# Patient Record
Sex: FEMALE | Race: BLACK OR AFRICAN AMERICAN | NOT HISPANIC OR LATINO | Employment: UNEMPLOYED | URBAN - METROPOLITAN AREA
[De-identification: names, ages, dates, MRNs, and addresses within clinical notes are randomized per-mention and may not be internally consistent; named-entity substitution may affect disease eponyms.]

---

## 2017-01-19 RX ORDER — LOSARTAN POTASSIUM 50 MG/1
TABLET ORAL
Qty: 30 TABLET | Refills: 9 | Status: SHIPPED | OUTPATIENT
Start: 2017-01-19 | End: 2017-01-19 | Stop reason: SDUPTHER

## 2017-01-19 RX ORDER — LOSARTAN POTASSIUM 50 MG/1
TABLET ORAL
Qty: 90 TABLET | Refills: 9 | Status: SHIPPED | OUTPATIENT
Start: 2017-01-19 | End: 2018-06-12 | Stop reason: SDUPTHER

## 2017-02-19 DIAGNOSIS — K21.9 GASTROESOPHAGEAL REFLUX DISEASE, ESOPHAGITIS PRESENCE NOT SPECIFIED: ICD-10-CM

## 2017-02-20 RX ORDER — PANTOPRAZOLE SODIUM 40 MG/1
TABLET, DELAYED RELEASE ORAL
Qty: 90 TABLET | Refills: 0 | OUTPATIENT
Start: 2017-02-20

## 2017-03-27 RX ORDER — PANTOPRAZOLE SODIUM 40 MG/1
40 TABLET, DELAYED RELEASE ORAL DAILY
Qty: 90 TABLET | Refills: 2 | Status: SHIPPED | OUTPATIENT
Start: 2017-03-27 | End: 2018-08-28 | Stop reason: SDUPTHER

## 2017-03-30 ENCOUNTER — PATIENT MESSAGE (OUTPATIENT)
Dept: FAMILY MEDICINE | Facility: CLINIC | Age: 42
End: 2017-03-30

## 2017-05-25 ENCOUNTER — TELEPHONE (OUTPATIENT)
Dept: FAMILY MEDICINE | Facility: CLINIC | Age: 42
End: 2017-05-25

## 2017-05-25 RX ORDER — ATORVASTATIN CALCIUM 40 MG/1
40 TABLET, FILM COATED ORAL DAILY
Qty: 90 TABLET | Refills: 1 | Status: SHIPPED | OUTPATIENT
Start: 2017-05-25 | End: 2018-06-12 | Stop reason: SDUPTHER

## 2017-05-25 NOTE — TELEPHONE ENCOUNTER
Fax rec'd from [facility].  Plan does not cover Brand Crestor 40 mg.  Alternatives include genic.  New Rx needed.

## 2017-06-22 ENCOUNTER — OFFICE VISIT (OUTPATIENT)
Dept: FAMILY MEDICINE | Facility: CLINIC | Age: 42
End: 2017-06-22
Payer: MEDICAID

## 2017-06-22 VITALS
HEART RATE: 84 BPM | HEIGHT: 67 IN | WEIGHT: 272.06 LBS | TEMPERATURE: 99 F | DIASTOLIC BLOOD PRESSURE: 84 MMHG | SYSTOLIC BLOOD PRESSURE: 127 MMHG | BODY MASS INDEX: 42.7 KG/M2

## 2017-06-22 DIAGNOSIS — I10 HTN (HYPERTENSION), BENIGN: Primary | ICD-10-CM

## 2017-06-22 PROCEDURE — 99499 UNLISTED E&M SERVICE: CPT | Mod: S$PBB,,, | Performed by: FAMILY MEDICINE

## 2017-06-22 PROCEDURE — 99213 OFFICE O/P EST LOW 20 MIN: CPT | Mod: PBBFAC,PO | Performed by: FAMILY MEDICINE

## 2017-06-22 PROCEDURE — 99999 PR PBB SHADOW E&M-EST. PATIENT-LVL III: CPT | Mod: PBBFAC,,, | Performed by: FAMILY MEDICINE

## 2017-06-22 NOTE — PROGRESS NOTES
Subjective:      Patient ID: Rama Moy is a 41 y.o. female.    Chief Complaint: Annual Exam    Office visit cancelled because she scheduled annual exam too early          No results found for: MICROALBUR                        Objective:   Physical Exam  Assessment:   No diagnosis found.  Plan:          There are no Patient Instructions on file for this visit.

## 2017-09-11 ENCOUNTER — TELEPHONE (OUTPATIENT)
Dept: FAMILY MEDICINE | Facility: CLINIC | Age: 42
End: 2017-09-11

## 2017-09-11 NOTE — TELEPHONE ENCOUNTER
----- Message from Jessica Solis sent at 9/11/2017 11:23 AM CDT -----  Contact: Patient [phone]  Requesting a Referral    Requesting to see: Grief Counselor    Reason for appt: Loss mother on Saturday    Please call and advise when referral has been placed.    Thank You

## 2017-09-11 NOTE — TELEPHONE ENCOUNTER
Can offer appt with me on Wed or Thursday if she'd like to discuss this & consider medications    I have a lot of therapist recommendations she can call also: These appt my take a few weeks & she can see me in the interim.     THERAPIST RECOMMENDATIONS:    Sherif Fuentes , [address] [facility], [facility] [phone] ( HOLISTIC, good with teenagers)  Jessica Trivedisonyacq - [phone],  [address], [facility]  Aparna Doyle, PhD - family & play therapist, [phone]  Quinn Billy, Full time at [employer], marriage & family counsellor  Dm Lopez - couples therapist  Collette Solorzano - [facility]  Caesar Krishnamurthy on Met Kobi Ling on Prytania  Meli Clouatre  [address]  [facility] - Jane Dunlap, Joan Villalta,  Inocencia Moore, Jayshree Wiley [phone]  [facility] - [phone]  Rachel Son (ayurvedic) - [phone] (focus on families, trauma)  Mariangel Pamela [address] [facility], [phone]  Luc Donnelly [phone] (focus on addiction)  [facility] - Brian Rober [phone](focus on addiction)  Ana Lilia Barrera [phone]  Mary Figueroa [phone]  Sherif Fuentes , [address] [facility], [facility] [phone] ( HOLISTIC, good with teenagers)  [facility], [phone]  Pretty Alexander (sexual relationship therapist), [address] [facility] [address], [phone]  Dannielle Weir  [employer], [phone], [address] [facility], [address], Dr Iliana Pool, Social workers, Art therapy,  bodywork therapy, Conscious breathing , Alternative healing, Massage therapists, [email or web address]

## 2017-09-12 NOTE — TELEPHONE ENCOUNTER
----- Message from Jessica Solis sent at 9/12/2017  8:55 AM CDT -----  Contact: Patient [phone]  Returned Call    Who left the message: Iliana Elizabeth LPN    When did the practice call: 09/11/2017 3:22pm    Please advise.    Thank You

## 2017-09-19 ENCOUNTER — OFFICE VISIT (OUTPATIENT)
Dept: FAMILY MEDICINE | Facility: CLINIC | Age: 42
End: 2017-09-19
Payer: MEDICAID

## 2017-09-19 VITALS
HEART RATE: 96 BPM | DIASTOLIC BLOOD PRESSURE: 82 MMHG | TEMPERATURE: 98 F | SYSTOLIC BLOOD PRESSURE: 120 MMHG | HEIGHT: 67 IN | BODY MASS INDEX: 41.94 KG/M2 | WEIGHT: 267.19 LBS

## 2017-09-19 DIAGNOSIS — F43.21 GRIEF: Primary | ICD-10-CM

## 2017-09-19 DIAGNOSIS — I10 HTN (HYPERTENSION), BENIGN: ICD-10-CM

## 2017-09-19 PROCEDURE — 99999 PR PBB SHADOW E&M-EST. PATIENT-LVL III: CPT | Mod: PBBFAC,,, | Performed by: FAMILY MEDICINE

## 2017-09-19 PROCEDURE — 3008F BODY MASS INDEX DOCD: CPT | Mod: ,,, | Performed by: FAMILY MEDICINE

## 2017-09-19 PROCEDURE — 3079F DIAST BP 80-89 MM HG: CPT | Mod: ,,, | Performed by: FAMILY MEDICINE

## 2017-09-19 PROCEDURE — 3074F SYST BP LT 130 MM HG: CPT | Mod: ,,, | Performed by: FAMILY MEDICINE

## 2017-09-19 PROCEDURE — 99213 OFFICE O/P EST LOW 20 MIN: CPT | Mod: PBBFAC,PO | Performed by: FAMILY MEDICINE

## 2017-09-19 PROCEDURE — 99212 OFFICE O/P EST SF 10 MIN: CPT | Mod: S$PBB,,, | Performed by: FAMILY MEDICINE

## 2017-09-19 RX ORDER — TRAZODONE HYDROCHLORIDE 50 MG/1
50 TABLET ORAL NIGHTLY
Qty: 30 TABLET | Refills: 11 | Status: SHIPPED | OUTPATIENT
Start: 2017-09-19 | End: 2021-07-06

## 2017-09-19 NOTE — PATIENT INSTRUCTIONS
PSYCHIATRIST RECOMMENDATIONS:    APPS:  [place]        THERAPIST RECOMMENDATIONS:    Sherif Fuentes , [address] [facility], [facility] [phone] ( HOLISTIC, good with teenagers)  Jessica Mcculloughashwin - [phone],  [address] [facility]  Aparna Doyle, PhD - family & play therapist, [phone]  Quinn Billy, Full time at [employer], marriage & family counsellor  Dm Lopez - couples therapist  Collette Solorzano - [facility]  Caesar Krishnamurthy on Met Kobi Ling on [place]  Meli Clouatre  [address]  [facility] - Jane Dunlap, Joan Villalta,  Inocencia Moore, Jayshree Wiley [phone]  Shima Gomez - [phone]  Rachel Son (ayurvedic) - [phone] (focus on families, trauma)  Mariangel Santiago [address] [facility], [phone]  Luc Donnelly [phone] (focus on addiction)  [facility] - Brian Rober [phone](focus on addiction)  Ana Lilia Barrera [phone]  Mary Figueroa [phone]  Sherif Fuentes , [address] [facility], [facility] [phone] ( HOLISTIC, good with teenagers)  [facility], [phone]  Pretty Alexander (sexual relationship therapist), [address] [facility] [address], [phone]  Dannielle Weir  [employer], [phone], [address] [facility], [address], Dr Iliana Pool, Social workers, Art therapy,  bodywork therapy, Conscious breathing , Alternative healing, Massage therapists, [email or web address]

## 2017-10-13 ENCOUNTER — HOSPITAL ENCOUNTER (EMERGENCY)
Facility: OTHER | Age: 42
Discharge: HOME | End: 2017-10-13
Attending: EMERGENCY MEDICINE
Payer: MEDICAID

## 2017-10-13 VITALS
WEIGHT: 267.19 LBS | HEART RATE: 88 BPM | OXYGEN SATURATION: 99 % | RESPIRATION RATE: 16 BRPM | TEMPERATURE: 99 F | DIASTOLIC BLOOD PRESSURE: 82 MMHG | SYSTOLIC BLOOD PRESSURE: 156 MMHG | HEIGHT: 67 IN | BODY MASS INDEX: 41.94 KG/M2

## 2017-10-13 DIAGNOSIS — R07.9 CHEST PAIN: ICD-10-CM

## 2017-10-13 DIAGNOSIS — R07.9 CHEST PAIN, UNSPECIFIED TYPE: Primary | ICD-10-CM

## 2017-10-13 DIAGNOSIS — F41.1 ANXIETY REACTION: ICD-10-CM

## 2017-10-13 LAB
B-HCG UR QL: NEGATIVE
CTP QC/QA: YES

## 2017-10-13 PROCEDURE — 99284 EMERGENCY DEPT VISIT MOD MDM: CPT | Mod: 25

## 2017-10-13 PROCEDURE — 81025 URINE PREGNANCY TEST: CPT | Performed by: EMERGENCY MEDICINE

## 2017-10-13 PROCEDURE — 93005 ELECTROCARDIOGRAM TRACING: CPT

## 2017-10-13 PROCEDURE — 25000003 PHARM REV CODE 250: Performed by: PHYSICIAN ASSISTANT

## 2017-10-13 PROCEDURE — 93010 ELECTROCARDIOGRAM REPORT: CPT | Mod: ,,, | Performed by: INTERNAL MEDICINE

## 2017-10-13 RX ORDER — IBUPROFEN 600 MG/1
600 TABLET ORAL EVERY 6 HOURS PRN
Qty: 20 TABLET | Refills: 0 | Status: SHIPPED | OUTPATIENT
Start: 2017-10-13 | End: 2018-03-27 | Stop reason: ALTCHOICE

## 2017-10-13 RX ORDER — HYDROXYZINE HYDROCHLORIDE 25 MG/1
25 TABLET, FILM COATED ORAL EVERY 6 HOURS
Qty: 12 TABLET | Refills: 0 | OUTPATIENT
Start: 2017-10-13 | End: 2020-01-04

## 2017-10-13 RX ORDER — IBUPROFEN 600 MG/1
600 TABLET ORAL
Status: COMPLETED | OUTPATIENT
Start: 2017-10-13 | End: 2017-10-13

## 2017-10-13 RX ORDER — HYDROXYZINE PAMOATE 25 MG/1
25 CAPSULE ORAL
Status: COMPLETED | OUTPATIENT
Start: 2017-10-13 | End: 2017-10-13

## 2017-10-13 RX ADMIN — HYDROXYZINE PAMOATE 25 MG: 25 CAPSULE ORAL at 08:10

## 2017-10-13 RX ADMIN — IBUPROFEN 600 MG: 600 TABLET, FILM COATED ORAL at 08:10

## 2017-10-14 NOTE — ED TRIAGE NOTES
Pt presents to ED with c/o sharp, midsternal CP, reports CP daily since her mother dies in September. Pt tearful, reports she has been going to grief counseling. Pt denies radiation of chest pain, denies n/v, reports pt worse with inspiration. Pt AAO x4.

## 2017-10-14 NOTE — ED PROVIDER NOTES
Encounter Date: 10/13/2017       History     Chief Complaint   Patient presents with    Chest Pain     x 2 weeks, mom  last month, greiving process, L chest, reproducable, increased upon deep inspiration, sharp in nature, non- radiating, no N/V/D     Patient is 41 year old female history of HTN and thyroid disease who presents with complaints of chest pain that has been present on and off for a few weeks PTA. She reports pain is constant and waxes and wanes in intensity.  She reports it's located in the midsternal region and is worse with palpation.  It's described as a sharp sticking sensation and there is no associated shortness of breath nausea vomiting or radiation of pain.  She has not taken any medications help with her symptoms.  She admits that these symptoms have been present since her mother's death one month ago and that she is currently increased counseling every Thursday and has been in close contact with her primary care provider throughout this process.  She admits that persistent of this chest pain became worrisome for her today which is why she presents to the ER for evaluation.  She is currently unaccompanied in the ER.          Review of patient's allergies indicates:  No Known Allergies  Past Medical History:   Diagnosis Date    Constipation     GI Dr Reece     Grief 2017    HTN (hypertension), benign 3/10/2015    Hyperlipidemia     Hypertension     Knee pain, left     Dr Reyes, injury at work    Thyroid disease      Past Surgical History:   Procedure Laterality Date    DILATION AND CURETTAGE OF UTERUS      KNEE ARTHROSCOPY      Eric MOHAMUD    KNEE SURGERY      SKIN GRAFT      left finger due to injury    TUBAL LIGATION       Family History   Problem Relation Age of Onset    Hypertension Mother     Diabetes Mother     COPD Mother     Breast cancer Neg Hx     Colon cancer Neg Hx     Ovarian cancer Neg Hx      Social History   Substance Use Topics    Smoking status:  Never Smoker    Smokeless tobacco: Never Used    Alcohol use No     Review of Systems   Constitutional: Negative for fever.   HENT: Negative for sore throat.    Respiratory: Negative for shortness of breath.    Cardiovascular: Positive for chest pain.   Gastrointestinal: Negative for nausea.   Genitourinary: Negative for dysuria.   Musculoskeletal: Negative for back pain.   Skin: Negative for rash.   Neurological: Negative for weakness.   Hematological: Does not bruise/bleed easily.       Physical Exam     Initial Vitals [10/13/17 1950]   BP Pulse Resp Temp SpO2   (!) 183/90 97 20 98.6 °F (37 °C) 98 %      MAP       121         Physical Exam    Nursing note and vitals reviewed.  Constitutional: She appears well-developed and well-nourished. She is not diaphoretic. No distress.   Healthy appearing female in no acute distress or apparent pain.  She is tearful during interview and exam.  She makes good eye contact, speaks in clear full sentences and ambulates with ease.   HENT:   Head: Normocephalic and atraumatic.   Eyes: Conjunctivae are normal. Pupils are equal, round, and reactive to light. Right eye exhibits no discharge. Left eye exhibits no discharge. No scleral icterus.   Neck: Normal range of motion.   Cardiovascular: Normal rate, regular rhythm, normal heart sounds and intact distal pulses. Exam reveals no gallop and no friction rub.    No murmur heard.  Normal cardiac auscultation   Pulmonary/Chest: Breath sounds normal. She has no wheezes. She has no rhonchi. She has no rales.   midsternal chest tenderness to palpation with no overlying skin changes.  Clear lungs to auscultation bilaterally.   Abdominal: Soft. Bowel sounds are normal. There is no tenderness. There is no rebound and no guarding.   Musculoskeletal: Normal range of motion. She exhibits no edema or tenderness.   Lymphadenopathy:     She has no cervical adenopathy.   Neurological: She is alert and oriented to person, place, and time. She has  normal strength. No cranial nerve deficit or sensory deficit.   Skin: Skin is warm. Capillary refill takes less than 2 seconds. No rash noted. No erythema.   Psychiatric: She has a normal mood and affect. Her behavior is normal. Thought content normal.         ED Course   Procedures  Labs Reviewed   POCT URINE PREGNANCY - Normal     EKG Readings: (Independently Interpreted)   Initial Reading: No STEMI. Previous EKG: Compared with most recent EKG Rhythm: Normal Sinus Rhythm. Heart Rate: 98. Ectopy: No Ectopy. ST Segments: Normal ST Segments. T Waves: Normal. Axis: Normal.     Imaging Results          X-Ray Chest PA And Lateral (Final result)  Result time 10/13/17 20:55:23    Final result by Yohan Estrella MD (10/13/17 20:55:23)                 Impression:        No radiographic acute intrathoracic process seen.      Electronically signed by: YOHAN ESTRELLA MD, MD  Date:     10/13/17  Time:    20:55              Narrative:    COMPARISON: Chest radiograph 8/18/15    FINDINGS: Two views of the chest. No detrimental change.   Pulmonary vasculature and hilar regions are within normal limits.  The bilateral lungs are well expanded and clear.  No pleural effusion or pneumothorax.  The heart and mediastinal contours are within normal limits for age.  Included osseous structures appear intact.                                 Medical Decision Making:   ED Management:  Urgent evaluation a 41-year-old female who presents with complaints of midsternal chest pain related to anxiety reaction from recent death of her mother.  She is afebrile, nontoxic appearing, hemodynamically stable.  Physical exam outlined above and reveals reproducible midsternal chest pain with no other abnormalities.  EKG reveals NSR without concern for acute ischemia.  Chest x-ray unremarkable.  Pt PERC negative. Patient given ibuprofen and Atarax here in the emergency department for symptom improvement.  I have considered but do not suspect pulmonary embolus,  acute coronary syndrome, pulmonary infectious process. She is reassured that she is felt safe for discharge with strict instruction to follow-up with primary provider one to 2 days for symptom recheck.  Other:   I have discussed this case with another health care provider.       <> Summary of the Discussion: Stas                    ED Course      Clinical Impression:   The primary encounter diagnosis was Chest pain, unspecified type. Diagnoses of Chest pain and Anxiety reaction were also pertinent to this visit.                           Sharmin Pool PA-C  10/13/17 4601

## 2017-11-17 RX ORDER — UREA 40 %
CREAM (GRAM) TOPICAL
Qty: 85 G | Refills: 0 | Status: SHIPPED | OUTPATIENT
Start: 2017-11-17 | End: 2019-01-30

## 2018-03-27 ENCOUNTER — HOSPITAL ENCOUNTER (EMERGENCY)
Facility: OTHER | Age: 43
Discharge: HOME | End: 2018-03-28
Attending: EMERGENCY MEDICINE
Payer: MEDICAID

## 2018-03-27 VITALS
WEIGHT: 220 LBS | BODY MASS INDEX: 34.53 KG/M2 | OXYGEN SATURATION: 98 % | TEMPERATURE: 98 F | DIASTOLIC BLOOD PRESSURE: 95 MMHG | HEART RATE: 99 BPM | SYSTOLIC BLOOD PRESSURE: 160 MMHG | HEIGHT: 67 IN | RESPIRATION RATE: 18 BRPM

## 2018-03-27 DIAGNOSIS — M25.531 RIGHT WRIST PAIN: Primary | ICD-10-CM

## 2018-03-27 DIAGNOSIS — R52 PAIN: ICD-10-CM

## 2018-03-27 LAB
B-HCG UR QL: NEGATIVE
CTP QC/QA: YES

## 2018-03-27 PROCEDURE — 99284 EMERGENCY DEPT VISIT MOD MDM: CPT | Mod: 25

## 2018-03-27 PROCEDURE — 96372 THER/PROPH/DIAG INJ SC/IM: CPT

## 2018-03-27 PROCEDURE — 81025 URINE PREGNANCY TEST: CPT | Performed by: EMERGENCY MEDICINE

## 2018-03-27 PROCEDURE — 63600175 PHARM REV CODE 636 W HCPCS: Performed by: PHYSICIAN ASSISTANT

## 2018-03-27 RX ORDER — OXAPROZIN 600 MG/1
600 TABLET, FILM COATED ORAL 2 TIMES DAILY PRN
Qty: 20 TABLET | Refills: 0 | Status: SHIPPED | OUTPATIENT
Start: 2018-03-27 | End: 2018-08-28

## 2018-03-27 RX ORDER — KETOROLAC TROMETHAMINE 30 MG/ML
30 INJECTION, SOLUTION INTRAMUSCULAR; INTRAVENOUS
Status: COMPLETED | OUTPATIENT
Start: 2018-03-27 | End: 2018-03-27

## 2018-03-27 RX ADMIN — KETOROLAC TROMETHAMINE 30 MG: 30 INJECTION, SOLUTION INTRAMUSCULAR; INTRAVENOUS at 10:03

## 2018-03-28 NOTE — ED PROVIDER NOTES
Encounter Date: 3/27/2018       History     Chief Complaint   Patient presents with    Wrist Pain     Pt CO non-traumatic right wrist pain since this morning.      42-year-old female with history of hypertension, hyperlipidemia, thyroid disease and left knee pain status post knee surgery presents emergency department with complaints of right wrist pain.  States it started this morning when she woke up.  She denies trauma, injury, fever, chills, redness, warmth, numbness or weakness.  She complains of pain is an 8 out of 10.  No current treatment.  She is right-hand dominant      The history is provided by the patient.     Review of patient's allergies indicates:  No Known Allergies  Past Medical History:   Diagnosis Date    Constipation     GI Dr Reece 2014    Grief 9/19/2017    HTN (hypertension), benign 3/10/2015    Hyperlipidemia     Hypertension     Knee pain, left 2012    Dr Reyes, injury at work    Thyroid disease      Past Surgical History:   Procedure Laterality Date    DILATION AND CURETTAGE OF UTERUS      KNEE ARTHROSCOPY      LEric    KNEE SURGERY      SKIN GRAFT      left finger due to injury    TUBAL LIGATION       Family History   Problem Relation Age of Onset    Hypertension Mother     Diabetes Mother     COPD Mother     Breast cancer Neg Hx     Colon cancer Neg Hx     Ovarian cancer Neg Hx      Social History   Substance Use Topics    Smoking status: Never Smoker    Smokeless tobacco: Never Used    Alcohol use No     Review of Systems   Constitutional: Negative for chills and fever.   HENT: Negative for sore throat.    Respiratory: Negative for shortness of breath.    Cardiovascular: Negative for chest pain.   Gastrointestinal: Negative for nausea and vomiting.   Genitourinary: Negative for dysuria.   Musculoskeletal: Positive for arthralgias and myalgias. Negative for back pain, gait problem, joint swelling, neck pain and neck stiffness.   Skin: Negative for rash.    Neurological: Negative for weakness and numbness.   Hematological: Does not bruise/bleed easily.       Physical Exam     Initial Vitals [03/27/18 2004]   BP Pulse Resp Temp SpO2   (!) 160/95 99 18 98.3 °F (36.8 °C) 98 %      MAP       116.67         Physical Exam    Nursing note and vitals reviewed.  Constitutional: She appears well-developed and well-nourished. She is not diaphoretic. She is Obese .  Non-toxic appearance. No distress.   HENT:   Head: Normocephalic and atraumatic.   Right Ear: External ear normal.   Left Ear: External ear normal.   Nose: Nose normal.   Eyes: Conjunctivae and lids are normal. No scleral icterus.   Neck: Phonation normal.   Cardiovascular: Normal rate, regular rhythm, normal heart sounds, intact distal pulses and normal pulses. Exam reveals no gallop, no friction rub and no decreased pulses.    No murmur heard.  Pulses:       Radial pulses are 2+ on the right side, and 2+ on the left side.   Abdominal: Normal appearance. There is no rebound.   Musculoskeletal: Normal range of motion.        Right wrist: She exhibits tenderness. She exhibits no swelling, no effusion, no crepitus, no deformity and no laceration.   No obvious deformities, moving all extremities, normal gait  Right wrist-diffuse nonspecific tenderness palpation to the entire wrist.  No edema, ecchymosis or erythema.  No warmth.  No lymphangitis.  Pain with range of motion.  Strength 5 out of 5.  Intact distal pulses and no sensory deficits.  Capillary refill less than 3 seconds   Neurological: She is alert and oriented to person, place, and time. She has normal strength. No sensory deficit.   Skin: Skin is warm, dry and intact. Capillary refill takes less than 2 seconds. No abrasion, no bruising, no ecchymosis, no laceration, no lesion, no rash and no abscess noted. No erythema.   Psychiatric: She has a normal mood and affect. Her speech is normal and behavior is normal. Judgment normal. Cognition and memory are normal.          ED Course   Procedures  Labs Reviewed   POCT URINE PREGNANCY        Imaging Results          X-Ray Wrist Complete Right (Final result)  Result time 03/27/18 22:11:41    Final result by Bjorn Aragon MD (03/27/18 22:11:41)                 Impression:      No acute fracture.    Mild DJD.      Electronically signed by: Bjorn Aragon MD  Date:    03/27/2018  Time:    22:11             Narrative:    EXAMINATION:  XR WRIST COMPLETE 3 VIEWS RIGHT    CLINICAL HISTORY:  Pain, unspecified    TECHNIQUE:  PA, lateral, and oblique views of the right wrist were performed.    COMPARISON:  None    FINDINGS:  No fracture or dislocation.  Mild degenerative change at the greater multangular joint.  Soft tissues are unremarkable.                                X-Rays:   Independently Interpreted Readings:   Other Readings:  Right wrist-no acute fracture dislocation    Medical Decision Making:   History:   Old Medical Records: I decided to obtain old medical records.  Initial Assessment:   42-year-old female with complaints consistent with right wrist pain.  Afebrile neurovascular intact.  She is alert and healthy and nontoxic appearing.  Exam documented above.  No bony deformity, erythema or warmth.  No evidence of septic joint area no cellulitis or lymphangitis.  She denies trauma or injury.  Independently Interpreted Test(s):   I have ordered and independently interpreted X-rays - see prior notes.  Clinical Tests:   Radiological Study: Ordered and Reviewed  ED Management:  X-ray obtained and independently interpreted by myself with no evidence of fracture dislocation.  She was placed in a wrist splint and administered Toradol.  She is stable will be discharged home with oxaprozin and care instructions.  She is to follow-up with her doctor or orthopedics in the next 48 hours or return for any worsening signs or symptoms.  She states understanding.  This patient was discussed with the attending physician who agrees with  treatment plan.  Other:   I have discussed this case with another health care provider.       <> Summary of the Discussion: Jerilyn  This note was created using Dragon Medical dictation.  There may be typographical errors secondary to dictation.                        Clinical Impression:     1. Right wrist pain    2. Pain          Disposition:   Disposition: Discharged  Condition: Stable                        Ciara Kevin PA-C  03/27/18 8193

## 2018-03-28 NOTE — ED NOTES
Patient presents to the ED with a complaint of right wrist pain. Patient states that she can't turn or rotate wrist area. Patient able to slightly wiggle fingers, cap refill noted, radial pulse noted. Patient denies any trauma or pain to the area.

## 2018-03-29 ENCOUNTER — TELEPHONE (OUTPATIENT)
Dept: FAMILY MEDICINE | Facility: CLINIC | Age: 43
End: 2018-03-29

## 2018-03-29 ENCOUNTER — PES CALL (OUTPATIENT)
Dept: ADMINISTRATIVE | Facility: CLINIC | Age: 43
End: 2018-03-29

## 2018-03-29 DIAGNOSIS — M25.531 WRIST PAIN, RIGHT: Primary | ICD-10-CM

## 2018-03-29 NOTE — TELEPHONE ENCOUNTER
----- Message from Mary Posey sent at 3/29/2018 12:02 PM CDT -----  Contact: self/[phone]  Pt would like to get an referral from the doctor for a Orthopedic that Medicaid will cover. I explained to the pt she can call Medicaid to ask for a copy of Specialist they'll cover. DF

## 2018-03-29 NOTE — TELEPHONE ENCOUNTER
Ortho referral, demographics and 3/27/18 ER notes faxed to [facility].  Fax confirm rec'd.

## 2018-03-29 NOTE — TELEPHONE ENCOUNTER
Pt was seen at ER 3/27/18.for right wrist pain.  Pt advised to follow up with orthopedist.  Referral pended:     [facility]  [address]  [place]  [address]  [phone]

## 2018-04-02 ENCOUNTER — TELEPHONE (OUTPATIENT)
Dept: FAMILY MEDICINE | Facility: CLINIC | Age: 43
End: 2018-04-02

## 2018-04-02 NOTE — TELEPHONE ENCOUNTER
----- Message from Imelda Baca sent at 4/2/2018 11:37 AM CDT -----  Pt needs an external referral to [facility] -Pt has medicaid and [facility] cannot see her until August.

## 2018-04-03 NOTE — TELEPHONE ENCOUNTER
Nothing else needed.  Referral, demographics and 3/27/18 clinic notes were faxed to [facility] on 3/29.  Fax confirm was rec'd.

## 2018-06-12 DIAGNOSIS — Z00.00 ROUTINE GENERAL MEDICAL EXAMINATION AT A HEALTH CARE FACILITY: Primary | ICD-10-CM

## 2018-06-12 NOTE — TELEPHONE ENCOUNTER
----- Message from Jessica Solis sent at 6/12/2018  1:14 PM CDT -----  Contact: Patient [phone]  Requesting an earlier appt date or time    Next available appt: None    Nature of the appt: Annual Physical    Does patient have appt scheduled?: No    Please contact patient to schedule earlier appt if available but notify patient either way.    Thank You

## 2018-06-13 RX ORDER — LOSARTAN POTASSIUM 50 MG/1
50 TABLET ORAL DAILY
Qty: 90 TABLET | Refills: 0 | Status: SHIPPED | OUTPATIENT
Start: 2018-06-13 | End: 2018-09-26 | Stop reason: SDUPTHER

## 2018-06-13 RX ORDER — ATORVASTATIN CALCIUM 40 MG/1
40 TABLET, FILM COATED ORAL DAILY
Qty: 90 TABLET | Refills: 0 | Status: SHIPPED | OUTPATIENT
Start: 2018-06-13 | End: 2018-11-18 | Stop reason: SDUPTHER

## 2018-07-20 ENCOUNTER — TELEPHONE (OUTPATIENT)
Dept: FAMILY MEDICINE | Facility: CLINIC | Age: 43
End: 2018-07-20

## 2018-07-20 DIAGNOSIS — Z12.39 SCREENING BREAST EXAMINATION: Primary | ICD-10-CM

## 2018-07-20 NOTE — TELEPHONE ENCOUNTER
----- Message from Shira Pool sent at 7/19/2018  6:20 PM CDT -----  Contact: PT Portal Request  Appointment Request From: Rama Moy    With Provider: Other - (see comments)    Would Accept With:Only the person I've selected    Preferred Date Range: Any date 7/18/2018 or later    Preferred Times: Any    Reason for visit: Request an Appt    Comments:   annual mammogram exam, need to schedule an appointment    PT is needing a mammogram order placed please

## 2018-07-23 ENCOUNTER — HOSPITAL ENCOUNTER (OUTPATIENT)
Dept: RADIOLOGY | Facility: OTHER | Age: 43
Discharge: HOME | End: 2018-07-23
Attending: FAMILY MEDICINE
Payer: MEDICAID

## 2018-07-23 DIAGNOSIS — Z12.39 SCREENING BREAST EXAMINATION: ICD-10-CM

## 2018-07-23 PROCEDURE — 77067 SCR MAMMO BI INCL CAD: CPT | Mod: 26,,, | Performed by: RADIOLOGY

## 2018-07-23 PROCEDURE — 77067 SCR MAMMO BI INCL CAD: CPT | Mod: TC

## 2018-07-23 PROCEDURE — 77063 BREAST TOMOSYNTHESIS BI: CPT | Mod: 26,,, | Performed by: RADIOLOGY

## 2018-08-20 ENCOUNTER — LAB VISIT (OUTPATIENT)
Dept: LAB | Facility: HOSPITAL | Age: 43
End: 2018-08-20
Attending: FAMILY MEDICINE
Payer: MEDICAID

## 2018-08-20 DIAGNOSIS — Z00.00 ROUTINE GENERAL MEDICAL EXAMINATION AT A HEALTH CARE FACILITY: ICD-10-CM

## 2018-08-20 LAB
ALBUMIN SERPL BCP-MCNC: 3.2 G/DL
ALP SERPL-CCNC: 79 U/L
ALT SERPL W/O P-5'-P-CCNC: 10 U/L
ANION GAP SERPL CALC-SCNC: 11 MMOL/L
AST SERPL-CCNC: 14 U/L
BASOPHILS # BLD AUTO: 0.05 K/UL
BASOPHILS NFR BLD: 0.7 %
BILIRUB SERPL-MCNC: 0.4 MG/DL
BUN SERPL-MCNC: 15 MG/DL
CALCIUM SERPL-MCNC: 8.9 MG/DL
CHLORIDE SERPL-SCNC: 106 MMOL/L
CHOLEST SERPL-MCNC: 294 MG/DL
CHOLEST/HDLC SERPL: 7.4 {RATIO}
CO2 SERPL-SCNC: 23 MMOL/L
CREAT SERPL-MCNC: 0.9 MG/DL
DIFFERENTIAL METHOD: ABNORMAL
EOSINOPHIL # BLD AUTO: 0.1 K/UL
EOSINOPHIL NFR BLD: 1.6 %
ERYTHROCYTE [DISTWIDTH] IN BLOOD BY AUTOMATED COUNT: 13.4 %
EST. GFR  (AFRICAN AMERICAN): >60 ML/MIN/1.73 M^2
EST. GFR  (NON AFRICAN AMERICAN): >60 ML/MIN/1.73 M^2
GLUCOSE SERPL-MCNC: 92 MG/DL
HCT VFR BLD AUTO: 37.2 %
HDLC SERPL-MCNC: 40 MG/DL
HDLC SERPL: 13.6 %
HGB BLD-MCNC: 11.8 G/DL
IMM GRANULOCYTES # BLD AUTO: 0.02 K/UL
IMM GRANULOCYTES NFR BLD AUTO: 0.3 %
LDLC SERPL CALC-MCNC: 234.4 MG/DL
LYMPHOCYTES # BLD AUTO: 2.8 K/UL
LYMPHOCYTES NFR BLD: 36.6 %
MCH RBC QN AUTO: 28.4 PG
MCHC RBC AUTO-ENTMCNC: 31.7 G/DL
MCV RBC AUTO: 90 FL
MONOCYTES # BLD AUTO: 0.5 K/UL
MONOCYTES NFR BLD: 7.1 %
NEUTROPHILS # BLD AUTO: 4 K/UL
NEUTROPHILS NFR BLD: 53.7 %
NONHDLC SERPL-MCNC: 254 MG/DL
NRBC BLD-RTO: 0 /100 WBC
PLATELET # BLD AUTO: 394 K/UL
PMV BLD AUTO: 11 FL
POTASSIUM SERPL-SCNC: 3.8 MMOL/L
PROT SERPL-MCNC: 6.9 G/DL
RBC # BLD AUTO: 4.15 M/UL
SODIUM SERPL-SCNC: 140 MMOL/L
TRIGL SERPL-MCNC: 98 MG/DL
WBC # BLD AUTO: 7.51 K/UL

## 2018-08-20 PROCEDURE — 85025 COMPLETE CBC W/AUTO DIFF WBC: CPT

## 2018-08-20 PROCEDURE — 36415 COLL VENOUS BLD VENIPUNCTURE: CPT | Mod: PO

## 2018-08-20 PROCEDURE — 80053 COMPREHEN METABOLIC PANEL: CPT

## 2018-08-20 PROCEDURE — 80061 LIPID PANEL: CPT

## 2018-08-28 ENCOUNTER — OFFICE VISIT (OUTPATIENT)
Dept: FAMILY MEDICINE | Facility: CLINIC | Age: 43
End: 2018-08-28
Payer: MEDICAID

## 2018-08-28 VITALS
HEIGHT: 67 IN | RESPIRATION RATE: 20 BRPM | SYSTOLIC BLOOD PRESSURE: 113 MMHG | DIASTOLIC BLOOD PRESSURE: 78 MMHG | TEMPERATURE: 98 F | WEIGHT: 259.25 LBS | BODY MASS INDEX: 40.69 KG/M2

## 2018-08-28 DIAGNOSIS — M17.11 PRIMARY LOCALIZED OSTEOARTHROSIS OF RIGHT LOWER LEG: ICD-10-CM

## 2018-08-28 DIAGNOSIS — E78.00 PURE HYPERCHOLESTEROLEMIA: ICD-10-CM

## 2018-08-28 DIAGNOSIS — Z96.89 SPINAL CORD STIMULATOR STATUS: ICD-10-CM

## 2018-08-28 DIAGNOSIS — I10 HTN (HYPERTENSION), BENIGN: ICD-10-CM

## 2018-08-28 DIAGNOSIS — K21.9 GASTROESOPHAGEAL REFLUX DISEASE WITHOUT ESOPHAGITIS: ICD-10-CM

## 2018-08-28 DIAGNOSIS — Z00.00 ROUTINE GENERAL MEDICAL EXAMINATION AT A HEALTH CARE FACILITY: Primary | ICD-10-CM

## 2018-08-28 PROCEDURE — 99396 PREV VISIT EST AGE 40-64: CPT | Mod: S$PBB,,, | Performed by: FAMILY MEDICINE

## 2018-08-28 PROCEDURE — 99213 OFFICE O/P EST LOW 20 MIN: CPT | Mod: PBBFAC,PO | Performed by: FAMILY MEDICINE

## 2018-08-28 PROCEDURE — 99999 PR PBB SHADOW E&M-EST. PATIENT-LVL III: CPT | Mod: PBBFAC,,, | Performed by: FAMILY MEDICINE

## 2018-08-28 RX ORDER — PANTOPRAZOLE SODIUM 40 MG/1
40 TABLET, DELAYED RELEASE ORAL DAILY
Qty: 90 TABLET | Refills: 0 | Status: SHIPPED | OUTPATIENT
Start: 2018-08-28 | End: 2019-01-02 | Stop reason: SDUPTHER

## 2018-08-28 NOTE — PATIENT INSTRUCTIONS
She will call Medicaid to see which GI doctor she can see. She will call my nurse with the doctor's name & phone number. Make the appt.     Make sure you take LIPITOR daily     Continue with grief counsellor    Follow with GYN DR Juarez for female health & cancer prevention    Follow with Dr Kessler    =========================================================    ==============================  RECOMMENDATIONS FOR FEMALES  ==============================  Your #1 MEDICINE is DAILY EXERCISE - 15-20 minutes of huffing & puffing EVERY DAY.     Prevent the #1 cause of death- cardiovascular disease (HEART ATTACK & STROKE) by checking for normal blood pressure, cholesterol, sugars, & by not smoking.     VACCINES: Yearly FLU shot, ONE PNEUMONIA shot after 65,  SHINGLES shot after 60    Screening colonoscopy at AGE  50 & every 10 years to check for COLON CANCER,  one of the most common & preventable cancers (Or FIT kit yearly) Repeat in 3 years if POLYP found     I recommend  high fiber (5 fresh fruits or vegetables daily), low fat diet and aerobic  exercise (huffing/ puffing/ sweating for 20 min straight at least 4 days a week)    Follow up yearly with mammogram, fasting lipids, CMP, CBC prior.   ==============================================================

## 2018-08-28 NOTE — PROGRESS NOTES
Subjective:      Patient ID: Rama Moy is a 42 y.o. female.    Chief Complaint: Annual Exam    Here today for general exam.     She has not been taking Lipitor 40 mg every day, denies side effects    She is still grieving the loss of her mom 1 year ago but states she has a wonderful grief counselor    She has gained weight, unable to exercise with spinal cord stimulator in her back by Dr. Kessler, to help with left knee nerve damage.  Also following with orthopedist    Denies any chest pain, shortness of breath, nausea vomiting constipation diarrhea, blood in stool,     She takes Protonix daily and frequently has heartburn.  She had EGD but many years ago.  She feels it is time for another one. Takes Naproxen but not daily.       The 10-year ASCVD risk score (Laureblanca WALSH Jr., et al., 2013) is: 3.1%    Values used to calculate the score:      Age: 42 years      Sex: Female      Is Non- : Yes      Diabetic: No      Tobacco smoker: No      Systolic Blood Pressure: 113 mmHg      Is BP treated: Yes      HDL Cholesterol: 40 mg/dL      Total Cholesterol: 294 mg/dL   No results found for: MICALBCREAT     Lab Results   Component Value Date    LDLCALC 234.4 (H) 08/20/2018    LDLCALC 190.0 (H) 10/17/2016    CHOL 294 (H) 08/20/2018    HDL 40 08/20/2018    TRIG 98 08/20/2018       Lab Results   Component Value Date     08/20/2018    K 3.8 08/20/2018     08/20/2018    CO2 23 08/20/2018    GLU 92 08/20/2018    BUN 15 08/20/2018    CREATININE 0.9 08/20/2018    CALCIUM 8.9 08/20/2018    PROT 6.9 08/20/2018    ALBUMIN 3.2 (L) 08/20/2018    BILITOT 0.4 08/20/2018    ALKPHOS 79 08/20/2018    AST 14 08/20/2018    ALT 10 08/20/2018    ANIONGAP 11 08/20/2018    ESTGFRAFRICA >60.0 08/20/2018    EGFRNONAA >60.0 08/20/2018    WBC 7.51 08/20/2018    HGB 11.8 (L) 08/20/2018    HCT 37.2 08/20/2018    MCV 90 08/20/2018     (H) 08/20/2018    TSH 2.343 10/17/2016         Current Outpatient Medications  "on File Prior to Visit   Medication Sig    atorvastatin (LIPITOR) 40 MG tablet Take 1 tablet (40 mg total) by mouth once daily.    hydrOXYzine HCl (ATARAX) 25 MG tablet Take 1 tablet (25 mg total) by mouth every 6 (six) hours.    losartan (COZAAR) 50 MG tablet Take 1 tablet (50 mg total) by mouth once daily.    pregabalin (LYRICA) 100 MG capsule Take 100 mg by mouth 2 (two) times daily.    trazodone (DESYREL) 50 MG tablet Take 1 tablet (50 mg total) by mouth every evening.    urea (CARMOL) 40 % Crea APPLY TO THE AFFECTED AREA TWICE DAILY         [ pantoprazole (PROTONIX) 40 MG tablet Take 1 tablet (40 mg total) by mouth once daily.     No current facility-administered medications on file prior to visit.      Past Medical History:   Diagnosis Date    Constipation     GI Dr Reece 2014    Gastroesophageal reflux disease without esophagitis 8/28/2018    She states she had EGD in past    Grief 9/19/2017    HTN (hypertension), benign 3/10/2015    Knee pain, left 2012    Dr Reyes, injury at work    Morbid obesity 2/23/2015    Pure hypercholesterolemia 8/29/2013    Spinal cord stimulator status 08/28/2018    In back for L knee pain, Dr Kessler surgery & pain mgmt, Lyrica or Hydrocodone at night    Thyroid disease      Past Surgical History:   Procedure Laterality Date    DILATION AND CURETTAGE OF UTERUS      KNEE ARTHROSCOPY      Eric MOHAMUD    KNEE SURGERY      SKIN GRAFT      left finger due to injury    SPINAL CORD STIMULATOR IMPLANT      to L knee, Dr Kessler    TUBAL LIGATION       Social History     Social History Narrative    Surveillance, , 1 child, nonsmoker, nondrinker, GYN White     Family History   Problem Relation Age of Onset    Hypertension Mother     Diabetes Mother     COPD Mother     Breast cancer Neg Hx     Colon cancer Neg Hx     Ovarian cancer Neg Hx      Vitals:    08/28/18 1401   BP: 113/78   Resp: 20   Temp: 98 °F (36.7 °C)   Weight: 117.6 kg (259 lb 4.2 oz)   Height: 5' 7" " (1.702 m)     Objective:   Physical Exam   Constitutional: She is oriented to person, place, and time. She appears well-developed and well-nourished.   HENT:   Head: Normocephalic and atraumatic.   Right Ear: External ear normal.   Left Ear: External ear normal.   Nose: Nose normal.   Mouth/Throat: Oropharynx is clear and moist.   Eyes: EOM are normal. Pupils are equal, round, and reactive to light.   Neck: Neck supple. No thyromegaly present.   Cardiovascular: Normal rate, regular rhythm, normal heart sounds and intact distal pulses.   No murmur heard.  Pulmonary/Chest: Effort normal and breath sounds normal. She has no wheezes.   Abdominal: Soft. Bowel sounds are normal. She exhibits no distension and no mass. There is no tenderness. There is no rebound and no guarding.   Musculoskeletal: She exhibits no edema.   Lymphadenopathy:     She has no cervical adenopathy.   Neurological: She is alert and oriented to person, place, and time.   Skin: Skin is warm and dry.   Psychiatric: She has a normal mood and affect. Her behavior is normal.     Assessment:     1. Routine general medical examination at a health care facility    2. HTN (hypertension), benign    3. Gastroesophageal reflux disease without esophagitis    4. Primary localized osteoarthrosis of right lower leg    5. Spinal cord stimulator status    6. Pure hypercholesterolemia      Plan:     Orders Placed This Encounter    pantoprazole (PROTONIX) 40 MG tablet     Due for EGD    Repeat lipids 2 months    Continue other medications    Patient Instructions   She will call Medicaid to see which GI doctor she can see. She will call my nurse with the doctor's name & phone number. Make the appt.     Make sure you take LIPITOR daily     Continue with grief counsellor    Follow with GYN DR Juarez for female health & cancer prevention    Follow with Dr Kessler    =========================================================    ==============================  RECOMMENDATIONS  FOR FEMALES  ==============================  Your #1 MEDICINE is DAILY EXERCISE - 15-20 minutes of huffing & puffing EVERY DAY.     Prevent the #1 cause of death- cardiovascular disease (HEART ATTACK & STROKE) by checking for normal blood pressure, cholesterol, sugars, & by not smoking.     VACCINES: Yearly FLU shot, ONE PNEUMONIA shot after 65,  SHINGLES shot after 60    Screening colonoscopy at AGE  50 & every 10 years to check for COLON CANCER,  one of the most common & preventable cancers (Or FIT kit yearly) Repeat in 3 years if POLYP found     I recommend  high fiber (5 fresh fruits or vegetables daily), low fat diet and aerobic  exercise (huffing/ puffing/ sweating for 20 min straight at least 4 days a week)    Follow up yearly with mammogram, fasting lipids, CMP, CBC prior.   ==============================================================

## 2018-09-07 ENCOUNTER — HOSPITAL ENCOUNTER (EMERGENCY)
Facility: OTHER | Age: 43
Discharge: HOME | End: 2018-09-07
Attending: EMERGENCY MEDICINE
Payer: MEDICAID

## 2018-09-07 VITALS
OXYGEN SATURATION: 96 % | HEART RATE: 92 BPM | BODY MASS INDEX: 39.24 KG/M2 | WEIGHT: 250 LBS | HEIGHT: 67 IN | DIASTOLIC BLOOD PRESSURE: 74 MMHG | TEMPERATURE: 100 F | SYSTOLIC BLOOD PRESSURE: 127 MMHG | RESPIRATION RATE: 18 BRPM

## 2018-09-07 DIAGNOSIS — J18.9 PNEUMONIA OF LEFT LOWER LOBE DUE TO INFECTIOUS ORGANISM: Primary | ICD-10-CM

## 2018-09-07 DIAGNOSIS — R05.9 COUGH: ICD-10-CM

## 2018-09-07 LAB
DEPRECATED S PYO AG THROAT QL EIA: NEGATIVE
FLUAV AG SPEC QL IA: NEGATIVE
FLUBV AG SPEC QL IA: NEGATIVE
SPECIMEN SOURCE: NORMAL

## 2018-09-07 PROCEDURE — 87880 STREP A ASSAY W/OPTIC: CPT

## 2018-09-07 PROCEDURE — 25000003 PHARM REV CODE 250: Performed by: PHYSICIAN ASSISTANT

## 2018-09-07 PROCEDURE — 87081 CULTURE SCREEN ONLY: CPT

## 2018-09-07 PROCEDURE — 99284 EMERGENCY DEPT VISIT MOD MDM: CPT

## 2018-09-07 PROCEDURE — 87400 INFLUENZA A/B EACH AG IA: CPT | Mod: 59

## 2018-09-07 RX ORDER — FLUTICASONE PROPIONATE 50 MCG
1 SPRAY, SUSPENSION (ML) NASAL 2 TIMES DAILY
Qty: 15 G | Refills: 0 | OUTPATIENT
Start: 2018-09-07 | End: 2020-01-04

## 2018-09-07 RX ORDER — ACETAMINOPHEN 500 MG
1000 TABLET ORAL
Status: COMPLETED | OUTPATIENT
Start: 2018-09-07 | End: 2018-09-07

## 2018-09-07 RX ORDER — AZITHROMYCIN 250 MG/1
250 TABLET, FILM COATED ORAL DAILY
Qty: 6 TABLET | Refills: 0 | Status: SHIPPED | OUTPATIENT
Start: 2018-09-07 | End: 2018-09-12

## 2018-09-07 RX ORDER — IBUPROFEN 600 MG/1
600 TABLET ORAL
Status: COMPLETED | OUTPATIENT
Start: 2018-09-07 | End: 2018-09-07

## 2018-09-07 RX ADMIN — IBUPROFEN 600 MG: 600 TABLET ORAL at 04:09

## 2018-09-07 RX ADMIN — ACETAMINOPHEN 1000 MG: 500 TABLET, FILM COATED ORAL at 04:09

## 2018-09-07 NOTE — ED PROVIDER NOTES
Encounter Date: 9/7/2018       History     Chief Complaint   Patient presents with    Generalized Body Aches     Pt reports body aches x 2 days w/ sore throat     Patient is a 42-year-old female with GERD, hypertension, and thyroid disease who presents to the ED with flu-like symptoms. Patient reports onset of sore throat, body aches, general malaise, nonproductive cough, and fever for the past 2 days.  She reports taking Robitussin without relief.  She reports pain with swallowing but denies dysphagia.  She denies nausea, vomiting, or diarrhea.  She denies chest pain or shortness of breath. Patient is not taking any antipyretics for her fever or symptoms today.          Review of patient's allergies indicates:  No Known Allergies  Past Medical History:   Diagnosis Date    Constipation     GI Dr Reece 2014    Gastroesophageal reflux disease without esophagitis 8/28/2018    She states she had EGD in past    Grief 9/19/2017    HTN (hypertension), benign 3/10/2015    Knee pain, left 2012    Dr Reyes, injury at work    Morbid obesity 2/23/2015    Pure hypercholesterolemia 8/29/2013    Spinal cord stimulator status 08/28/2018    In back for L knee pain, Dr Kessler surgery & pain mgmt, Lyrica or Hydrocodone at night    Thyroid disease      Past Surgical History:   Procedure Laterality Date    DILATION AND CURETTAGE OF UTERUS      HYSTEROSCOPY, WITH DILATION AND CURETTAGE OF UTERUS N/A 4/15/2013    [facility]    KNEE ARTHROSCOPY      L, Eric    KNEE SURGERY      LIGATION, FALLOPIAN TUBE, LAPAROSCOPIC Bilateral 4/15/2013    [facility]formed [facility] at [facility] OR [facility]ND [facility]LR    RESECTION-ENDOMETRIAL N/A 4/15/2013    Performed by [facility] at [facility] OR [facility]ND [facility]LR    SKIN GRAFT      left finger due to injury    SPINAL CORD STIMULATOR IMPLANT      to L knee, Dr Kessler    TUBAL LIGATION       Family History   Problem Relation Age of Onset    Hypertension Mother      Diabetes Mother     COPD Mother     Breast cancer Neg Hx     Colon cancer Neg Hx     Ovarian cancer Neg Hx      Social History     Tobacco Use    Smoking status: Never Smoker    Smokeless tobacco: Never Used   Substance Use Topics    Alcohol use: No    Drug use: No     Review of Systems   Constitutional: Positive for fatigue and fever. Negative for chills.   HENT: Positive for congestion and sore throat.    Eyes: Negative for pain.   Respiratory: Positive for cough. Negative for shortness of breath.    Cardiovascular: Negative for chest pain.   Gastrointestinal: Negative for abdominal pain, diarrhea, nausea and vomiting.   Genitourinary: Negative for dysuria.   Musculoskeletal: Positive for myalgias.   Skin: Negative for rash.   Neurological: Negative for headaches.       Physical Exam     Initial Vitals [09/07/18 1557]   BP Pulse Resp Temp SpO2   (!) 169/87 (!) 115 20 (!) 102.7 °F (39.3 °C) 98 %      MAP       --         Physical Exam    Constitutional: Vital signs are normal. She is cooperative.   Patient appears fatigued.  She is nontoxic-appearing.   HENT:   Head: Normocephalic and atraumatic.   Nasal mucosal edema present.  Mild cobblestoning to posterior oropharynx.   Eyes: EOM are normal. Pupils are equal, round, and reactive to light.   Neck: Normal range of motion. Neck supple.   Cardiovascular: Regular rhythm and intact distal pulses.   Tachycardic   Pulmonary/Chest: Breath sounds normal. She has no wheezes. She has no rhonchi. She has no rales.   Abdominal: Soft. Bowel sounds are normal. There is no tenderness.   Musculoskeletal: Normal range of motion. She exhibits no edema.   Neurological: She is alert and oriented to person, place, and time. GCS eye subscore is 4. GCS verbal subscore is 5. GCS motor subscore is 6.   Skin: Skin is warm and dry. Capillary refill takes less than 2 seconds. No rash noted.   Psychiatric: She has a normal mood and affect. Her behavior is normal.         ED Course    Procedures  Labs Reviewed   THROAT SCREEN, RAPID   INFLUENZA A AND B ANTIGEN          Imaging Results          X-Ray Chest PA And Lateral (Final result)  Result time 09/07/18 16:40:27    Final result by Anson Moses MD (09/07/18 16:40:27)                 Impression:      1. Findings concerning for developing left lower lobe consolidation, correlation advised.      Electronically signed by: Anson Moses MD  Date:    09/07/2018  Time:    16:40             Narrative:    EXAMINATION:  XR CHEST PA AND LATERAL    CLINICAL HISTORY:  Cough    TECHNIQUE:  PA and lateral views of the chest were performed.    COMPARISON:  10/13/2017    FINDINGS:  The cardiomediastinal silhouette is not enlarged..  There is no pleural effusion.  The trachea is midline.  The lungs are symmetrically expanded bilaterally with vague increased attenuation projected along the medial aspect of the left lower lung zone posteriorly, developing consolidation is of concern..  There is no pneumothorax.  The osseous structures are remarkable for degenerative change..                                 Medical Decision Making:   Initial Assessment:   Urgent evaluation of a 42 y.o. Female presenting to the emergency department complaining of flu-like symptoms. Patient is tachycardic likely secondary to her fever of 102.7.  Patient not take any antipyretics today.  She is nontoxic-appearing.   Lungs are clear auscultation bilaterally. Will provide antipyretics, obtain chest x-ray, rapid flu swab and rapid strep swab.  ED Management:  Rapid strep and rapid influenza are negative. Chest x-ray reveals a left lower lobe consolidation as concerning for pneumonia.   After patient received Tylenol Motrin, her fever and tachycardia resolved.  Patient will be sent home with Z-Tejas and Flonase.  She is advised on symptomatic care.  Patient appears well.  I do not believe blood work is indicated at this time.  Patient was given strict return precautions.  She has  no other complaints this time is stable for discharge.                       Clinical Impression:     1. Pneumonia of left lower lobe due to infectious organism    2. Cough                               Kian Montiel PA-C  09/07/18 [id number]

## 2018-09-09 LAB — BACTERIA THROAT CULT: NORMAL

## 2018-09-10 ENCOUNTER — PATIENT MESSAGE (OUTPATIENT)
Dept: FAMILY MEDICINE | Facility: CLINIC | Age: 43
End: 2018-09-10

## 2018-09-11 ENCOUNTER — TELEPHONE (OUTPATIENT)
Dept: FAMILY MEDICINE | Facility: CLINIC | Age: 43
End: 2018-09-11

## 2018-09-11 NOTE — TELEPHONE ENCOUNTER
----- Message from Mariel Feldman sent at 9/11/2018  2:05 PM CDT -----  Contact: pt [phone]  Pt would like a follow up on a [employer] message that was sent regarding her getting an ER follow up. Please advise.

## 2018-09-11 NOTE — TELEPHONE ENCOUNTER
Spoke with patient and advised her taht I did respond to her [facility] message yesterday with the list of available appointments for today  But she did not read it.  States, she's sorry, she sure didn't.  Appointment scheduled for tomorrow at 1:20.

## 2018-09-12 ENCOUNTER — OFFICE VISIT (OUTPATIENT)
Dept: FAMILY MEDICINE | Facility: CLINIC | Age: 43
End: 2018-09-12
Payer: MEDICAID

## 2018-09-12 ENCOUNTER — HOSPITAL ENCOUNTER (OUTPATIENT)
Dept: RADIOLOGY | Facility: HOSPITAL | Age: 43
Discharge: HOME | End: 2018-09-12
Attending: FAMILY MEDICINE
Payer: MEDICAID

## 2018-09-12 VITALS
DIASTOLIC BLOOD PRESSURE: 82 MMHG | BODY MASS INDEX: 40.42 KG/M2 | TEMPERATURE: 99 F | SYSTOLIC BLOOD PRESSURE: 138 MMHG | WEIGHT: 257.5 LBS | OXYGEN SATURATION: 98 % | HEIGHT: 67 IN | HEART RATE: 95 BPM

## 2018-09-12 DIAGNOSIS — J18.9 PNEUMONIA OF LEFT LOWER LOBE DUE TO INFECTIOUS ORGANISM: ICD-10-CM

## 2018-09-12 DIAGNOSIS — J18.9 PNEUMONIA OF LEFT LOWER LOBE DUE TO INFECTIOUS ORGANISM: Primary | ICD-10-CM

## 2018-09-12 DIAGNOSIS — I10 HTN (HYPERTENSION), BENIGN: ICD-10-CM

## 2018-09-12 DIAGNOSIS — R53.83 FATIGUE, UNSPECIFIED TYPE: ICD-10-CM

## 2018-09-12 PROCEDURE — 99214 OFFICE O/P EST MOD 30 MIN: CPT | Mod: S$PBB,,, | Performed by: FAMILY MEDICINE

## 2018-09-12 PROCEDURE — 99999 PR PBB SHADOW E&M-EST. PATIENT-LVL III: CPT | Mod: PBBFAC,,, | Performed by: FAMILY MEDICINE

## 2018-09-12 PROCEDURE — 99213 OFFICE O/P EST LOW 20 MIN: CPT | Mod: PBBFAC,25,PO | Performed by: FAMILY MEDICINE

## 2018-09-12 PROCEDURE — 71046 X-RAY EXAM CHEST 2 VIEWS: CPT | Mod: 26,,, | Performed by: RADIOLOGY

## 2018-09-12 PROCEDURE — 71046 X-RAY EXAM CHEST 2 VIEWS: CPT | Mod: TC,FY,PO

## 2018-09-12 RX ORDER — AMOXICILLIN AND CLAVULANATE POTASSIUM 875; 125 MG/1; MG/1
1 TABLET, FILM COATED ORAL EVERY 12 HOURS
Qty: 20 TABLET | Refills: 0 | Status: SHIPPED | OUTPATIENT
Start: 2018-09-12 | End: 2019-01-30

## 2018-09-12 RX ORDER — PROMETHAZINE HYDROCHLORIDE AND DEXTROMETHORPHAN HYDROBROMIDE 6.25; 15 MG/5ML; MG/5ML
SYRUP ORAL
Qty: 180 ML | Refills: 0 | Status: SHIPPED | OUTPATIENT
Start: 2018-09-12 | End: 2019-01-30

## 2018-09-12 NOTE — PROGRESS NOTES
Subjective:      Patient ID: Rama Moy is a 42 y.o. female.    Chief Complaint: Pneumonia (ED follow up); Neck Pain; Sore Throat; Generalized Body Aches; and Chest Pain    Here today for follow-up after seen in emergency room and diagnosed with left lower lobe pneumonia September 7th.  Her temperature was 102.7°.  She is still coughing, body aches, chest hurts, does not feel much better after finishing the Z-Tejas.  Denies shortness of breath or chest pain. Denies any fever now.  Reviewing testing in the emergency room, influenza was negative, strep culture was negative.  She denies any sick contacts    CXR: 9/7/2018    1. Findings concerning for developing left lower lobe consolidation, correlation advised.         Current Outpatient Medications on File Prior to Visit   Medication Sig    atorvastatin (LIPITOR) 40 MG tablet Take 1 tablet (40 mg total) by mouth once daily.    fluticasone (FLONASE) 50 mcg/actuation nasal spray 1 spray (50 mcg total) by Each Nare route 2 (two) times daily.    hydrOXYzine HCl (ATARAX) 25 MG tablet Take 1 tablet (25 mg total) by mouth every 6 (six) hours.    losartan (COZAAR) 50 MG tablet Take 1 tablet (50 mg total) by mouth once daily.    pantoprazole (PROTONIX) 40 MG tablet Take 1 tablet (40 mg total) by mouth once daily.    pregabalin (LYRICA) 100 MG capsule Take 100 mg by mouth 2 (two) times daily.    trazodone (DESYREL) 50 MG tablet Take 1 tablet (50 mg total) by mouth every evening.    urea (CARMOL) 40 % Crea APPLY TO THE AFFECTED AREA TWICE DAILY     Past Medical History:   Diagnosis Date    Constipation     GI Dr Reece 2014    Gastroesophageal reflux disease without esophagitis 8/28/2018    She states she had EGD in past    Grief 9/19/2017    HTN (hypertension), benign 3/10/2015    Knee pain, left 2012    Dr Reyes, injury at work    Morbid obesity 2/23/2015    Pure hypercholesterolemia 8/29/2013    Spinal cord stimulator status 08/28/2018    In back for L  "knee pain, Dr Kessler surgery & pain mgmt, Lyrica or Hydrocodone at night    Thyroid disease      Past Surgical History:   Procedure Laterality Date    DILATION AND CURETTAGE OF UTERUS      HYSTEROSCOPY, WITH DILATION AND CURETTAGE OF UTERUS N/A 4/15/2013    [facility]formed [facility] at [facility] OR 2ND [facility]LR    KNEE ARTHROSCOPY      L, Dasa    KNEE SURGERY      LIGATION, FALLOPIAN TUBE, LAPAROSCOPIC Bilateral 4/15/2013    Performed [facility] at [facility]LR    RESECTION-ENDOMETRIAL N/A 4/15/2013    Performed by [facility] at [facility]LR    SKIN GRAFT      left finger due to injury    SPINAL CORD STIMULATOR IMPLANT      to L knee, Dr Kessler    TUBAL LIGATION       Social History     Social History Narrative    Surveillance, , 1 child, nonsmoker, nondrinker, GYN White     Family History   Problem Relation Age of Onset    Hypertension Mother     Diabetes Mother     COPD Mother     Breast cancer Neg Hx     Colon cancer Neg Hx     Ovarian cancer Neg Hx      Vitals:    09/12/18 1314   BP: 138/82   Pulse: 95   Temp: 99.2 °F (37.3 °C)   TempSrc: Oral   SpO2: 98%   Weight: 116.8 kg (257 lb 8 oz)   Height: 5' 7" (1.702 m)   PainSc:   8   PainLoc: Chest     Objective:   Physical Exam   Constitutional: She appears well-developed and well-nourished. No distress.   HENT:   Right Ear: Tympanic membrane and external ear normal.   Left Ear: Tympanic membrane and external ear normal.   Nose: Mucosal edema and rhinorrhea present. Right sinus exhibits no maxillary sinus tenderness and no frontal sinus tenderness. Left sinus exhibits no maxillary sinus tenderness and no frontal sinus tenderness.   Mouth/Throat: Mucous membranes are normal. Posterior oropharyngeal erythema present. No oropharyngeal exudate.   Eyes: EOM are normal. Pupils are equal, round, and reactive to light.   Conjunctiva injected   Neck: Normal range of motion. Neck supple. No thyromegaly present.   Cardiovascular: Normal " rate, regular rhythm and normal heart sounds.   No murmur heard.  Pulmonary/Chest: Effort normal and breath sounds normal. She has no wheezes. She has no rales.   Crackles left lower lobe   Lymphadenopathy:     She has no cervical adenopathy.   Skin: Skin is warm and dry.   Nursing note and vitals reviewed.    Assessment:     1. Pneumonia of left lower lobe due to infectious organism    2. Fatigue, unspecified type    3. HTN (hypertension), benign      Plan:     Orders Placed This Encounter    X-Ray Chest PA And Lateral    CBC auto differential    Comprehensive metabolic panel    amoxicillin-clavulanate 875-125mg (AUGMENTIN) 875-125 mg per tablet    promethazine-dextromethorphan (PROMETHAZINE-DM) 6.25-15 mg/5 mL Syrp       Patient Instructions   I will email results    Plain MUCINEX twice daily with LOTS of water (so much that your urine is clear)    Let me know if you're not better

## 2018-09-12 NOTE — PATIENT INSTRUCTIONS
I will email results    Plain MUCINEX twice daily with LOTS of water (so much that your urine is clear)    Let me know if you're not better

## 2018-09-13 NOTE — PROGRESS NOTES
Hello.     Your Chest Xray does show the left lower lob pneumonia - but it's a little better    Finish the Augmentin antbiotic.     Please let me know if you have any worsening or persistent symptoms.    Take care

## 2018-09-26 RX ORDER — LOSARTAN POTASSIUM 50 MG/1
TABLET ORAL
Qty: 90 TABLET | Refills: 3 | Status: SHIPPED | OUTPATIENT
Start: 2018-09-26 | End: 2019-09-28 | Stop reason: SDUPTHER

## 2018-11-19 RX ORDER — ATORVASTATIN CALCIUM 40 MG/1
TABLET, FILM COATED ORAL
Qty: 90 TABLET | Refills: 2 | Status: SHIPPED | OUTPATIENT
Start: 2018-11-19 | End: 2020-03-31

## 2019-01-02 RX ORDER — PANTOPRAZOLE SODIUM 40 MG/1
TABLET, DELAYED RELEASE ORAL
Qty: 90 TABLET | Refills: 2 | Status: SHIPPED | OUTPATIENT
Start: 2019-01-02 | End: 2020-01-07

## 2019-01-29 ENCOUNTER — TELEPHONE (OUTPATIENT)
Dept: OBSTETRICS AND GYNECOLOGY | Facility: CLINIC | Age: 44
End: 2019-01-29

## 2019-01-29 NOTE — TELEPHONE ENCOUNTER
----- Message from Warren Marcelino sent at 1/29/2019  1:33 PM CST -----  PLEASE CALL PT SHE NEEDS TO SEE  BEFORE 02/22 SHE MISSED HER CYCLE [phone]

## 2019-01-30 ENCOUNTER — APPOINTMENT (OUTPATIENT)
Dept: LAB | Facility: HOSPITAL | Age: 44
End: 2019-01-30
Attending: OBSTETRICS & GYNECOLOGY
Payer: MEDICAID

## 2019-01-30 ENCOUNTER — OFFICE VISIT (OUTPATIENT)
Dept: OBSTETRICS AND GYNECOLOGY | Facility: CLINIC | Age: 44
End: 2019-01-30
Payer: MEDICAID

## 2019-01-30 VITALS
WEIGHT: 265.63 LBS | HEIGHT: 67 IN | BODY MASS INDEX: 41.69 KG/M2 | DIASTOLIC BLOOD PRESSURE: 80 MMHG | SYSTOLIC BLOOD PRESSURE: 140 MMHG

## 2019-01-30 DIAGNOSIS — I10 HTN (HYPERTENSION), BENIGN: ICD-10-CM

## 2019-01-30 DIAGNOSIS — N89.8 VAGINAL ITCHING: ICD-10-CM

## 2019-01-30 DIAGNOSIS — Z12.31 VISIT FOR SCREENING MAMMOGRAM: ICD-10-CM

## 2019-01-30 DIAGNOSIS — E66.01 CLASS 3 SEVERE OBESITY DUE TO EXCESS CALORIES WITH SERIOUS COMORBIDITY AND BODY MASS INDEX (BMI) OF 40.0 TO 44.9 IN ADULT: ICD-10-CM

## 2019-01-30 DIAGNOSIS — Z01.419 WELL WOMAN EXAM WITH ROUTINE GYNECOLOGICAL EXAM: Primary | ICD-10-CM

## 2019-01-30 LAB
B-HCG UR QL: NEGATIVE
CANDIDA RRNA VAG QL PROBE: POSITIVE
CTP QC/QA: YES
G VAGINALIS RRNA GENITAL QL PROBE: POSITIVE
T VAGINALIS RRNA GENITAL QL PROBE: NEGATIVE
TSH SERPL DL<=0.005 MIU/L-ACNC: 3.49 UIU/ML

## 2019-01-30 PROCEDURE — 99999 PR PBB SHADOW E&M-EST. PATIENT-LVL III: ICD-10-PCS | Mod: PBBFAC,,, | Performed by: OBSTETRICS & GYNECOLOGY

## 2019-01-30 PROCEDURE — 87480 CANDIDA DNA DIR PROBE: CPT

## 2019-01-30 PROCEDURE — 99396 PR PREVENTIVE VISIT,EST,40-64: ICD-10-PCS | Mod: S$PBB,,, | Performed by: OBSTETRICS & GYNECOLOGY

## 2019-01-30 PROCEDURE — 88175 CYTOPATH C/V AUTO FLUID REDO: CPT

## 2019-01-30 PROCEDURE — 87624 HPV HI-RISK TYP POOLED RSLT: CPT

## 2019-01-30 PROCEDURE — 84443 ASSAY THYROID STIM HORMONE: CPT

## 2019-01-30 PROCEDURE — 99396 PREV VISIT EST AGE 40-64: CPT | Mod: S$PBB,,, | Performed by: OBSTETRICS & GYNECOLOGY

## 2019-01-30 PROCEDURE — 99213 OFFICE O/P EST LOW 20 MIN: CPT | Mod: PBBFAC,PO | Performed by: OBSTETRICS & GYNECOLOGY

## 2019-01-30 PROCEDURE — 99999 PR PBB SHADOW E&M-EST. PATIENT-LVL III: CPT | Mod: PBBFAC,,, | Performed by: OBSTETRICS & GYNECOLOGY

## 2019-01-30 PROCEDURE — 81025 URINE PREGNANCY TEST: CPT | Mod: PBBFAC,PO | Performed by: OBSTETRICS & GYNECOLOGY

## 2019-01-30 PROCEDURE — 87510 GARDNER VAG DNA DIR PROBE: CPT

## 2019-01-30 RX ORDER — LIDOCAINE 50 MG/G
OINTMENT TOPICAL
Refills: 0 | COMMUNITY
Start: 2018-10-23 | End: 2020-01-04

## 2019-01-30 RX ORDER — HYDROCODONE BITARTRATE AND ACETAMINOPHEN 5; 325 MG/1; MG/1
TABLET ORAL
Refills: 0 | COMMUNITY
Start: 2019-01-09 | End: 2020-01-04

## 2019-01-30 RX ORDER — NAPROXEN SODIUM 375 MG/1
TABLET, FILM COATED, EXTENDED RELEASE ORAL
Refills: 0 | COMMUNITY
Start: 2018-10-23 | End: 2020-01-04

## 2019-01-30 RX ORDER — PREGABALIN 200 MG/1
CAPSULE ORAL
Refills: 1 | COMMUNITY
Start: 2019-01-02 | End: 2019-01-30 | Stop reason: SDUPTHER

## 2019-01-30 NOTE — PROGRESS NOTES
SUBJECTIVE:   43 y.o. female  here for annual. She reports missing her cycle which she was expecting in the middle of this month.  She also complains of weight gain despite not eating much. She is concerned it is her thyroid and says thyroid problems run in the family.    She describes her periods as usually being regular, lasting 3-4 days. normal flow.  denies break through bleeding.   complains of vaginal itching or irritation. Reports it started when she switched toilet paper.   denies vaginal discharge.  Denies incontinence of stool or urine.    She is sexually active with 1 partners.  She uses BTL for contraception.    History of abnormal pap: No  Last Pap: ( -wnl)  Last MMG: Yes - 2018  Last Colonoscopy:  Yes -  GI bleeding. Denies any complaints of constipation, diarrhea, or blood in stool and reports normal, daily BM.      Past Medical History:   Diagnosis Date    Constipation     GI Dr Reece     Gastroesophageal reflux disease without esophagitis 2018    She states she had EGD in past    Grief 2017    HTN (hypertension), benign 3/10/2015    Knee pain, left     Dr Reyes, injury at work    Morbid obesity 2015    Pure hypercholesterolemia 2013    Spinal cord stimulator status 2018    In back for L knee pain, Dr Kessler surgery & pain mgmt, Lyrica or Hydrocodone at night    Thyroid disease      Past Surgical History:   Procedure Laterality Date    DILATION AND CURETTAGE OF UTERUS      HYSTEROSCOPY, WITH DILATION AND CURETTAGE OF UTERUS N/A 4/15/2013    [facility] 2ND FLR    KNEE ARTHROSCOPY      Eric MOHAMUD    KNEE SURGERY      LIGATION, FALLOPIAN TUBE, LAPAROSCOPIC Bilateral 4/15/2013    [facility] OR 2ND FLR    RESECTION-ENDOMETRIAL N/A 4/15/2013    [facility] at [facility] OR 2ND FLR    SKIN GRAFT      left finger due to injury    SPINAL CORD STIMULATOR IMPLANT      to L  Dr Victoria rojas    TUBAL LIGATION       Social History     Socioeconomic History    Marital status:      Spouse name: Not on file    Number of children: Not on file    Years of education: Not on file    Highest education level: Not on file   Social Needs    Financial resource strain: Not on file    Food insecurity - worry: Not on file    Food insecurity - inability: Not on file    Transportation needs - medical: Not on file    Transportation needs - non-medical: Not on file   Occupational History    Occupation: works as survelliance agent   Tobacco Use    Smoking status: Never Smoker    Smokeless tobacco: Never Used   Substance and Sexual Activity    Alcohol use: No    Drug use: No    Sexual activity: Yes     Partners: Male     Birth control/protection: None   Other Topics Concern    Not on file   Social History Narrative    Surveillance, , 1 child, nonsmoker, nondrinker, GYN White     Family History   Problem Relation Age of Onset    Hypertension Mother     Diabetes Mother     COPD Mother     Breast cancer Neg Hx     Colon cancer Neg Hx     Ovarian cancer Neg Hx      OB History    Para Term  AB Living   2 1 1         SAB TAB Ectopic Multiple Live Births           1      # Outcome Date GA Lbr Vivek/2nd Weight Sex Delivery Anes PTL Lv   2             1 Term               Obstetric Comments   Pt states only pregnant one time;          Current Outpatient Medications   Medication Sig Dispense Refill    atorvastatin (LIPITOR) 40 MG tablet TAKE 1 TABLET BY MOUTH ONCE DAILY 90 tablet 2    fluticasone (FLONASE) 50 mcg/actuation nasal spray 1 spray (50 mcg total) by Each Nare route 2 (two) times daily. 15 g 0    HYDROcodone-acetaminophen (NORCO) 5-325 mg per tablet TK 1 T PO ONCE D PRN P  0    hydrOXYzine HCl (ATARAX) 25 MG tablet Take 1 tablet (25 mg total) by mouth every 6 (six) hours. 12 tablet 0    lidocaine (XYLOCAINE) 5 % Oint ointment Apply 2-3 grams to  "affected areas 3 times daily or as directed. Max 10 grams/day.  0    losartan (COZAAR) 50 MG tablet TAKE 1 TABLET BY MOUTH ONCE DAILY 90 tablet 3    naproxen sodium 375 mg TM24 Take 2 to 3 tablets by mouth every morning as needed for pain.  0    pantoprazole (PROTONIX) 40 MG tablet TAKE 1 TABLET BY MOUTH EVERY DAY 90 tablet 2    pregabalin (LYRICA) 100 MG capsule Take 100 mg by mouth 2 (two) times daily.      trazodone (DESYREL) 50 MG tablet Take 1 tablet (50 mg total) by mouth every evening. 30 tablet 11     No current facility-administered medications for this visit.      Allergies: Patient has no known allergies.     ROS:  Constitutional: no weight loss, weight gain, fever, fatigue  Eyes:  No vision changes, glasses/contacts  ENT/Mouth: No ulcers, sinus problems, ears ringing, headache  Cardiovascular: No inability to lie flat, chest pain, exercise intolerance, swelling, heart palpitations  Respiratory: No wheezing, coughing blood, shortness of breath, or cough  Gastrointestinal: No diarrhea, bloody stool, nausea/vomiting, constipation, gas, hemorrhoids  Genitourinary: No blood in urine, painful urination, urgency of urination, frequency of urination, incomplete emptying, incontinence, abnormal bleeding, painful periods, heavy periods, vaginal discharge, vaginal odor, painful intercourse, sexual problems, bleeding after intercourse.  Musculoskeletal: No muscle weakness  Skin/Breast: No painful breasts, nipple discharge, masses, rash, ulcers  Neurological: No passing out, seizures, numbness, headache  Endocrine: No diabetes, hypothyroid, hyperthyroid, hot flashes, hair loss, abnormal hair growth, ance  Psychiatric: No depression, crying  Hematologic: No bruises, bleeding, swollen lymph nodes, anemia.      OBJECTIVE:   The patient appears well, alert, oriented x 3, in no distress.  BP (!) 140/80   Ht 5' 7" (1.702 m)   Wt 120.5 kg (265 lb 10.5 oz)   LMP 12/17/2018 (Exact Date)   BMI 41.61 kg/m²   NECK: no " thyromegaly, trachea midline  SKIN: no acne, striae, hirsutism  BREAST EXAM: breasts appear normal, no suspicious masses, no skin or nipple changes or axillary nodes  ABDOMEN: no hernias, masses, or hepatosplenomegaly  GENITALIA: normal external genitalia, no erythema, no discharge  URETHRA: normal urethra, normal urethral meatus  VAGINA: Normal. Blood in vagina, likely starting her period  CERVIX: no lesions or cervical motion tenderness  UTERUS: normal size, contour, position, consistency, mobility, non-tender  ADNEXA: normal adnexa and no mass, fullness, tenderness      ASSESSMENT:   Sia was seen today for amenorrhea.    Diagnoses and all orders for this visit:    Well woman exam with routine gynecological exam  -     Liquid-based pap smear, screening  -     HPV High Risk Genotypes, PCR  -     POCT urine pregnancy    Visit for screening mammogram  -     Mammo Digital Screening Bilat; Future    Class 3 severe obesity due to excess calories with serious comorbidity and body mass index (BMI) of 40.0 to 44.9 in adult    HTN (hypertension), benign      - Discussed that pt may be perimenopausal given late cycle. Cycle started today. Discussed precautions to call if irregular bleeding. EMBx in 2016 neg.  - Mammogram due in 7/2019  - Pap today, will f/u  - Affirm today, will f/u  - TSH today, will f/u  - Referral to GI for h/o GI bleeding and colonoscopy

## 2019-01-31 DIAGNOSIS — B37.31 VAGINAL CANDIDIASIS: Primary | ICD-10-CM

## 2019-01-31 DIAGNOSIS — N76.0 BACTERIAL VAGINOSIS: ICD-10-CM

## 2019-01-31 DIAGNOSIS — B96.89 BACTERIAL VAGINOSIS: ICD-10-CM

## 2019-01-31 RX ORDER — FLUCONAZOLE 150 MG/1
150 TABLET ORAL DAILY
Qty: 1 TABLET | Refills: 1 | Status: SHIPPED | OUTPATIENT
Start: 2019-01-31 | End: 2019-02-01

## 2019-01-31 RX ORDER — METRONIDAZOLE 500 MG/1
500 TABLET ORAL EVERY 12 HOURS
Qty: 10 TABLET | Refills: 0 | Status: SHIPPED | OUTPATIENT
Start: 2019-01-31 | End: 2019-02-05

## 2019-02-05 LAB
HPV HR 12 DNA CVX QL NAA+PROBE: NEGATIVE
HPV16 AG SPEC QL: NEGATIVE
HPV18 DNA SPEC QL NAA+PROBE: NEGATIVE

## 2019-05-02 ENCOUNTER — OFFICE VISIT (OUTPATIENT)
Dept: FAMILY MEDICINE | Facility: CLINIC | Age: 44
End: 2019-05-02
Payer: MEDICAID

## 2019-05-02 VITALS
BODY MASS INDEX: 41 KG/M2 | TEMPERATURE: 98 F | WEIGHT: 261.25 LBS | HEART RATE: 70 BPM | DIASTOLIC BLOOD PRESSURE: 84 MMHG | SYSTOLIC BLOOD PRESSURE: 138 MMHG | HEIGHT: 67 IN

## 2019-05-02 DIAGNOSIS — Z96.89 SPINAL CORD STIMULATOR STATUS: Primary | ICD-10-CM

## 2019-05-02 DIAGNOSIS — G89.29 CHRONIC PAIN OF LEFT KNEE: ICD-10-CM

## 2019-05-02 DIAGNOSIS — M25.562 CHRONIC PAIN OF LEFT KNEE: ICD-10-CM

## 2019-05-02 PROCEDURE — 99999 PR PBB SHADOW E&M-EST. PATIENT-LVL III: ICD-10-PCS | Mod: PBBFAC,,, | Performed by: FAMILY MEDICINE

## 2019-05-02 PROCEDURE — 99214 PR OFFICE/OUTPT VISIT, EST, LEVL IV, 30-39 MIN: ICD-10-PCS | Mod: S$PBB,,, | Performed by: FAMILY MEDICINE

## 2019-05-02 PROCEDURE — 99213 OFFICE O/P EST LOW 20 MIN: CPT | Mod: PBBFAC,PO | Performed by: FAMILY MEDICINE

## 2019-05-02 PROCEDURE — 99214 OFFICE O/P EST MOD 30 MIN: CPT | Mod: S$PBB,,, | Performed by: FAMILY MEDICINE

## 2019-05-02 PROCEDURE — 99999 PR PBB SHADOW E&M-EST. PATIENT-LVL III: CPT | Mod: PBBFAC,,, | Performed by: FAMILY MEDICINE

## 2019-05-02 RX ORDER — HYDROCHLOROTHIAZIDE 12.5 MG/1
12.5 CAPSULE ORAL DAILY
Qty: 30 CAPSULE | Refills: 11 | Status: SHIPPED | OUTPATIENT
Start: 2019-05-02 | End: 2020-05-27

## 2019-05-02 NOTE — PROGRESS NOTES
Subjective:      Patient ID: Rama Moy is a 43 y.o. female.    Chief Complaint: Joint Swelling (left knee)    Here today for left knee joint swelling.  She has spinal cord stimulator after her knee surgery 2012 Dr Reyes.  The pain is persistent, she takes Lyrica daily along with 1 naproxen and Protonix (denies stomach upset)  She has also seen pain management.  Compressive sleeve helped only slightly, then it becomes more tender.  it is more swollen and painful to walk, she is asking if diuretic might help.  She also has hydrocodone 5 mg, she takes half a tablet every once in a while, but not even once a week.  She does not like the side effects.  She is requesting referral to another orthopedist    Today she saw therapist for grief after her mom passed 2017      Current Outpatient Medications:     atorvastatin (LIPITOR) 40 MG tablet, TAKE 1 TABLET BY MOUTH ONCE DAILY, Disp: 90 tablet, Rfl: 2    HYDROcodone-acetaminophen (NORCO) 5-325 mg per tablet, TK 1 T PO ONCE D PRN P, Disp: , Rfl: 0    hydrOXYzine HCl (ATARAX) 25 MG tablet, Take 1 tablet (25 mg total) by mouth every 6 (six) hours., Disp: 12 tablet, Rfl: 0    lidocaine (XYLOCAINE) 5 % Oint ointment, Apply 2-3 grams to affected areas 3 times daily or as directed. Max 10 grams/day., Disp: , Rfl: 0    losartan (COZAAR) 50 MG tablet, TAKE 1 TABLET BY MOUTH ONCE DAILY, Disp: 90 tablet, Rfl: 3    naproxen sodium 375 mg TM24, Take 2 to 3 tablets by mouth every morning as needed for pain., Disp: , Rfl: 0    pantoprazole (PROTONIX) 40 MG tablet, TAKE 1 TABLET BY MOUTH EVERY DAY, Disp: 90 tablet, Rfl: 2    pregabalin (LYRICA) 100 MG capsule, Take 100 mg by mouth 2 (two) times daily., Disp: , Rfl:     trazodone (DESYREL) 50 MG tablet, Take 1 tablet (50 mg total) by mouth every evening., Disp: 30 tablet, Rfl: 11    fluticasone (FLONASE) 50 mcg/actuation nasal spray, 1 spray (50 mcg total) by Each Nare route 2 (two) times daily., Disp: 15 g, Rfl: 0     "hydroCHLOROthiazide (MICROZIDE) 12.5 mg capsule, Take 1 capsule (12.5 mg total) by mouth once daily., Disp: 30 capsule, Rfl: 11      Past Medical History:   Diagnosis Date    Constipation     GI Dr Reece 2014    Gastroesophageal reflux disease without esophagitis 8/28/2018    She states she had EGD in past    Grief 09/19/2017    mom passed 2017    HTN (hypertension), benign 3/10/2015    Knee pain, left 2012    Dr Reyes, injury at work    Morbid obesity 2/23/2015    Pure hypercholesterolemia 8/29/2013    Spinal cord stimulator status 08/28/2018    In back for L knee pain, Dr Kessler surgery & pain mgmt, Lyrica or Hydrocodone at night    Thyroid disease      Past Surgical History:   Procedure Laterality Date    DILATION AND CURETTAGE OF UTERUS      HYSTEROSCOPY, WITH DILATION AND CURETTAGE OF UTERUS N/A 4/15/2013    Performed by [facility] at [facility] OR 2ND FLR    KNEE ARTHROSCOPY      L, Dasa    KNEE SURGERY      LIGATION, FALLOPIAN TUBE, LAPAROSCOPIC Bilateral 4/15/2013    Performed [facility] at [facility] OR 2ND FLR    RESECTION-ENDOMETRIAL N/A 4/15/2013    Performed by [facility] at [facility] OR 2ND FLR    SKIN GRAFT      left finger due to injury    SPINAL CORD STIMULATOR IMPLANT      to L knee, Dr Kessler    TUBAL LIGATION       Social History     Social History Narrative    Surveillance, , 1 child, nonsmoker, nondrinker, GYN White     Family History   Problem Relation Age of Onset    Hypertension Mother     Diabetes Mother     COPD Mother     Breast cancer Neg Hx     Colon cancer Neg Hx     Ovarian cancer Neg Hx      Vitals:    05/02/19 1442   BP: 138/84   Pulse: 70   Temp: 98 °F (36.7 °C)   TempSrc: Oral   Weight: 118.5 kg (261 lb 3.9 oz)   Height: 5' 7" (1.702 m)   PainSc:   8   PainLoc: Knee     Objective:   Physical Exam   Musculoskeletal:   Left knee mild swelling  tenderness on left lower thigh near knee, healed scar, some crepitance, she does have  pain with walking " along the joint line,2+ pulses, less than 2 second capillary refill       Assessment:     1. Spinal cord stimulator status    2. Chronic pain of left knee      Plan:     Orders Placed This Encounter    Ambulatory referral to Orthopedics    hydroCHLOROthiazide (MICROZIDE) 12.5 mg capsule     Continue with therapist  Let me know if symptoms worsen or persist    Continue other medications     follow up yearly with fasting labs prior     wThere are no Patient Instructions on file for this visit.

## 2019-09-30 RX ORDER — LOSARTAN POTASSIUM 50 MG/1
TABLET ORAL
Qty: 90 TABLET | Refills: 3 | Status: SHIPPED | OUTPATIENT
Start: 2019-09-30 | End: 2020-11-16 | Stop reason: SDUPTHER

## 2020-01-04 ENCOUNTER — HOSPITAL ENCOUNTER (EMERGENCY)
Facility: OTHER | Age: 45
Discharge: HOME | End: 2020-01-04
Attending: EMERGENCY MEDICINE
Payer: MEDICAID

## 2020-01-04 VITALS
RESPIRATION RATE: 20 BRPM | BODY MASS INDEX: 37.67 KG/M2 | TEMPERATURE: 98 F | OXYGEN SATURATION: 99 % | HEIGHT: 67 IN | DIASTOLIC BLOOD PRESSURE: 70 MMHG | WEIGHT: 240 LBS | HEART RATE: 88 BPM | SYSTOLIC BLOOD PRESSURE: 154 MMHG

## 2020-01-04 DIAGNOSIS — B34.9 VIRAL SYNDROME: Primary | ICD-10-CM

## 2020-01-04 LAB
B-HCG UR QL: NEGATIVE
CTP QC/QA: YES

## 2020-01-04 PROCEDURE — 81025 URINE PREGNANCY TEST: CPT | Performed by: EMERGENCY MEDICINE

## 2020-01-04 PROCEDURE — 99283 EMERGENCY DEPT VISIT LOW MDM: CPT | Mod: 25

## 2020-01-04 RX ORDER — ONDANSETRON 4 MG/1
4 TABLET, ORALLY DISINTEGRATING ORAL EVERY 6 HOURS PRN
Qty: 20 TABLET | Refills: 0 | Status: SHIPPED | OUTPATIENT
Start: 2020-01-04 | End: 2021-03-02 | Stop reason: ALTCHOICE

## 2020-01-04 RX ORDER — BENZONATATE 100 MG/1
100 CAPSULE ORAL 3 TIMES DAILY PRN
Qty: 30 CAPSULE | Refills: 0 | Status: SHIPPED | OUTPATIENT
Start: 2020-01-04 | End: 2020-01-14

## 2020-01-04 RX ORDER — FLUTICASONE PROPIONATE 50 MCG
1 SPRAY, SUSPENSION (ML) NASAL 2 TIMES DAILY PRN
Qty: 15 G | Refills: 0 | Status: SHIPPED | OUTPATIENT
Start: 2020-01-04 | End: 2021-01-14

## 2020-01-04 RX ORDER — ETODOLAC 400 MG/1
400 TABLET, FILM COATED ORAL 2 TIMES DAILY PRN
Qty: 20 TABLET | Refills: 0 | Status: SHIPPED | OUTPATIENT
Start: 2020-01-04 | End: 2020-02-12

## 2020-01-04 NOTE — ED PROVIDER NOTES
Encounter Date: 1/4/2020    SCRIBE #1 NOTE: I, Rohini Vaughn, am scribing for, and in the presence of, Dr. Carranza.       History     Chief Complaint   Patient presents with    Cough     Pt reports non-productive, sore throat, nasal drainage for the past two days not relieved with robitussin and benadryl.      Time seen by provider: 8:48 AM    This is a 44 y.o. female who presents with complaint of non-productive cough for the past two days. She reports associated rhinorrhea, sore throat, chills, myalgias, and one episode of vomiting. Patient denies fever, nausea, diarrhea, dysuria, chest pain, or SOB. She denies any alleviating or exacerbating factors. This is the extent of the patient's complaints at this time.    The history is provided by the patient.     Review of patient's allergies indicates:  No Known Allergies  Past Medical History:   Diagnosis Date    Constipation     GI Dr Reece 2014    Gastroesophageal reflux disease without esophagitis 8/28/2018    She states she had EGD in past    Grief 09/19/2017    mom passed 2017    HTN (hypertension), benign 3/10/2015    Knee pain, left 2012    Dr eRyes, injury at work    Mixed hyperlipidemia 8/29/2013    Morbid obesity 2/23/2015    Spinal cord stimulator status 08/28/2018    In back for L knee pain, Dr Kessler surgery & pain mgmt, Lyrica or Hydrocodone at night    Thyroid disease      Past Surgical History:   Procedure Laterality Date    DILATION AND CURETTAGE OF UTERUS      KNEE ARTHROSCOPY      Eric MOHAMUD    KNEE SURGERY      SKIN GRAFT      left finger due to injury    SPINAL CORD STIMULATOR IMPLANT      to L knee, Dr Kessler    TUBAL LIGATION       Family History   Problem Relation Age of Onset    Hypertension Mother     Diabetes Mother     COPD Mother     Breast cancer Neg Hx     Colon cancer Neg Hx     Ovarian cancer Neg Hx      Social History     Tobacco Use    Smoking status: Never Smoker    Smokeless tobacco: Never Used   Substance Use  Topics    Alcohol use: No    Drug use: No     Review of Systems   Constitutional: Positive for chills. Negative for fever.   HENT: Positive for rhinorrhea and sore throat.    Eyes: Positive for discharge.   Respiratory: Positive for cough. Negative for shortness of breath.    Cardiovascular: Negative for chest pain.   Gastrointestinal: Positive for vomiting. Negative for nausea.   Genitourinary: Negative for dysuria.   Musculoskeletal: Positive for neck pain. Negative for back pain.   Skin: Negative for rash.   Neurological: Negative for weakness.   Hematological: Does not bruise/bleed easily.   All other systems reviewed and are negative.      Physical Exam     Initial Vitals [01/04/20 0841]   BP Pulse Resp Temp SpO2   (!) 162/73 94 18 98.9 °F (37.2 °C) 98 %      MAP       --         Physical Exam    Nursing note and vitals reviewed.  Constitutional: She appears well-developed and well-nourished. She is cooperative.  Non-toxic appearance. No distress.   HENT:   Head: Normocephalic and atraumatic.   Right Ear: Tympanic membrane is bulging. Tympanic membrane is not erythematous.   Left Ear: Tympanic membrane is bulging. Tympanic membrane is not erythematous.   Nose: Rhinorrhea (clear) present.   Mouth/Throat: Posterior oropharyngeal erythema (slight) present. No oropharyngeal exudate or posterior oropharyngeal edema.   No tenderness to palpation over sinuses.   Eyes: Conjunctivae and EOM are normal. Pupils are equal, round, and reactive to light.   Neck: Normal range of motion and full passive range of motion without pain. Neck supple. No thyromegaly present. No JVD present.   Cardiovascular: Normal rate, regular rhythm, normal heart sounds and normal pulses. Exam reveals no gallop and no friction rub.    No murmur heard.  Pulmonary/Chest: Effort normal and breath sounds normal. No respiratory distress. She has no wheezes. She has no rhonchi. She has no rales.   Abdominal: Soft. Normal appearance and bowel sounds  are normal. She exhibits no distension and no mass. There is no tenderness. There is no rebound and no guarding.   Musculoskeletal: Normal range of motion.   Neurological: She is alert and oriented to person, place, and time. She has normal strength. No cranial nerve deficit or sensory deficit.   Skin: Skin is warm, dry and intact. Capillary refill takes less than 2 seconds. No rash noted.   Psychiatric: She has a normal mood and affect. Her speech is normal. Thought content normal.         ED Course   Procedures  Labs Reviewed   POCT URINE PREGNANCY          Imaging Results    None          Medical Decision Making:   History:   Old Medical Records: I decided to obtain old medical records.  Differential Diagnosis:   Viral syndrome, influenza, sepsis, pneumonia, central nervous system infection, thyroid storm, drug reaction, neuroleptic malignant syndrome, serotonin syndrome, malignant hyperthermia, cavernous sinus thrombosis, pneumonia, acute respiratory distress syndrome, respiratory failure, endocarditis, pericarditis, meningitis, encephalitis, malaria, novel viruses, acute retroviral infection, peritonsillar abscess, retropharyngeal abscess, epiglottitis, dental infections    Clinical Tests:   Lab Tests: Ordered and Reviewed  ED Management:  After taking into careful account the patient's historical factors, physical exam findings, empirical and objective data obtained on ED workup, the patient appears to be low risk for an emergent medical condition. I feel it is safe and appropriate at this time for the patient to be discharged for follow up and re-evaluation as detailed in the discharge instructions. I have discussed the specifics of the workup with the patient/guardian and the patient/guardian has verbalized understanding of the details of the workup, the diagnosis, the treatment plan, and the need for outpatient follow-up.  Although the patient has no emergent etiology today this does not preclude the  development of an emergent condition some in addition, I have advised the patient that they can return to the ED and/or activated EMS at any time with worsening of her symptoms, change of their symptoms, or with any other medical complaints.  Patient's/guardian understands the emergency department visit today was primarily to address immediate concerns and to rule out emergent causes of the symptoms that may require further workup and evaluation as an outpatient.  All questions addressed and patient's/guardian given discharge instructions and follow-up information.  Patient improved with treatment in the emergency department and is comfortable going home.  Educated the patient on warning signs and symptoms for which they must seek immediate medical attention.  I emphasized the importance of followup.  Patient understands that the emergency visit today is primarily to address immediate concerns and to rule out emergent cause of symptoms and that they may require further workup and evaluation as an outpatient. All questions addressed and patient given discharge instructions and followup information.               Scribe Attestation:   Scribe #1: I performed the above scribed service and the documentation accurately describes the services I performed. I attest to the accuracy of the note.    Attending Attestation:           Physician Attestation for Scribe:  Physician Attestation Statement for Scribe #1: I, Dr. Carranza, reviewed documentation, as scribed by Rohini Vaughn in my presence, and it is both accurate and complete.                               Clinical Impression:     1. Viral syndrome                                Ancelmo Carranza MD  01/04/20 0049

## 2020-01-04 NOTE — ED TRIAGE NOTES
Pt presents to ED8.  Pt c/o sore throat, body aches, sinus drainage and congestion x2 days.  She has been taking Robitussin and Benedryl with no relief of symptoms.  Changed into gown, placed on monitor.

## 2020-01-06 ENCOUNTER — TELEPHONE (OUTPATIENT)
Dept: PRIMARY CARE CLINIC | Facility: CLINIC | Age: 45
End: 2020-01-06

## 2020-01-06 NOTE — TELEPHONE ENCOUNTER
----- Message from Andre Huffman sent at 1/6/2020 12:49 PM CST -----  Contact: Patient [phone]  Patient would like to get medical advice.  Symptoms (please be specific):  Cold   How long has patient had these symptoms:  4 Days ago   Pharmacy name and phone #:  [id number] - [address] [phone] (Phone) [phone] (Fax)      Comments: Patient is requesting for office to give a call if can fit in, stating has a cold and not urgent slots available, stating is having body pain and a headache, stating nasal spray was prescribed but not working.    Please call an advise  Thank you

## 2020-01-06 NOTE — TELEPHONE ENCOUNTER
Pt advised Dr. Shah not in clinic on Mondays.  Her Tuesday scheduled is already completely booked.  Discuss going to [facility] clinic tonight.  Pt provided with locations and closing times.  Another option would be to call back lst thing tomorrow morning for a same day appt with one of our other providers.

## 2020-01-07 RX ORDER — PANTOPRAZOLE SODIUM 40 MG/1
TABLET, DELAYED RELEASE ORAL
Qty: 90 TABLET | Refills: 3 | Status: SHIPPED | OUTPATIENT
Start: 2020-01-07 | End: 2020-11-23 | Stop reason: SDUPTHER

## 2020-02-10 ENCOUNTER — PATIENT OUTREACH (OUTPATIENT)
Dept: ADMINISTRATIVE | Facility: OTHER | Age: 45
End: 2020-02-10

## 2020-02-10 NOTE — PROGRESS NOTES
Care Everywhere: n/a  Immunization: reviewed, no profile in links   Health Maintenance: updated  Media Review: reviewed for possible outside mammogram report   Legacy Review: n/a  Order placed: n/a  Upcoming appts:n/a

## 2020-02-12 ENCOUNTER — OFFICE VISIT (OUTPATIENT)
Dept: OBSTETRICS AND GYNECOLOGY | Facility: CLINIC | Age: 45
End: 2020-02-12
Payer: MEDICAID

## 2020-02-12 VITALS
WEIGHT: 270.94 LBS | BODY MASS INDEX: 42.53 KG/M2 | SYSTOLIC BLOOD PRESSURE: 130 MMHG | DIASTOLIC BLOOD PRESSURE: 90 MMHG | HEIGHT: 67 IN

## 2020-02-12 DIAGNOSIS — Z12.31 VISIT FOR SCREENING MAMMOGRAM: ICD-10-CM

## 2020-02-12 DIAGNOSIS — I10 HTN (HYPERTENSION), BENIGN: ICD-10-CM

## 2020-02-12 DIAGNOSIS — Z01.419 WELL WOMAN EXAM WITH ROUTINE GYNECOLOGICAL EXAM: Primary | ICD-10-CM

## 2020-02-12 DIAGNOSIS — N91.4 SECONDARY OLIGOMENORRHEA: ICD-10-CM

## 2020-02-12 DIAGNOSIS — E66.01 CLASS 3 SEVERE OBESITY DUE TO EXCESS CALORIES WITH SERIOUS COMORBIDITY AND BODY MASS INDEX (BMI) OF 40.0 TO 44.9 IN ADULT: ICD-10-CM

## 2020-02-12 PROCEDURE — 99213 OFFICE O/P EST LOW 20 MIN: CPT | Mod: PBBFAC,PO | Performed by: OBSTETRICS & GYNECOLOGY

## 2020-02-12 PROCEDURE — 99999 PR PBB SHADOW E&M-EST. PATIENT-LVL III: CPT | Mod: PBBFAC,,, | Performed by: OBSTETRICS & GYNECOLOGY

## 2020-02-12 PROCEDURE — 99396 PR PREVENTIVE VISIT,EST,40-64: ICD-10-PCS | Mod: S$PBB,,, | Performed by: OBSTETRICS & GYNECOLOGY

## 2020-02-12 PROCEDURE — 99396 PREV VISIT EST AGE 40-64: CPT | Mod: S$PBB,,, | Performed by: OBSTETRICS & GYNECOLOGY

## 2020-02-12 PROCEDURE — 99999 PR PBB SHADOW E&M-EST. PATIENT-LVL III: ICD-10-PCS | Mod: PBBFAC,,, | Performed by: OBSTETRICS & GYNECOLOGY

## 2020-02-12 NOTE — PROGRESS NOTES
SUBJECTIVE:   44 y.o. female   for annual routine Pap and checkup. Patient's last menstrual period was 2020..  She complains of missing 4 cycles.        Past Medical History:   Diagnosis Date    Constipation     GI Dr Reece     Gastroesophageal reflux disease without esophagitis 2018    She states she had EGD in past    Grief 2017    mom passed 2017    HTN (hypertension), benign 3/10/2015    Knee pain, left     Dr Reyes, injury at work    Mixed hyperlipidemia 2013    Morbid obesity 2015    Spinal cord stimulator status 2018    In back for L knee pain, Dr Kessler surgery & pain mgmt, Lyrica or Hydrocodone at night    Thyroid disease      Past Surgical History:   Procedure Laterality Date    DILATION AND CURETTAGE OF UTERUS      KNEE ARTHROSCOPY      L, Eric    KNEE SURGERY      SKIN GRAFT      left finger due to injury    SPINAL CORD STIMULATOR IMPLANT      to L knee, Dr Kessler    TUBAL LIGATION       Social History     Socioeconomic History    Marital status:      Spouse name: Not on file    Number of children: Not on file    Years of education: Not on file    Highest education level: Not on file   Occupational History    Occupation: works as survelliance agent   Social Needs    Financial resource strain: Not on file    Food insecurity:     Worry: Not on file     Inability: Not on file    Transportation needs:     Medical: Not on file     Non-medical: Not on file   Tobacco Use    Smoking status: Never Smoker    Smokeless tobacco: Never Used   Substance and Sexual Activity    Alcohol use: No    Drug use: No    Sexual activity: Yes     Partners: Male     Birth control/protection: None   Lifestyle    Physical activity:     Days per week: Not on file     Minutes per session: Not on file    Stress: Not on file   Relationships    Social connections:     Talks on phone: Not on file     Gets together: Not on file     Attends Latter day service:  Not on file     Active member of club or organization: Not on file     Attends meetings of clubs or organizations: Not on file     Relationship status: Not on file   Other Topics Concern    Not on file   Social History Narrative    Surveillance, , 1 child, nonsmoker, nondrinker, GYN White     Family History   Problem Relation Age of Onset    Hypertension Mother     Diabetes Mother     COPD Mother     Breast cancer Neg Hx     Colon cancer Neg Hx     Ovarian cancer Neg Hx      OB History    Para Term  AB Living   2 1 1         SAB TAB Ectopic Multiple Live Births           1      # Outcome Date GA Lbr Vivek/2nd Weight Sex Delivery Anes PTL Lv   2             1 Term               Obstetric Comments   Pt states only pregnant one time;          Current Outpatient Medications   Medication Sig Dispense Refill    atorvastatin (LIPITOR) 40 MG tablet TAKE 1 TABLET BY MOUTH ONCE DAILY 90 tablet 2    fluticasone propionate (FLONASE) 50 mcg/actuation nasal spray 1 spray (50 mcg total) by Each Nostril route 2 (two) times daily as needed for Rhinitis. 15 g 0    hydroCHLOROthiazide (MICROZIDE) 12.5 mg capsule Take 1 capsule (12.5 mg total) by mouth once daily. 30 capsule 11    losartan (COZAAR) 50 MG tablet TAKE 1 TABLET BY MOUTH EVERY DAY 90 tablet 3    ondansetron (ZOFRAN-ODT) 4 MG TbDL Take 1 tablet (4 mg total) by mouth every 6 (six) hours as needed. 20 tablet 0    pantoprazole (PROTONIX) 40 MG tablet TAKE 1 TABLET BY MOUTH ONCE DAILY 90 tablet 3    pregabalin (LYRICA) 100 MG capsule Take 100 mg by mouth 2 (two) times daily.      trazodone (DESYREL) 50 MG tablet Take 1 tablet (50 mg total) by mouth every evening. 30 tablet 11     No current facility-administered medications for this visit.      Allergies: Patient has no known allergies.     ROS:  Constitutional: no weight loss, weight gain, fever, fatigue  Eyes:  No vision changes, glasses/contacts  ENT/Mouth: No ulcers, sinus problems,  "ears ringing, headache  Cardiovascular: No inability to lie flat, chest pain, exercise intolerance, swelling, heart palpitations  Respiratory: No wheezing, coughing blood, shortness of breath, or cough  Gastrointestinal: No diarrhea, bloody stool, nausea/vomiting, constipation, gas, hemorrhoids  Genitourinary: No blood in urine, painful urination, urgency of urination, frequency of urination, incomplete emptying, incontinence, abnormal bleeding, painful periods, heavy periods, vaginal discharge, vaginal odor, painful intercourse, sexual problems, bleeding after intercourse.  Musculoskeletal: No muscle weakness  Skin/Breast: No painful breasts, nipple discharge, masses, rash, ulcers  Neurological: No passing out, seizures, numbness, headache  Endocrine: No diabetes, hypothyroid, hyperthyroid, hot flashes, hair loss, abnormal hair growth, ance  Psychiatric: No depression, crying  Hematologic: No bruises, bleeding, swollen lymph nodes, anemia.      OBJECTIVE:   The patient appears well, alert, oriented x 3, in no distress.  BP (!) 130/90   Ht 5' 7" (1.702 m)   Wt 122.9 kg (270 lb 15.1 oz)   LMP 02/04/2020   BMI 42.44 kg/m²   NECK: no thyromegaly, trachea midline  SKIN: no acne, striae, hirsutism  BREAST EXAM: breasts appear normal, no suspicious masses, no skin or nipple changes or axillary nodes  ABDOMEN: no hernias, masses, or hepatosplenomegaly  GENITALIA: normal external genitalia, no erythema, no discharge  URETHRA: normal urethra, normal urethral meatus  VAGINA: Normal  CERVIX: no lesions or cervical motion tenderness  UTERUS: normal size, contour, position, consistency, mobility, non-tender  ADNEXA: normal adnexa and no mass, fullness, tenderness    \  ASSESSMENT:   Sia was seen today for well woman.    Diagnoses and all orders for this visit:    Well woman exam with routine gynecological exam    Visit for screening mammogram  -     Mammo Digital Screening Bilat w/ Genaro; Future    HTN (hypertension), " benign    Class 3 severe obesity due to excess calories with serious comorbidity and body mass index (BMI) of 40.0 to 44.9 in adult    Secondary oligomenorrhea  -     TSH; Future

## 2020-02-14 ENCOUNTER — LAB VISIT (OUTPATIENT)
Dept: LAB | Facility: OTHER | Age: 45
End: 2020-02-14
Attending: OBSTETRICS & GYNECOLOGY
Payer: MEDICAID

## 2020-02-14 DIAGNOSIS — N91.4 SECONDARY OLIGOMENORRHEA: ICD-10-CM

## 2020-02-14 LAB — TSH SERPL DL<=0.005 MIU/L-ACNC: 2.35 UIU/ML (ref 0.4–4)

## 2020-02-14 PROCEDURE — 84443 ASSAY THYROID STIM HORMONE: CPT

## 2020-02-14 PROCEDURE — 36415 COLL VENOUS BLD VENIPUNCTURE: CPT

## 2020-02-15 ENCOUNTER — PATIENT MESSAGE (OUTPATIENT)
Dept: OBSTETRICS AND GYNECOLOGY | Facility: CLINIC | Age: 45
End: 2020-02-15

## 2020-03-06 ENCOUNTER — HOSPITAL ENCOUNTER (OUTPATIENT)
Dept: RADIOLOGY | Facility: OTHER | Age: 45
Discharge: HOME | End: 2020-03-06
Attending: OBSTETRICS & GYNECOLOGY
Payer: MEDICAID

## 2020-03-06 DIAGNOSIS — Z12.31 VISIT FOR SCREENING MAMMOGRAM: ICD-10-CM

## 2020-03-06 PROCEDURE — 77067 SCR MAMMO BI INCL CAD: CPT | Mod: 26,,, | Performed by: INTERNAL MEDICINE

## 2020-03-06 PROCEDURE — 77067 MAMMO DIGITAL SCREENING BILAT WITH TOMOSYNTHESIS_CAD: ICD-10-PCS | Mod: 26,,, | Performed by: INTERNAL MEDICINE

## 2020-03-06 PROCEDURE — 77067 SCR MAMMO BI INCL CAD: CPT | Mod: TC

## 2020-03-06 PROCEDURE — 77063 BREAST TOMOSYNTHESIS BI: CPT | Mod: 26,,, | Performed by: INTERNAL MEDICINE

## 2020-03-06 PROCEDURE — 77063 MAMMO DIGITAL SCREENING BILAT WITH TOMOSYNTHESIS_CAD: ICD-10-PCS | Mod: 26,,, | Performed by: INTERNAL MEDICINE

## 2020-03-17 ENCOUNTER — TELEPHONE (OUTPATIENT)
Dept: PRIMARY CARE CLINIC | Facility: CLINIC | Age: 45
End: 2020-03-17

## 2020-03-18 ENCOUNTER — OFFICE VISIT (OUTPATIENT)
Dept: PRIMARY CARE CLINIC | Facility: CLINIC | Age: 45
End: 2020-03-18
Payer: MEDICAID

## 2020-03-18 VITALS
SYSTOLIC BLOOD PRESSURE: 138 MMHG | DIASTOLIC BLOOD PRESSURE: 90 MMHG | TEMPERATURE: 98 F | WEIGHT: 271.19 LBS | HEART RATE: 80 BPM | HEIGHT: 67 IN | BODY MASS INDEX: 42.56 KG/M2

## 2020-03-18 DIAGNOSIS — R21 RASH: Primary | ICD-10-CM

## 2020-03-18 PROCEDURE — 99212 OFFICE O/P EST SF 10 MIN: CPT | Mod: S$PBB,,, | Performed by: FAMILY MEDICINE

## 2020-03-18 PROCEDURE — 99214 OFFICE O/P EST MOD 30 MIN: CPT | Mod: PBBFAC,PN | Performed by: FAMILY MEDICINE

## 2020-03-18 PROCEDURE — 99212 PR OFFICE/OUTPT VISIT, EST, LEVL II, 10-19 MIN: ICD-10-PCS | Mod: S$PBB,,, | Performed by: FAMILY MEDICINE

## 2020-03-18 PROCEDURE — 99999 PR PBB SHADOW E&M-EST. PATIENT-LVL IV: CPT | Mod: PBBFAC,,, | Performed by: FAMILY MEDICINE

## 2020-03-18 PROCEDURE — 99999 PR PBB SHADOW E&M-EST. PATIENT-LVL IV: ICD-10-PCS | Mod: PBBFAC,,, | Performed by: FAMILY MEDICINE

## 2020-03-18 RX ORDER — CHLORZOXAZONE 500 MG/1
500 TABLET ORAL DAILY
COMMUNITY
Start: 2020-02-05 | End: 2021-01-14

## 2020-03-18 RX ORDER — KETOCONAZOLE 20 MG/G
CREAM TOPICAL DAILY
Qty: 60 G | Refills: 1 | Status: SHIPPED | OUTPATIENT
Start: 2020-03-18 | End: 2020-12-15

## 2020-03-18 RX ORDER — MELOXICAM 15 MG/1
15 TABLET ORAL DAILY
COMMUNITY
Start: 2020-02-05 | End: 2021-03-02 | Stop reason: ALTCHOICE

## 2020-03-18 RX ORDER — TRIAMCINOLONE ACETONIDE 5 MG/G
CREAM TOPICAL 2 TIMES DAILY
Qty: 60 G | Refills: 1 | Status: SHIPPED | OUTPATIENT
Start: 2020-03-18 | End: 2022-08-30

## 2020-03-18 RX ORDER — HYDROCODONE BITARTRATE AND ACETAMINOPHEN 5; 325 MG/1; MG/1
TABLET ORAL
COMMUNITY
Start: 2020-03-15 | End: 2021-07-06 | Stop reason: ALTCHOICE

## 2020-03-18 NOTE — PROGRESS NOTES
"Subjective:       Patient ID: Rama Moy is a 44 y.o. female.    Chief Complaint: Rash (bilateral arms)    2 years ago had rash on right arm which went away. Three months ago rash returned on the right arm and she has been having similar itchy rashes on her left arm, left hand and left thigh. She went to the dermatologist last month and was prescribed clotrimazole-betamethasone cream which is not effective.    No known environmental tigers.    The following portions of the patient's history were reviewed and updated as appropriate: allergies, current medications, past medical history, and problem list.        Review of Systems   Constitutional: Negative for activity change, appetite change, chills, diaphoresis, fatigue, fever and unexpected weight change.   Musculoskeletal: Positive for arthralgias.   Skin: Positive for rash.   Hematological: Negative for adenopathy. Does not bruise/bleed easily.   Psychiatric/Behavioral: Negative for confusion.       Objective:       Vitals:    03/18/20 1109   BP: (!) 138/90   BP Location: Right arm   Pulse: 80   Temp: 98.1 °F (36.7 °C)   Weight: 123 kg (271 lb 2.7 oz)   Height: 5' 7" (1.702 m)     Physical Exam   Constitutional: She appears well-developed and well-nourished. No distress.   Pulmonary/Chest: Effort normal.   Skin: Skin is warm. Rash noted.   Right arm shows a round hyperpigmented macular lesion without ozzing, scaling or crusting.    Diffuse regions of grouped papules on the left arm, left dorsum of hand, flexor surface of right arm and left thigh.   Psychiatric: She has a normal mood and affect.                         Assessment:       1. Rash        Plan:       1. Rash  -     triamcinolone acetonide 0.5% (KENALOG) 0.5 % Crea; Apply topically 2 (two) times daily.  Dispense: 60 g; Refill: 1  -     ketoconazole (NIZORAL) 2 % cream; Apply topically once daily.  Dispense: 60 g; Refill: 1  -     Ambulatory referral/consult to Dermatology; Future; Expected " date: 03/25/2020

## 2020-03-19 ENCOUNTER — HOSPITAL ENCOUNTER (OUTPATIENT)
Dept: RADIOLOGY | Facility: OTHER | Age: 45
Discharge: HOME | End: 2020-03-19
Attending: OBSTETRICS & GYNECOLOGY
Payer: MEDICAID

## 2020-03-19 DIAGNOSIS — R92.8 ABNORMAL MAMMOGRAM: ICD-10-CM

## 2020-03-19 PROCEDURE — 77061 BREAST TOMOSYNTHESIS UNI: CPT | Mod: 26,LT,, | Performed by: RADIOLOGY

## 2020-03-19 PROCEDURE — 77065 MAMMO DIGITAL DIAGNOSTIC LEFT WITH TOMOSYNTHESIS_CAD: ICD-10-PCS | Mod: 26,LT,, | Performed by: RADIOLOGY

## 2020-03-19 PROCEDURE — 77061 MAMMO DIGITAL DIAGNOSTIC LEFT WITH TOMOSYNTHESIS_CAD: ICD-10-PCS | Mod: 26,LT,, | Performed by: RADIOLOGY

## 2020-03-19 PROCEDURE — 77061 BREAST TOMOSYNTHESIS UNI: CPT | Mod: TC,LT

## 2020-03-19 PROCEDURE — 77065 DX MAMMO INCL CAD UNI: CPT | Mod: 26,LT,, | Performed by: RADIOLOGY

## 2020-03-19 PROCEDURE — 77065 DX MAMMO INCL CAD UNI: CPT | Mod: TC,LT

## 2020-03-31 RX ORDER — ATORVASTATIN CALCIUM 40 MG/1
TABLET, FILM COATED ORAL
Qty: 90 TABLET | Refills: 2 | Status: SHIPPED | OUTPATIENT
Start: 2020-03-31 | End: 2021-01-21 | Stop reason: SDUPTHER

## 2020-03-31 NOTE — TELEPHONE ENCOUNTER
Spoke with patient, she confirmed that she is taking the atorvastatin, just not everyday.  I explained to her that she should be taking daily, patient verbalized understanding.

## 2020-04-02 ENCOUNTER — TELEPHONE (OUTPATIENT)
Dept: PRIMARY CARE CLINIC | Facility: CLINIC | Age: 45
End: 2020-04-02

## 2020-04-02 NOTE — TELEPHONE ENCOUNTER
Verify that verbal order is okay.  May substitute with losartan 25 mg 2 tablets to equate to 50 mg daily until they have stock available.

## 2020-04-02 NOTE — TELEPHONE ENCOUNTER
Spoke with Polina at [facility].  Anticipate 4-6 week backorder on Losartan 50 mg.  [facility] order accepted for Losartan 25 mg #60 - two daily with 1 add'l refill.  Rx to  be converted back to single 50 mg tab when available.

## 2020-04-02 NOTE — TELEPHONE ENCOUNTER
Fax rec'd from [facility].  Losartan 50 mg on backorder.  Pharm has Losartan 25 mg in stock.  Requesting replacement Rx.  LOV 3/18/20 rash.

## 2020-04-15 ENCOUNTER — PATIENT MESSAGE (OUTPATIENT)
Dept: PRIMARY CARE CLINIC | Facility: CLINIC | Age: 45
End: 2020-04-15

## 2020-05-27 RX ORDER — HYDROCHLOROTHIAZIDE 12.5 MG/1
CAPSULE ORAL
Qty: 30 CAPSULE | Refills: 0 | Status: SHIPPED | OUTPATIENT
Start: 2020-05-27 | End: 2020-11-23 | Stop reason: SDUPTHER

## 2020-06-05 ENCOUNTER — CLINICAL SUPPORT (OUTPATIENT)
Dept: PRIMARY CARE CLINIC | Facility: CLINIC | Age: 45
End: 2020-06-05
Payer: MEDICAID

## 2020-06-05 ENCOUNTER — PATIENT MESSAGE (OUTPATIENT)
Dept: PRIMARY CARE CLINIC | Facility: CLINIC | Age: 45
End: 2020-06-05
Payer: MEDICAID

## 2020-06-05 DIAGNOSIS — N30.00 ACUTE CYSTITIS WITHOUT HEMATURIA: Primary | ICD-10-CM

## 2020-06-05 LAB
AMORPH CRY UR QL COMP ASSIST: NORMAL
BACTERIA #/AREA URNS AUTO: NORMAL /HPF
BILIRUB SERPL-MCNC: 2 MG/DL
BILIRUB UR QL STRIP: NEGATIVE
BLOOD URINE, POC: 50
CLARITY UR REFRACT.AUTO: ABNORMAL
CLARITY, POC UA: NORMAL
COLOR UR AUTO: YELLOW
COLOR, POC UA: NORMAL
GLUCOSE UR QL STRIP: NEGATIVE
GLUCOSE UR QL STRIP: NORMAL
HGB UR QL STRIP: ABNORMAL
HYALINE CASTS UR QL AUTO: 0 /LPF
KETONES UR QL STRIP: NEGATIVE
KETONES UR QL STRIP: NORMAL
LEUKOCYTE ESTERASE UR QL STRIP: NEGATIVE
LEUKOCYTE ESTERASE URINE, POC: NORMAL
MICROSCOPIC COMMENT: NORMAL
NITRITE UR QL STRIP: NEGATIVE
NITRITE, POC UA: NORMAL
PH UR STRIP: 6 [PH] (ref 5–8)
PH, POC UA: 5
PROT UR QL STRIP: ABNORMAL
PROTEIN, POC: 30
RBC #/AREA URNS AUTO: 1 /HPF (ref 0–4)
SP GR UR STRIP: 1.03 (ref 1–1.03)
SPECIFIC GRAVITY, POC UA: 1.03
URN SPEC COLLECT METH UR: ABNORMAL
UROBILINOGEN, POC UA: 1
WBC #/AREA URNS AUTO: 0 /HPF (ref 0–5)

## 2020-06-05 PROCEDURE — 81002 URINALYSIS NONAUTO W/O SCOPE: CPT | Mod: PBBFAC,PN

## 2020-06-05 PROCEDURE — 81001 URINALYSIS AUTO W/SCOPE: CPT

## 2020-06-05 RX ORDER — CEPHALEXIN 500 MG/1
500 CAPSULE ORAL EVERY 12 HOURS
Qty: 10 CAPSULE | Refills: 0 | Status: SHIPPED | OUTPATIENT
Start: 2020-06-05 | End: 2020-06-10

## 2020-06-05 NOTE — TELEPHONE ENCOUNTER
Patient of Dr. Shah was requesting antibiotic for possible bladder infection.  I told her no, she needed to come in and give a urine sample.  Approval for ua with reflex to culture after I do urine dip.  LOV with you on 3/18/2020

## 2020-06-08 NOTE — TELEPHONE ENCOUNTER
Hello.     Your urine test did NOT show infection, but it does show a little microscopic blood. Are you on your period? Please come in to see me or set up a VIRTUAL TELEMEDICINE visit so we can discuss your symptoms further

## 2020-06-10 ENCOUNTER — OFFICE VISIT (OUTPATIENT)
Dept: PRIMARY CARE CLINIC | Facility: CLINIC | Age: 45
End: 2020-06-10
Payer: MEDICAID

## 2020-06-10 DIAGNOSIS — I10 HTN (HYPERTENSION), BENIGN: ICD-10-CM

## 2020-06-10 DIAGNOSIS — Z96.89 SPINAL CORD STIMULATOR STATUS: ICD-10-CM

## 2020-06-10 DIAGNOSIS — N30.01 ACUTE CYSTITIS WITH HEMATURIA: Primary | ICD-10-CM

## 2020-06-10 DIAGNOSIS — R10.9 FLANK PAIN: ICD-10-CM

## 2020-06-10 PROCEDURE — 99212 OFFICE O/P EST SF 10 MIN: CPT | Mod: 95,,, | Performed by: FAMILY MEDICINE

## 2020-06-10 PROCEDURE — 99212 PR OFFICE/OUTPT VISIT, EST, LEVL II, 10-19 MIN: ICD-10-PCS | Mod: 95,,, | Performed by: FAMILY MEDICINE

## 2020-06-10 NOTE — PROGRESS NOTES
Subjective:      Patient ID: Rama Moy is a 44 y.o. female.    Chief Complaint: No chief complaint on file.    The patient location is: home  The chief complaint leading to consultation is: R flank pain    Visit type: audiovisual    Face to Face time with patient: 5 min  7 minutes of total time spent on the encounter, which includes face to face time and non-face to face time preparing to see the patient (eg, review of tests), Obtaining and/or reviewing separately obtained history, Documenting clinical information in the electronic or other health record, Independently interpreting results (not separately reported) and communicating results to the patient/family/caregiver, or Care coordination (not separately reported).         Each patient to whom he or she provides medical services by telemedicine is:  (1) informed of the relationship between the physician and patient and the respective role of any other health care provider with respect to management of the patient; and (2) notified that he or she may decline to receive medical services by telemedicine and may withdraw from such care at any time.    Notes:     3d ago with sharp pains on R side & back. No dysuria. No hx hidney stone. No pain now after taking Cephalexin for the past few days. Not on menses.     Your urine test did NOT show infection, but it does show a little microscopic blood.     She does have spinal cord stimulator for L knee pain (Dr Kessler)    Current Outpatient Medications:     atorvastatin (LIPITOR) 40 MG tablet, TAKE 1 TABLET BY MOUTH ONCE DAILY, Disp: 90 tablet, Rfl: 2    cephALEXin (KEFLEX) 500 MG capsule, Take 1 capsule (500 mg total) by mouth every 12 (twelve) hours. for 5 days, Disp: 10 capsule, Rfl: 0    chlorzoxazone (PARAFON FORTE) 500 mg Tab, Take 500 mg by mouth once daily., Disp: , Rfl:     fluticasone propionate (FLONASE) 50 mcg/actuation nasal spray, 1 spray (50 mcg total) by Each Nostril route 2 (two) times daily  as needed for Rhinitis., Disp: 15 g, Rfl: 0    hydroCHLOROthiazide (MICROZIDE) 12.5 mg capsule, TAKE ONE CAPSULE BY MOUTH ONCE DAILY, Disp: 30 capsule, Rfl: 0    HYDROcodone-acetaminophen (NORCO) 5-325 mg per tablet, TK 1 T PO ONCE A DAY PRN, Disp: , Rfl:     ketoconazole (NIZORAL) 2 % cream, Apply topically once daily., Disp: 60 g, Rfl: 1    losartan (COZAAR) 50 MG tablet, TAKE 1 TABLET BY MOUTH EVERY DAY, Disp: 90 tablet, Rfl: 3    meloxicam (MOBIC) 15 MG tablet, Take 15 mg by mouth once daily., Disp: , Rfl:     ondansetron (ZOFRAN-ODT) 4 MG TbDL, Take 1 tablet (4 mg total) by mouth every 6 (six) hours as needed., Disp: 20 tablet, Rfl: 0    pantoprazole (PROTONIX) 40 MG tablet, TAKE 1 TABLET BY MOUTH ONCE DAILY, Disp: 90 tablet, Rfl: 3    pregabalin (LYRICA) 100 MG capsule, Take 100 mg by mouth 2 (two) times daily., Disp: , Rfl:     trazodone (DESYREL) 50 MG tablet, Take 1 tablet (50 mg total) by mouth every evening., Disp: 30 tablet, Rfl: 11    triamcinolone acetonide 0.5% (KENALOG) 0.5 % Crea, Apply topically 2 (two) times daily., Disp: 60 g, Rfl: 1      Past Medical History:   Diagnosis Date    Constipation     GI Dr Reece 2014    Gastroesophageal reflux disease without esophagitis 8/28/2018    She states she had EGD in past    Grief 09/19/2017    mom passed 2017    HTN (hypertension), benign 3/10/2015    Knee pain, left 2012    Dr Reyes, injury at work    Mixed hyperlipidemia 8/29/2013    Morbid obesity 2/23/2015    Spinal cord stimulator status 08/28/2018    In back for L knee pain, Dr Kessler surgery & pain mgmt, Lyrica or Hydrocodone at night    Thyroid disease      Past Surgical History:   Procedure Laterality Date    DILATION AND CURETTAGE OF UTERUS      KNEE ARTHROSCOPY      L, Dasa    KNEE SURGERY      SKIN GRAFT      left finger due to injury    SPINAL CORD STIMULATOR IMPLANT      to L knee, Dr Kessler    TUBAL LIGATION       Social History     Social History Narrative     Surveillance, , 1 child, nonsmoker, nondrinker, GYN White     Family History   Problem Relation Age of Onset    Hypertension Mother     Diabetes Mother     COPD Mother     Breast cancer Neg Hx     Colon cancer Neg Hx     Ovarian cancer Neg Hx      There were no vitals filed for this visit.  Objective:   Physical Exam  Assessment:     1. Acute cystitis with hematuria    2. Flank pain    3. Spinal cord stimulator status    4. HTN (hypertension), benign      Plan:        Her symptoms have resolved with antibiotics, although urinalysis did not show acute infection, it only showed small amount of blood.  She is no longer having flank pain.  Increase fluids.  Let me know if symptoms recur    Follow up yearly with fasting labs prior  There are no Patient Instructions on file for this visit.                            Answers for HPI/ROS submitted by the patient on 6/9/2020   Abdominal pain  Chronicity: new  Onset: in the past 7 days  Onset quality: sudden  Frequency: rarely  Episode duration: 2 days  Progression since onset: unchanged  Pain - numeric: 4/10  Pain quality: sharp  anorexia: No  arthralgias: No  belching: No  constipation: Yes  diarrhea: No  dysuria: No  fever: No  flatus: No  frequency: No  headaches: No  hematochezia: No  hematuria: No  melena: No  myalgias: No  nausea: No  weight loss: No  vomiting: No  Aggravated by: being still  Relieved by: certain positions  Pain severity: mild  Treatments tried: antibiotics  Improvement on treatment: significant  GERD: Yes  UTI: Yes

## 2020-11-16 RX ORDER — LOSARTAN POTASSIUM 50 MG/1
50 TABLET ORAL DAILY
Qty: 90 TABLET | Refills: 3 | Status: SHIPPED | OUTPATIENT
Start: 2020-11-16 | End: 2021-01-14 | Stop reason: DRUGHIGH

## 2020-11-23 ENCOUNTER — CLINICAL SUPPORT (OUTPATIENT)
Dept: PRIMARY CARE CLINIC | Facility: CLINIC | Age: 45
End: 2020-11-23
Payer: MEDICAID

## 2020-11-23 ENCOUNTER — TELEPHONE (OUTPATIENT)
Dept: PRIMARY CARE CLINIC | Facility: CLINIC | Age: 45
End: 2020-11-23

## 2020-11-23 VITALS — SYSTOLIC BLOOD PRESSURE: 132 MMHG | DIASTOLIC BLOOD PRESSURE: 80 MMHG

## 2020-11-23 DIAGNOSIS — K21.9 GASTROESOPHAGEAL REFLUX DISEASE WITHOUT ESOPHAGITIS: Primary | ICD-10-CM

## 2020-11-23 DIAGNOSIS — I10 HTN (HYPERTENSION), BENIGN: ICD-10-CM

## 2020-11-23 PROCEDURE — 99211 OFF/OP EST MAY X REQ PHY/QHP: CPT | Mod: PBBFAC,PN

## 2020-11-23 PROCEDURE — 99999 PR PBB SHADOW E&M-EST. PATIENT-LVL I: ICD-10-PCS | Mod: PBBFAC,,,

## 2020-11-23 PROCEDURE — 99999 PR PBB SHADOW E&M-EST. PATIENT-LVL I: CPT | Mod: PBBFAC,,,

## 2020-11-23 RX ORDER — ASPIRIN 81 MG/1
81 TABLET ORAL DAILY
COMMUNITY
End: 2021-12-16

## 2020-11-23 NOTE — TELEPHONE ENCOUNTER
----- Message from Minna Rubalcava MA sent at 11/23/2020 11:06 AM CST -----  Contact: pt   [phone]  Patient is returning a phone call.  Who left a message for the patient:   Does patient know what this is regarding:    Comments: Pt is not sure if she need to come in for an appt?  Please call to advise the patient.

## 2020-11-23 NOTE — TELEPHONE ENCOUNTER
Spoke with patient, informed provider wanted her to come in for BP check.  Appointment scheduled today with Nurse.

## 2020-11-23 NOTE — PROGRESS NOTES
Patient present in clinic today for a Nurse BP Check. Patient advised that she feels well.   Medications & Allergy List reviewed.    Initial BP Reading in Right Arm - 132/80  Initial BP Reading in Left Arm - 136/82    Encounter being routed to Dr. Shah for review.  Advised the patient that they will be contacted regarding any medication changes or if further appointments are required.

## 2020-11-24 RX ORDER — HYDROCHLOROTHIAZIDE 12.5 MG/1
12.5 CAPSULE ORAL DAILY
Qty: 90 CAPSULE | Refills: 0 | Status: SHIPPED | OUTPATIENT
Start: 2020-11-24 | End: 2021-03-16 | Stop reason: DRUGHIGH

## 2020-11-24 RX ORDER — PANTOPRAZOLE SODIUM 40 MG/1
40 TABLET, DELAYED RELEASE ORAL DAILY
Qty: 90 TABLET | Refills: 0 | Status: SHIPPED | OUTPATIENT
Start: 2020-11-24 | End: 2021-01-21 | Stop reason: SDUPTHER

## 2020-12-11 ENCOUNTER — PATIENT MESSAGE (OUTPATIENT)
Dept: OTHER | Facility: OTHER | Age: 45
End: 2020-12-11

## 2020-12-15 DIAGNOSIS — R21 RASH: ICD-10-CM

## 2020-12-15 RX ORDER — KETOCONAZOLE 20 MG/G
CREAM TOPICAL
Qty: 60 G | Refills: 3 | Status: SHIPPED | OUTPATIENT
Start: 2020-12-15 | End: 2021-04-05 | Stop reason: ALTCHOICE

## 2020-12-23 ENCOUNTER — PATIENT MESSAGE (OUTPATIENT)
Dept: ADMINISTRATIVE | Facility: OTHER | Age: 45
End: 2020-12-23

## 2020-12-29 ENCOUNTER — OFFICE VISIT (OUTPATIENT)
Dept: PRIMARY CARE CLINIC | Facility: CLINIC | Age: 45
End: 2020-12-29
Payer: MEDICAID

## 2020-12-29 VITALS
DIASTOLIC BLOOD PRESSURE: 88 MMHG | WEIGHT: 274.5 LBS | BODY MASS INDEX: 43.08 KG/M2 | HEART RATE: 101 BPM | SYSTOLIC BLOOD PRESSURE: 136 MMHG | OXYGEN SATURATION: 95 % | HEIGHT: 67 IN

## 2020-12-29 DIAGNOSIS — Z96.89 SPINAL CORD STIMULATOR STATUS: ICD-10-CM

## 2020-12-29 DIAGNOSIS — I10 HTN (HYPERTENSION), BENIGN: ICD-10-CM

## 2020-12-29 DIAGNOSIS — R22.9 LOCALIZED SOFT TISSUE SWELLING: Primary | ICD-10-CM

## 2020-12-29 PROCEDURE — 99999 PR PBB SHADOW E&M-EST. PATIENT-LVL IV: ICD-10-PCS | Mod: PBBFAC,,, | Performed by: FAMILY MEDICINE

## 2020-12-29 PROCEDURE — 99999 PR PBB SHADOW E&M-EST. PATIENT-LVL IV: CPT | Mod: PBBFAC,,, | Performed by: FAMILY MEDICINE

## 2020-12-29 PROCEDURE — 99214 PR OFFICE/OUTPT VISIT, EST, LEVL IV, 30-39 MIN: ICD-10-PCS | Mod: S$PBB,,, | Performed by: FAMILY MEDICINE

## 2020-12-29 PROCEDURE — 99214 OFFICE O/P EST MOD 30 MIN: CPT | Mod: PBBFAC,PN | Performed by: FAMILY MEDICINE

## 2020-12-29 PROCEDURE — 99214 OFFICE O/P EST MOD 30 MIN: CPT | Mod: S$PBB,,, | Performed by: FAMILY MEDICINE

## 2020-12-29 NOTE — PROGRESS NOTES
Subjective:      Patient ID: Rama Moy is a 45 y.o. female.    Chief Complaint: Hip Pain (left hip, with swelling)    Here today for nontender, no pain swelling above her L hip in the soft tissue over the past month.  She was wondering if it could be fluid.  Denies any trauma..  Does not hurt to press on the area.  She just notices the lump when she is lying on her left side    She has spinal cord stimulator for L chronic L knee pain. Dr Kessler did this procedure. She takes Lyrica 100mg & mobic. She has chornic L knee swelling    Just started digital HTN yesterday. Home Bp 140-160.  She takes losartan 50 and hydrochlorothiazide 12.5 daily.  Denies chest pain shortness of breath      Current Outpatient Medications:     aspirin (ECOTRIN) 81 MG EC tablet, Take 81 mg by mouth once daily., Disp: , Rfl:     atorvastatin (LIPITOR) 40 MG tablet, TAKE 1 TABLET BY MOUTH ONCE DAILY, Disp: 90 tablet, Rfl: 2    blood pressure monitor ([employer]HE[employer] EASE) XL arm size (OP), by Other route as needed for High Blood Pressure., Disp: 1 each, Rfl: 0    chlorzoxazone (PARAFON FORTE) 500 mg Tab, Take 500 mg by mouth once daily., Disp: , Rfl:     fluticasone propionate (FLONASE) 50 mcg/actuation nasal spray, 1 spray (50 mcg total) by Each Nostril route 2 (two) times daily as needed for Rhinitis., Disp: 15 g, Rfl: 0    hydroCHLOROthiazide (MICROZIDE) 12.5 mg capsule, Take 1 capsule (12.5 mg total) by mouth once daily., Disp: 90 capsule, Rfl: 0    HYDROcodone-acetaminophen (NORCO) 5-325 mg per tablet, TK 1 T PO ONCE A DAY PRN, Disp: , Rfl:     ketoconazole (NIZORAL) 2 % cream, APPLY TOPICALLY TO THE AFFECTED AREA ONCE DAILY, Disp: 60 g, Rfl: 3    losartan (COZAAR) 50 MG tablet, Take 1 tablet (50 mg total) by mouth once daily., Disp: 90 tablet, Rfl: 3    meloxicam (MOBIC) 15 MG tablet, Take 15 mg by mouth once daily., Disp: , Rfl:     ondansetron (ZOFRAN-ODT) 4 MG TbDL, Take 1 tablet (4 mg total) by mouth every 6 (six) hours as  needed., Disp: 20 tablet, Rfl: 0    pantoprazole (PROTONIX) 40 MG tablet, Take 1 tablet (40 mg total) by mouth once daily., Disp: 90 tablet, Rfl: 0    pregabalin (LYRICA) 100 MG capsule, Take 100 mg by mouth 2 (two) times daily., Disp: , Rfl:     triamcinolone acetonide 0.5% (KENALOG) 0.5 % Crea, Apply topically 2 (two) times daily., Disp: 60 g, Rfl: 1    trazodone (DESYREL) 50 MG tablet, Take 1 tablet (50 mg total) by mouth every evening., Disp: 30 tablet, Rfl: 11    Lab Results   Component Value Date    HGBA1C 5.4 05/06/2015    HGBA1C 5.3 08/01/2012     No results found for: MICALBCREAT  Lab Results   Component Value Date    LDLCALC 234.4 (H) 08/20/2018    LDLCALC 190.0 (H) 10/17/2016    CHOL 294 (H) 08/20/2018    HDL 40 08/20/2018    TRIG 98 08/20/2018       Lab Results   Component Value Date     09/12/2018    K 3.5 09/12/2018     09/12/2018    CO2 27 09/12/2018    GLU 72 09/12/2018    BUN 11 09/12/2018    CREATININE 1.0 09/12/2018    CALCIUM 9.0 09/12/2018    PROT 7.5 09/12/2018    ALBUMIN 3.4 (L) 09/12/2018    BILITOT 0.5 09/12/2018    ALKPHOS 74 09/12/2018    AST 26 09/12/2018    ALT 16 09/12/2018    ANIONGAP 6 (L) 09/12/2018    ESTGFRAFRICA >60.0 09/12/2018    EGFRNONAA >60.0 09/12/2018    WBC 9.05 09/12/2018    HGB 12.0 09/12/2018    HGB 11.8 (L) 08/20/2018    HCT 37.8 09/12/2018    MCV 89 09/12/2018     (H) 09/12/2018    TSH 2.352 02/14/2020       No results found for: LH, FSH, TOTALTESTOST, PROGESTERONE, ESTRADIOL, DFEFQOJE20NC, [id number], FERRITIN, IRON, TRANSFERRIN, TIBC, FESATURATED, ZINC      Past Medical History:   Diagnosis Date    Constipation     GI Dr Reece 2014    Gastroesophageal reflux disease without esophagitis 8/28/2018    She states she had EGD in past    Grief 09/19/2017    mom passed 2017    HTN (hypertension), benign 03/10/2015    Digital med 12/20    Knee pain, left 2012    scope, Dr Reyes, injury at work, treadmill helps with stiffness    Mixed  "hyperlipidemia 8/29/2013    Morbid obesity 2/23/2015    Spinal cord stimulator status 08/28/2018    In back for L knee pain, Dr Kessler surgery & pain mgmt, Lyrica or Hydrocodone at night    Thyroid disease      Past Surgical History:   Procedure Laterality Date    DILATION AND CURETTAGE OF UTERUS      KNEE ARTHROSCOPY      L, Dasa    KNEE SURGERY      SKIN GRAFT      left finger due to injury    SPINAL CORD STIMULATOR IMPLANT      to L knee, Dr Kessler    TUBAL LIGATION       Social History     Social History Narrative    Surveillance, , 1 child, nonsmoker, nondrinker, GYN White     Family History   Problem Relation Age of Onset    Hypertension Mother     Diabetes Mother     COPD Mother     Breast cancer Neg Hx     Colon cancer Neg Hx     Ovarian cancer Neg Hx      Vitals:    12/29/20 1104 12/29/20 1123   BP: (!) 126/100 136/88   Pulse: 101    SpO2: 95%    Weight: 124.5 kg (274 lb 7.6 oz)    Height: 5' 7" (1.702 m)    PainSc:   3      Objective:   Physical Exam  Musculoskeletal:      Comments: Left knee swelling & tenderness with pressure on patella, mild crepitance, some pain with walking along the joint line,  2+ pulses, less than 2 second capillary refill    L hip full range of motion, nontender trochanteric bursa, soft lump palpated above L iliac crest in the soft tissue, nontender, no skin changes           Assessment:     1. Localized soft tissue swelling    2. Spinal cord stimulator status    3. HTN (hypertension), benign      Plan:     Orders Placed This Encounter    US Abdomen Limited     I will email results    Continue with Digital med    ==============================================  FOR HIGH BLOOD PRESSURE (HYPERTENSION)-------------    Take your medication every day     Try to stop these things that can elevate blood pressure: salt, caffeine, energy drinks, diet pills, sudafed, alcohol , taking NSAIDS daily (advil, alleve, ibuprofen, naproxen) and birth control    DECREASE ALCOHOL " CONSUMPTION    DECREASE SALT (fast foods, frozen, canned, processed foods, ham, turkey, fried foods, chips, crackers, etc) & drink 8 glasses of water a day with minimal caffeine ( 1 cup a day)   ==============================================      Has PHYSICAL & LABS with me next month    There are no Patient Instructions on file for this visit.

## 2020-12-30 ENCOUNTER — PATIENT MESSAGE (OUTPATIENT)
Dept: OTHER | Facility: OTHER | Age: 45
End: 2020-12-30

## 2020-12-31 ENCOUNTER — HOSPITAL ENCOUNTER (OUTPATIENT)
Dept: RADIOLOGY | Facility: OTHER | Age: 45
Discharge: HOME | End: 2020-12-31
Attending: FAMILY MEDICINE
Payer: MEDICAID

## 2020-12-31 ENCOUNTER — PATIENT OUTREACH (OUTPATIENT)
Dept: OTHER | Facility: OTHER | Age: 45
End: 2020-12-31

## 2020-12-31 DIAGNOSIS — R22.9 LOCALIZED SOFT TISSUE SWELLING: ICD-10-CM

## 2020-12-31 PROCEDURE — 76705 ECHO EXAM OF ABDOMEN: CPT | Mod: TC

## 2020-12-31 PROCEDURE — 76705 ECHO EXAM OF ABDOMEN: CPT | Mod: 26,,, | Performed by: RADIOLOGY

## 2020-12-31 PROCEDURE — 76705 US ABDOMEN LIMITED: ICD-10-PCS | Mod: 26,,, | Performed by: RADIOLOGY

## 2020-12-31 NOTE — PROGRESS NOTES
Digital Medicine: Health  Introduction    Introduced Rama Moy to Digital Medicine. Discussed health  role and recommended lifestyle modifications.    The history is provided by the patient.           Additional Enrollment Details: Reviewed details of digital coaching and explained automated text messages.      HYPERTENSION  Explained hypertension digital medicine goals including BP goal less than or equal to 130/80mmHg, improved convenience of BP management and reduced risk of heart attack, kidney failure, stroke, eye disease, dementia, and death.      Explained non-pharmacologic therapies like low salt diet and physical activity can reduce blood pressure. .      Explained that we expect patient to submit several blood pressure readings per week at random times of the day, but at least 30 minutes after taking blood pressure medications. Instructed patient not to allow anyone else to use their blood pressure monitor and phone as data submitted is directly entered into medical record. Reviewed and confirmed appropriate blood pressure monitoring technique.         Patient's BP goal is less than or equal to 130/80.Patient's BP average is 139/86 mmHg, which is above goal, per 2017 ACC/AHA Hypertension Guidelines.              Diet-Assessed        Intervention(s): portion control, reducing processed foods and DASH diet/sodium reduction education  Additional diet details: Encouraged limiting sodium intake and discussed foods high in sodium. Encouraged the use of salt free seasonings and gave examples.     Physical Activity-Assessed      Intervention(s): created new goal         Additional physical activity details: Encouraged 150 minutes of physical activity weekly (30 minutes five days a week).     Patient states she has knee problems and can only walk for limited amounts at a time. Discussed breaking up activity into intervals, and other exercises she can do without putting too much pressure on her  knees.      Medication Adherence-Medication Adherence not addressed.      Substance, Sleep, Stress-Not assessed      Continue current diet/physical activity routine.  Provided patient education.  Reviewed Device Techniques.     Addressed patient questions and patient has my contact information if needed prior to next outreach. Patient verbalizes understanding.      Explained the importance of self-monitoring and medication adherence. Encouraged the patient to communicate with their health  for lifestyle modifications to help improve or maintain a healthy lifestyle.               There are no preventive care reminders to display for this patient.      Last 5 Patient Entered Readings                                      Current 30 Day Average: 139/86     Recent Readings 12/30/2020 12/29/2020 12/29/2020 12/28/2020    SBP (mmHg) 129 140 150 148    DBP (mmHg) 84 83 87 91    Pulse 91 83 77 87

## 2020-12-31 NOTE — LETTER
December 31, 2020     Rama Moy  [address] [facility] [address]       Dear Rama,    Welcome to [facility]! Our goal is to make care effective, proactive and convenient by using data you send us from home to better treat your chronic conditions.                My name is Sergey Chakraborty, and I am your dedicated Digital Medicine clinician. As an expert in medication management, I will help ensure that the medications you are taking continue to provide the intended benefits and help you reach your goals. You can reach me directly at [phone] or by sending me a message directly through your [facility] account.      I am Yocasta Torres and I will be your health . My job is to help you identify lifestyle changes to improve your disease control. We will talk about nutrition, exercise, and other ways you may be able to adjust your current habits to better your health. Additionally, we will help ensure you are completing the tests and screenings that are necessary to help manage your conditions. You can reach me directly at [phone] or by sending me a message directly through your [facility] account.    Most importantly, YOU are at the center of this team. Together, we will work to improve your overall health and encourage you to meet your goals for a healthier lifestyle.     What we expect from YOU:  · Please take frequent home blood pressure measurements. We ask that you take at least 1 blood pressure reading per week, but more information will better help us get you know you. Be sure you rest for a few minutes before taking the reading in a quiet, comfortable place.     Be available to receive phone calls or [employer] messages, when appropriate, from your care team. Please let us know if there are any specific days or times that work best for us to reach you via phone.     Complete routine tests and screenings. Dont worry, we will help keep you on track!            What you should expect from your Digital Medicine Care Team:   We will work with you to create a personalized plan of care and provide you with encouragement and education, including regarding lifestyle changes, that could help you manage your disease states.     We will adjust your current medications, if needed, and continue to monitor your long-term progress.     We will provide you and your physician with monthly progress reports after you have been in the program for more than 30 days.     We will send you reminders through brand eins [employer] and text messages to help ensure you do not miss any testing deadlines to help manage your disease states.    You will be able to reach us by phone or through your [facility] account by clicking our names under Care Team on the right side of the home screen.    We look forward to working with you to help manage your health,    Sincerely,    Your Digital Medicine Team    Please visit our websites to learn more:   · Hypertension: [email or web address]      Remember, we are not available for emergencies. If you have an emergency, please contact your doctors office directly or call [facility] on-call ([phone] or [phone]) or [phone].

## 2021-01-13 ENCOUNTER — TELEPHONE (OUTPATIENT)
Dept: PRIMARY CARE CLINIC | Facility: CLINIC | Age: 46
End: 2021-01-13

## 2021-01-13 DIAGNOSIS — Z00.00 ROUTINE GENERAL MEDICAL EXAMINATION AT A HEALTH CARE FACILITY: Primary | ICD-10-CM

## 2021-01-14 ENCOUNTER — LAB VISIT (OUTPATIENT)
Dept: LAB | Facility: HOSPITAL | Age: 46
End: 2021-01-14
Attending: FAMILY MEDICINE
Payer: MEDICAID

## 2021-01-14 ENCOUNTER — PATIENT MESSAGE (OUTPATIENT)
Dept: ADMINISTRATIVE | Facility: OTHER | Age: 46
End: 2021-01-14

## 2021-01-14 DIAGNOSIS — Z00.00 ROUTINE GENERAL MEDICAL EXAMINATION AT A HEALTH CARE FACILITY: ICD-10-CM

## 2021-01-14 LAB
BASOPHILS # BLD AUTO: 0.05 K/UL (ref 0–0.2)
BASOPHILS NFR BLD: 0.7 % (ref 0–1.9)
DIFFERENTIAL METHOD: ABNORMAL
EOSINOPHIL # BLD AUTO: 0.1 K/UL (ref 0–0.5)
EOSINOPHIL NFR BLD: 1.6 % (ref 0–8)
ERYTHROCYTE [DISTWIDTH] IN BLOOD BY AUTOMATED COUNT: 14.1 % (ref 11.5–14.5)
HCT VFR BLD AUTO: 37.9 % (ref 37–48.5)
HGB BLD-MCNC: 11.4 G/DL (ref 12–16)
IMM GRANULOCYTES # BLD AUTO: 0.01 K/UL (ref 0–0.04)
IMM GRANULOCYTES NFR BLD AUTO: 0.1 % (ref 0–0.5)
LYMPHOCYTES # BLD AUTO: 2.7 K/UL (ref 1–4.8)
LYMPHOCYTES NFR BLD: 39.1 % (ref 18–48)
MCH RBC QN AUTO: 27.5 PG (ref 27–31)
MCHC RBC AUTO-ENTMCNC: 30.1 G/DL (ref 32–36)
MCV RBC AUTO: 92 FL (ref 82–98)
MONOCYTES # BLD AUTO: 0.6 K/UL (ref 0.3–1)
MONOCYTES NFR BLD: 8.8 % (ref 4–15)
NEUTROPHILS # BLD AUTO: 3.4 K/UL (ref 1.8–7.7)
NEUTROPHILS NFR BLD: 49.7 % (ref 38–73)
NRBC BLD-RTO: 0 /100 WBC
PLATELET # BLD AUTO: 426 K/UL (ref 150–350)
PMV BLD AUTO: 11.1 FL (ref 9.2–12.9)
RBC # BLD AUTO: 4.14 M/UL (ref 4–5.4)
WBC # BLD AUTO: 6.91 K/UL (ref 3.9–12.7)

## 2021-01-14 PROCEDURE — 85025 COMPLETE CBC W/AUTO DIFF WBC: CPT

## 2021-01-14 PROCEDURE — 80061 LIPID PANEL: CPT

## 2021-01-14 PROCEDURE — 80053 COMPREHEN METABOLIC PANEL: CPT

## 2021-01-14 PROCEDURE — 36415 COLL VENOUS BLD VENIPUNCTURE: CPT | Mod: PN

## 2021-01-14 PROCEDURE — 86803 HEPATITIS C AB TEST: CPT

## 2021-01-15 LAB
ALBUMIN SERPL BCP-MCNC: 3.4 G/DL (ref 3.5–5.2)
ALP SERPL-CCNC: 82 U/L (ref 55–135)
ALT SERPL W/O P-5'-P-CCNC: 8 U/L (ref 10–44)
ANION GAP SERPL CALC-SCNC: 11 MMOL/L (ref 8–16)
AST SERPL-CCNC: 13 U/L (ref 10–40)
BILIRUB SERPL-MCNC: 0.5 MG/DL (ref 0.1–1)
BUN SERPL-MCNC: 12 MG/DL (ref 6–20)
CALCIUM SERPL-MCNC: 8.3 MG/DL (ref 8.7–10.5)
CHLORIDE SERPL-SCNC: 107 MMOL/L (ref 95–110)
CHOLEST SERPL-MCNC: 274 MG/DL (ref 120–199)
CHOLEST/HDLC SERPL: 6 {RATIO} (ref 2–5)
CO2 SERPL-SCNC: 21 MMOL/L (ref 23–29)
CREAT SERPL-MCNC: 0.8 MG/DL (ref 0.5–1.4)
EST. GFR  (AFRICAN AMERICAN): >60 ML/MIN/1.73 M^2
EST. GFR  (NON AFRICAN AMERICAN): >60 ML/MIN/1.73 M^2
GLUCOSE SERPL-MCNC: 57 MG/DL (ref 70–110)
HCV AB SERPL QL IA: NEGATIVE
HDLC SERPL-MCNC: 46 MG/DL (ref 40–75)
HDLC SERPL: 16.8 % (ref 20–50)
LDLC SERPL CALC-MCNC: 211 MG/DL (ref 63–159)
NONHDLC SERPL-MCNC: 228 MG/DL
POTASSIUM SERPL-SCNC: 3.9 MMOL/L (ref 3.5–5.1)
PROT SERPL-MCNC: 7.1 G/DL (ref 6–8.4)
SODIUM SERPL-SCNC: 139 MMOL/L (ref 136–145)
TRIGL SERPL-MCNC: 85 MG/DL (ref 30–150)

## 2021-01-17 ENCOUNTER — PATIENT MESSAGE (OUTPATIENT)
Dept: OTHER | Facility: OTHER | Age: 46
End: 2021-01-17

## 2021-01-21 ENCOUNTER — OFFICE VISIT (OUTPATIENT)
Dept: PRIMARY CARE CLINIC | Facility: CLINIC | Age: 46
End: 2021-01-21
Payer: MEDICAID

## 2021-01-21 VITALS
DIASTOLIC BLOOD PRESSURE: 70 MMHG | SYSTOLIC BLOOD PRESSURE: 124 MMHG | OXYGEN SATURATION: 98 % | HEIGHT: 67 IN | HEART RATE: 100 BPM | WEIGHT: 270.94 LBS | BODY MASS INDEX: 42.53 KG/M2

## 2021-01-21 DIAGNOSIS — I10 HTN (HYPERTENSION), BENIGN: ICD-10-CM

## 2021-01-21 DIAGNOSIS — K21.9 GASTROESOPHAGEAL REFLUX DISEASE WITHOUT ESOPHAGITIS: ICD-10-CM

## 2021-01-21 DIAGNOSIS — Z00.00 ROUTINE GENERAL MEDICAL EXAMINATION AT A HEALTH CARE FACILITY: Primary | ICD-10-CM

## 2021-01-21 DIAGNOSIS — Z96.89 SPINAL CORD STIMULATOR STATUS: ICD-10-CM

## 2021-01-21 DIAGNOSIS — E66.01 CLASS 3 SEVERE OBESITY DUE TO EXCESS CALORIES WITH SERIOUS COMORBIDITY AND BODY MASS INDEX (BMI) OF 40.0 TO 44.9 IN ADULT: ICD-10-CM

## 2021-01-21 DIAGNOSIS — Z12.31 OTHER SCREENING MAMMOGRAM: ICD-10-CM

## 2021-01-21 DIAGNOSIS — E78.2 MIXED HYPERLIPIDEMIA: ICD-10-CM

## 2021-01-21 DIAGNOSIS — M17.11 PRIMARY LOCALIZED OSTEOARTHROSIS OF RIGHT LOWER LEG: ICD-10-CM

## 2021-01-21 PROCEDURE — 99999 PR PBB SHADOW E&M-EST. PATIENT-LVL V: CPT | Mod: PBBFAC,,, | Performed by: FAMILY MEDICINE

## 2021-01-21 PROCEDURE — 99396 PREV VISIT EST AGE 40-64: CPT | Mod: S$PBB,,, | Performed by: FAMILY MEDICINE

## 2021-01-21 PROCEDURE — 99215 OFFICE O/P EST HI 40 MIN: CPT | Mod: PBBFAC,PN | Performed by: FAMILY MEDICINE

## 2021-01-21 PROCEDURE — 99999 PR PBB SHADOW E&M-EST. PATIENT-LVL V: ICD-10-PCS | Mod: PBBFAC,,, | Performed by: FAMILY MEDICINE

## 2021-01-21 PROCEDURE — 99396 PR PREVENTIVE VISIT,EST,40-64: ICD-10-PCS | Mod: S$PBB,,, | Performed by: FAMILY MEDICINE

## 2021-01-21 RX ORDER — PANTOPRAZOLE SODIUM 40 MG/1
40 TABLET, DELAYED RELEASE ORAL DAILY
Qty: 90 TABLET | Refills: 3 | Status: SHIPPED | OUTPATIENT
Start: 2021-01-21 | End: 2021-04-21

## 2021-01-21 RX ORDER — ATORVASTATIN CALCIUM 40 MG/1
40 TABLET, FILM COATED ORAL DAILY
Qty: 90 TABLET | Refills: 3 | Status: SHIPPED | OUTPATIENT
Start: 2021-01-21 | End: 2021-01-27 | Stop reason: SDUPTHER

## 2021-01-27 ENCOUNTER — PATIENT MESSAGE (OUTPATIENT)
Dept: CARDIOLOGY | Facility: CLINIC | Age: 46
End: 2021-01-27

## 2021-01-27 ENCOUNTER — OFFICE VISIT (OUTPATIENT)
Dept: CARDIOLOGY | Facility: CLINIC | Age: 46
End: 2021-01-27
Payer: MEDICAID

## 2021-01-27 VITALS
WEIGHT: 272 LBS | HEART RATE: 90 BPM | BODY MASS INDEX: 42.69 KG/M2 | HEIGHT: 67 IN | DIASTOLIC BLOOD PRESSURE: 83 MMHG | SYSTOLIC BLOOD PRESSURE: 124 MMHG

## 2021-01-27 DIAGNOSIS — E78.2 MIXED HYPERLIPIDEMIA: ICD-10-CM

## 2021-01-27 DIAGNOSIS — E78.00 PURE HYPERCHOLESTEROLEMIA: Primary | ICD-10-CM

## 2021-01-27 DIAGNOSIS — I10 ESSENTIAL HYPERTENSION: ICD-10-CM

## 2021-01-27 PROCEDURE — 99204 PR OFFICE/OUTPT VISIT, NEW, LEVL IV, 45-59 MIN: ICD-10-PCS | Mod: S$PBB,,, | Performed by: INTERNAL MEDICINE

## 2021-01-27 PROCEDURE — 99999 PR PBB SHADOW E&M-EST. PATIENT-LVL III: ICD-10-PCS | Mod: PBBFAC,,, | Performed by: INTERNAL MEDICINE

## 2021-01-27 PROCEDURE — 99999 PR PBB SHADOW E&M-EST. PATIENT-LVL III: CPT | Mod: PBBFAC,,, | Performed by: INTERNAL MEDICINE

## 2021-01-27 PROCEDURE — 99204 OFFICE O/P NEW MOD 45 MIN: CPT | Mod: S$PBB,,, | Performed by: INTERNAL MEDICINE

## 2021-01-27 PROCEDURE — 99213 OFFICE O/P EST LOW 20 MIN: CPT | Mod: PBBFAC,PN | Performed by: INTERNAL MEDICINE

## 2021-01-27 RX ORDER — ATORVASTATIN CALCIUM 40 MG/1
80 TABLET, FILM COATED ORAL DAILY
Qty: 90 TABLET | Refills: 1 | Status: SHIPPED | OUTPATIENT
Start: 2021-01-27 | End: 2021-03-25

## 2021-02-19 ENCOUNTER — PATIENT MESSAGE (OUTPATIENT)
Dept: OTHER | Facility: OTHER | Age: 46
End: 2021-02-19

## 2021-03-02 ENCOUNTER — HOSPITAL ENCOUNTER (EMERGENCY)
Facility: OTHER | Age: 46
Discharge: HOME | End: 2021-03-02
Attending: EMERGENCY MEDICINE
Payer: MEDICAID

## 2021-03-02 VITALS
TEMPERATURE: 98 F | WEIGHT: 260 LBS | HEART RATE: 95 BPM | RESPIRATION RATE: 16 BRPM | DIASTOLIC BLOOD PRESSURE: 72 MMHG | SYSTOLIC BLOOD PRESSURE: 149 MMHG | OXYGEN SATURATION: 98 % | BODY MASS INDEX: 40.72 KG/M2

## 2021-03-02 DIAGNOSIS — J06.9 URI WITH COUGH AND CONGESTION: Primary | ICD-10-CM

## 2021-03-02 LAB
B-HCG UR QL: NEGATIVE
CTP QC/QA: YES
CTP QC/QA: YES
SARS-COV-2 RDRP RESP QL NAA+PROBE: NEGATIVE

## 2021-03-02 PROCEDURE — 81025 URINE PREGNANCY TEST: CPT | Performed by: EMERGENCY MEDICINE

## 2021-03-02 PROCEDURE — U0002 COVID-19 LAB TEST NON-CDC: HCPCS | Performed by: EMERGENCY MEDICINE

## 2021-03-02 PROCEDURE — 99284 EMERGENCY DEPT VISIT MOD MDM: CPT

## 2021-03-02 RX ORDER — NAPROXEN 500 MG/1
500 TABLET ORAL 2 TIMES DAILY WITH MEALS
Qty: 20 TABLET | Refills: 0 | Status: SHIPPED | OUTPATIENT
Start: 2021-03-02 | End: 2021-04-05

## 2021-03-02 RX ORDER — BENZONATATE 100 MG/1
100 CAPSULE ORAL 3 TIMES DAILY PRN
Qty: 30 CAPSULE | Refills: 0 | Status: SHIPPED | OUTPATIENT
Start: 2021-03-02 | End: 2021-03-12

## 2021-03-02 RX ORDER — FLUTICASONE PROPIONATE 50 MCG
1 SPRAY, SUSPENSION (ML) NASAL 2 TIMES DAILY PRN
Qty: 15 G | Refills: 0 | Status: SHIPPED | OUTPATIENT
Start: 2021-03-02 | End: 2022-06-27

## 2021-03-02 RX ORDER — ONDANSETRON 4 MG/1
4 TABLET, ORALLY DISINTEGRATING ORAL EVERY 6 HOURS PRN
Qty: 20 TABLET | Refills: 0 | Status: SHIPPED | OUTPATIENT
Start: 2021-03-02 | End: 2021-07-06 | Stop reason: ALTCHOICE

## 2021-03-04 ENCOUNTER — OFFICE VISIT (OUTPATIENT)
Dept: INTERNAL MEDICINE | Facility: CLINIC | Age: 46
End: 2021-03-04
Payer: MEDICAID

## 2021-03-04 ENCOUNTER — PATIENT MESSAGE (OUTPATIENT)
Dept: PRIMARY CARE CLINIC | Facility: CLINIC | Age: 46
End: 2021-03-04

## 2021-03-04 VITALS
OXYGEN SATURATION: 99 % | TEMPERATURE: 98 F | DIASTOLIC BLOOD PRESSURE: 82 MMHG | BODY MASS INDEX: 42.14 KG/M2 | SYSTOLIC BLOOD PRESSURE: 145 MMHG | HEART RATE: 100 BPM | WEIGHT: 268.5 LBS | HEIGHT: 67 IN

## 2021-03-04 DIAGNOSIS — R09.81 CONGESTION OF NASAL SINUS: ICD-10-CM

## 2021-03-04 DIAGNOSIS — J06.9 BACTERIAL URI: Primary | ICD-10-CM

## 2021-03-04 DIAGNOSIS — B96.89 BACTERIAL URI: Primary | ICD-10-CM

## 2021-03-04 PROCEDURE — 99999 PR PBB SHADOW E&M-EST. PATIENT-LVL IV: CPT | Mod: PBBFAC,,, | Performed by: STUDENT IN AN ORGANIZED HEALTH CARE EDUCATION/TRAINING PROGRAM

## 2021-03-04 PROCEDURE — 99203 PR OFFICE/OUTPT VISIT, NEW, LEVL III, 30-44 MIN: ICD-10-PCS | Mod: S$PBB,,, | Performed by: STUDENT IN AN ORGANIZED HEALTH CARE EDUCATION/TRAINING PROGRAM

## 2021-03-04 PROCEDURE — 99999 PR PBB SHADOW E&M-EST. PATIENT-LVL IV: ICD-10-PCS | Mod: PBBFAC,,, | Performed by: STUDENT IN AN ORGANIZED HEALTH CARE EDUCATION/TRAINING PROGRAM

## 2021-03-04 PROCEDURE — 99203 OFFICE O/P NEW LOW 30 MIN: CPT | Mod: S$PBB,,, | Performed by: STUDENT IN AN ORGANIZED HEALTH CARE EDUCATION/TRAINING PROGRAM

## 2021-03-04 PROCEDURE — 99214 OFFICE O/P EST MOD 30 MIN: CPT | Mod: PBBFAC | Performed by: STUDENT IN AN ORGANIZED HEALTH CARE EDUCATION/TRAINING PROGRAM

## 2021-03-04 RX ORDER — AMOXICILLIN AND CLAVULANATE POTASSIUM 875; 125 MG/1; MG/1
1 TABLET, FILM COATED ORAL EVERY 12 HOURS
Qty: 14 TABLET | Refills: 0 | Status: CANCELLED | OUTPATIENT
Start: 2021-03-04 | End: 2021-03-11

## 2021-03-04 RX ORDER — AZITHROMYCIN 250 MG/1
TABLET, FILM COATED ORAL
Qty: 6 TABLET | Refills: 0 | Status: SHIPPED | OUTPATIENT
Start: 2021-03-04 | End: 2021-03-09

## 2021-03-08 ENCOUNTER — PATIENT MESSAGE (OUTPATIENT)
Dept: PRIMARY CARE CLINIC | Facility: CLINIC | Age: 46
End: 2021-03-08

## 2021-03-09 ENCOUNTER — OFFICE VISIT (OUTPATIENT)
Dept: PRIMARY CARE CLINIC | Facility: CLINIC | Age: 46
End: 2021-03-09
Payer: MEDICAID

## 2021-03-09 ENCOUNTER — HOSPITAL ENCOUNTER (OUTPATIENT)
Dept: RADIOLOGY | Facility: HOSPITAL | Age: 46
Discharge: HOME | End: 2021-03-09
Attending: FAMILY MEDICINE
Payer: MEDICAID

## 2021-03-09 VITALS
WEIGHT: 266.75 LBS | RESPIRATION RATE: 18 BRPM | DIASTOLIC BLOOD PRESSURE: 80 MMHG | SYSTOLIC BLOOD PRESSURE: 132 MMHG | BODY MASS INDEX: 41.87 KG/M2 | HEART RATE: 92 BPM | OXYGEN SATURATION: 98 % | HEIGHT: 67 IN | TEMPERATURE: 98 F

## 2021-03-09 DIAGNOSIS — R05.9 COUGH: ICD-10-CM

## 2021-03-09 DIAGNOSIS — I10 HTN (HYPERTENSION), BENIGN: ICD-10-CM

## 2021-03-09 DIAGNOSIS — Z96.89 SPINAL CORD STIMULATOR STATUS: ICD-10-CM

## 2021-03-09 DIAGNOSIS — J01.10 ACUTE NON-RECURRENT FRONTAL SINUSITIS: ICD-10-CM

## 2021-03-09 DIAGNOSIS — R06.2 WHEEZING: Primary | ICD-10-CM

## 2021-03-09 DIAGNOSIS — R53.83 FATIGUE, UNSPECIFIED TYPE: ICD-10-CM

## 2021-03-09 PROCEDURE — 99214 PR OFFICE/OUTPT VISIT, EST, LEVL IV, 30-39 MIN: ICD-10-PCS | Mod: S$PBB,,, | Performed by: FAMILY MEDICINE

## 2021-03-09 PROCEDURE — 99999 PR PBB SHADOW E&M-EST. PATIENT-LVL IV: CPT | Mod: PBBFAC,,, | Performed by: FAMILY MEDICINE

## 2021-03-09 PROCEDURE — 71046 X-RAY EXAM CHEST 2 VIEWS: CPT | Mod: TC,PN

## 2021-03-09 PROCEDURE — 99214 OFFICE O/P EST MOD 30 MIN: CPT | Mod: S$PBB,,, | Performed by: FAMILY MEDICINE

## 2021-03-09 PROCEDURE — 71046 XR CHEST PA AND LATERAL: ICD-10-PCS | Mod: 26,,, | Performed by: RADIOLOGY

## 2021-03-09 PROCEDURE — 99999 PR PBB SHADOW E&M-EST. PATIENT-LVL IV: ICD-10-PCS | Mod: PBBFAC,,, | Performed by: FAMILY MEDICINE

## 2021-03-09 PROCEDURE — 99214 OFFICE O/P EST MOD 30 MIN: CPT | Mod: PBBFAC,PN,25 | Performed by: FAMILY MEDICINE

## 2021-03-09 PROCEDURE — 71046 X-RAY EXAM CHEST 2 VIEWS: CPT | Mod: 26,,, | Performed by: RADIOLOGY

## 2021-03-09 RX ORDER — AMOXICILLIN AND CLAVULANATE POTASSIUM 875; 125 MG/1; MG/1
1 TABLET, FILM COATED ORAL 2 TIMES DAILY
Qty: 14 TABLET | Refills: 0 | Status: SHIPPED | OUTPATIENT
Start: 2021-03-09 | End: 2021-07-06 | Stop reason: ALTCHOICE

## 2021-03-09 RX ORDER — ALBUTEROL SULFATE 90 UG/1
2 AEROSOL, METERED RESPIRATORY (INHALATION) EVERY 6 HOURS PRN
Qty: 18 G | Refills: 0 | Status: SHIPPED | OUTPATIENT
Start: 2021-03-09 | End: 2021-11-02 | Stop reason: SDUPTHER

## 2021-03-09 RX ORDER — PROMETHAZINE HYDROCHLORIDE AND CODEINE PHOSPHATE 6.25; 1 MG/5ML; MG/5ML
5 SOLUTION ORAL EVERY 4 HOURS PRN
Qty: 240 ML | Refills: 0 | Status: SHIPPED | OUTPATIENT
Start: 2021-03-09 | End: 2021-03-19

## 2021-03-10 ENCOUNTER — PATIENT MESSAGE (OUTPATIENT)
Dept: PRIMARY CARE CLINIC | Facility: CLINIC | Age: 46
End: 2021-03-10

## 2021-03-11 ENCOUNTER — TELEPHONE (OUTPATIENT)
Dept: HEMATOLOGY/ONCOLOGY | Facility: CLINIC | Age: 46
End: 2021-03-11

## 2021-03-11 DIAGNOSIS — R79.89 ELEVATED PLATELET COUNT: ICD-10-CM

## 2021-03-24 ENCOUNTER — PATIENT MESSAGE (OUTPATIENT)
Dept: PRIMARY CARE CLINIC | Facility: CLINIC | Age: 46
End: 2021-03-24

## 2021-03-25 RX ORDER — ATORVASTATIN CALCIUM 80 MG/1
80 TABLET, FILM COATED ORAL DAILY
Qty: 90 TABLET | Refills: 3 | Status: SHIPPED | OUTPATIENT
Start: 2021-03-25 | End: 2022-04-11

## 2021-04-04 PROBLEM — D75.839 THROMBOCYTOSIS: Status: ACTIVE | Noted: 2021-03-11

## 2021-04-05 ENCOUNTER — PATIENT MESSAGE (OUTPATIENT)
Dept: HEMATOLOGY/ONCOLOGY | Facility: CLINIC | Age: 46
End: 2021-04-05

## 2021-04-05 ENCOUNTER — PATIENT MESSAGE (OUTPATIENT)
Dept: PRIMARY CARE CLINIC | Facility: CLINIC | Age: 46
End: 2021-04-05

## 2021-04-05 ENCOUNTER — OFFICE VISIT (OUTPATIENT)
Dept: HEMATOLOGY/ONCOLOGY | Facility: CLINIC | Age: 46
End: 2021-04-05
Attending: INTERNAL MEDICINE
Payer: MEDICAID

## 2021-04-05 ENCOUNTER — LAB VISIT (OUTPATIENT)
Dept: LAB | Facility: OTHER | Age: 46
End: 2021-04-05
Attending: INTERNAL MEDICINE
Payer: MEDICAID

## 2021-04-05 VITALS
SYSTOLIC BLOOD PRESSURE: 132 MMHG | HEIGHT: 67 IN | TEMPERATURE: 98 F | OXYGEN SATURATION: 100 % | WEIGHT: 271.19 LBS | DIASTOLIC BLOOD PRESSURE: 71 MMHG | HEART RATE: 81 BPM | BODY MASS INDEX: 42.56 KG/M2 | RESPIRATION RATE: 14 BRPM

## 2021-04-05 DIAGNOSIS — R10.13 EPIGASTRIC ABDOMINAL PAIN: ICD-10-CM

## 2021-04-05 DIAGNOSIS — D75.839 THROMBOCYTOSIS: ICD-10-CM

## 2021-04-05 DIAGNOSIS — D64.9 NORMOCYTIC ANEMIA: ICD-10-CM

## 2021-04-05 DIAGNOSIS — R79.89 ELEVATED PLATELET COUNT: ICD-10-CM

## 2021-04-05 DIAGNOSIS — D75.839 THROMBOCYTOSIS: Primary | ICD-10-CM

## 2021-04-05 DIAGNOSIS — R14.0 ABDOMINAL BLOATING: Primary | ICD-10-CM

## 2021-04-05 LAB
ERYTHROCYTE [DISTWIDTH] IN BLOOD BY AUTOMATED COUNT: 14 % (ref 11.5–14.5)
ERYTHROCYTE [SEDIMENTATION RATE] IN BLOOD: 60 MM/HR (ref 0–20)
HCT VFR BLD AUTO: 30.4 % (ref 37–48.5)
HGB BLD-MCNC: 9.7 G/DL (ref 12–16)
IMM GRANULOCYTES # BLD AUTO: 0.02 K/UL (ref 0–0.04)
MCH RBC QN AUTO: 28.1 PG (ref 27–31)
MCHC RBC AUTO-ENTMCNC: 31.9 G/DL (ref 32–36)
MCV RBC AUTO: 88 FL (ref 82–98)
NEUTROPHILS # BLD AUTO: 4.4 K/UL (ref 1.8–7.7)
PLATELET # BLD AUTO: 299 K/UL (ref 150–450)
PMV BLD AUTO: 9.8 FL (ref 9.2–12.9)
RBC # BLD AUTO: 3.45 M/UL (ref 4–5.4)
WBC # BLD AUTO: 8.93 K/UL (ref 3.9–12.7)

## 2021-04-05 PROCEDURE — 99999 PR PBB SHADOW E&M-EST. PATIENT-LVL V: CPT | Mod: PBBFAC,,, | Performed by: INTERNAL MEDICINE

## 2021-04-05 PROCEDURE — 99203 PR OFFICE/OUTPT VISIT, NEW, LEVL III, 30-44 MIN: ICD-10-PCS | Mod: S$PBB,,, | Performed by: INTERNAL MEDICINE

## 2021-04-05 PROCEDURE — 82728 ASSAY OF FERRITIN: CPT | Performed by: INTERNAL MEDICINE

## 2021-04-05 PROCEDURE — 99215 OFFICE O/P EST HI 40 MIN: CPT | Mod: PBBFAC | Performed by: INTERNAL MEDICINE

## 2021-04-05 PROCEDURE — 99999 PR PBB SHADOW E&M-EST. PATIENT-LVL V: ICD-10-PCS | Mod: PBBFAC,,, | Performed by: INTERNAL MEDICINE

## 2021-04-05 PROCEDURE — 85027 COMPLETE CBC AUTOMATED: CPT | Performed by: INTERNAL MEDICINE

## 2021-04-05 PROCEDURE — 99203 OFFICE O/P NEW LOW 30 MIN: CPT | Mod: S$PBB,,, | Performed by: INTERNAL MEDICINE

## 2021-04-05 PROCEDURE — 36415 COLL VENOUS BLD VENIPUNCTURE: CPT | Performed by: INTERNAL MEDICINE

## 2021-04-05 PROCEDURE — 85651 RBC SED RATE NONAUTOMATED: CPT | Performed by: INTERNAL MEDICINE

## 2021-04-06 DIAGNOSIS — R10.13 EPIGASTRIC ABDOMINAL PAIN: Primary | ICD-10-CM

## 2021-04-06 LAB — FERRITIN SERPL-MCNC: 69 NG/ML (ref 20–300)

## 2021-04-08 DIAGNOSIS — R10.13 EPIGASTRIC PAIN: Primary | ICD-10-CM

## 2021-04-14 DIAGNOSIS — D75.839 THROMBOCYTOSIS: Primary | ICD-10-CM

## 2021-04-15 ENCOUNTER — HOSPITAL ENCOUNTER (OUTPATIENT)
Dept: RADIOLOGY | Facility: OTHER | Age: 46
Discharge: HOME | End: 2021-04-15
Attending: INTERNAL MEDICINE
Payer: MEDICAID

## 2021-04-15 DIAGNOSIS — R10.13 EPIGASTRIC PAIN: ICD-10-CM

## 2021-04-15 PROCEDURE — 74177 CT ABD & PELVIS W/CONTRAST: CPT | Mod: TC

## 2021-04-15 PROCEDURE — A9698 NON-RAD CONTRAST MATERIALNOC: HCPCS | Performed by: INTERNAL MEDICINE

## 2021-04-15 PROCEDURE — 25500020 PHARM REV CODE 255: Performed by: INTERNAL MEDICINE

## 2021-04-15 PROCEDURE — 74177 CT ABD & PELVIS W/CONTRAST: CPT | Mod: 26,,, | Performed by: RADIOLOGY

## 2021-04-15 PROCEDURE — 74177 CT ABDOMEN PELVIS WITH CONTRAST: ICD-10-PCS | Mod: 26,,, | Performed by: RADIOLOGY

## 2021-04-15 RX ADMIN — IOHEXOL 100 ML: 350 INJECTION, SOLUTION INTRAVENOUS at 11:04

## 2021-04-15 RX ADMIN — IOHEXOL 1000 ML: 12 SOLUTION ORAL at 10:04

## 2021-04-19 ENCOUNTER — PATIENT MESSAGE (OUTPATIENT)
Dept: PRIMARY CARE CLINIC | Facility: CLINIC | Age: 46
End: 2021-04-19

## 2021-04-21 ENCOUNTER — PATIENT MESSAGE (OUTPATIENT)
Dept: PRIMARY CARE CLINIC | Facility: CLINIC | Age: 46
End: 2021-04-21

## 2021-04-23 ENCOUNTER — PATIENT MESSAGE (OUTPATIENT)
Dept: HEMATOLOGY/ONCOLOGY | Facility: CLINIC | Age: 46
End: 2021-04-23

## 2021-04-26 ENCOUNTER — LAB VISIT (OUTPATIENT)
Dept: LAB | Facility: HOSPITAL | Age: 46
End: 2021-04-26
Attending: FAMILY MEDICINE
Payer: MEDICAID

## 2021-04-26 DIAGNOSIS — I10 HTN (HYPERTENSION), BENIGN: ICD-10-CM

## 2021-04-26 LAB
ALBUMIN SERPL BCP-MCNC: 3.1 G/DL (ref 3.5–5.2)
ALP SERPL-CCNC: 78 U/L (ref 55–135)
ALT SERPL W/O P-5'-P-CCNC: 14 U/L (ref 10–44)
ANION GAP SERPL CALC-SCNC: 10 MMOL/L (ref 8–16)
AST SERPL-CCNC: 17 U/L (ref 10–40)
BILIRUB SERPL-MCNC: 0.4 MG/DL (ref 0.1–1)
BUN SERPL-MCNC: 13 MG/DL (ref 6–20)
CALCIUM SERPL-MCNC: 8.9 MG/DL (ref 8.7–10.5)
CHLORIDE SERPL-SCNC: 102 MMOL/L (ref 95–110)
CO2 SERPL-SCNC: 27 MMOL/L (ref 23–29)
CREAT SERPL-MCNC: 1.1 MG/DL (ref 0.5–1.4)
EST. GFR  (AFRICAN AMERICAN): >60 ML/MIN/1.73 M^2
EST. GFR  (NON AFRICAN AMERICAN): 59.9 ML/MIN/1.73 M^2
GLUCOSE SERPL-MCNC: 99 MG/DL (ref 70–110)
POTASSIUM SERPL-SCNC: 3.4 MMOL/L (ref 3.5–5.1)
PROT SERPL-MCNC: 6.8 G/DL (ref 6–8.4)
SODIUM SERPL-SCNC: 139 MMOL/L (ref 136–145)

## 2021-04-26 PROCEDURE — 80053 COMPREHEN METABOLIC PANEL: CPT | Performed by: FAMILY MEDICINE

## 2021-04-26 PROCEDURE — 36415 COLL VENOUS BLD VENIPUNCTURE: CPT | Mod: PN | Performed by: FAMILY MEDICINE

## 2021-04-27 ENCOUNTER — PATIENT MESSAGE (OUTPATIENT)
Dept: PRIMARY CARE CLINIC | Facility: CLINIC | Age: 46
End: 2021-04-27

## 2021-06-23 ENCOUNTER — PATIENT MESSAGE (OUTPATIENT)
Dept: PRIMARY CARE CLINIC | Facility: CLINIC | Age: 46
End: 2021-06-23

## 2021-07-06 ENCOUNTER — TELEPHONE (OUTPATIENT)
Dept: GENETICS | Facility: CLINIC | Age: 46
End: 2021-07-06

## 2021-07-06 ENCOUNTER — LAB VISIT (OUTPATIENT)
Dept: LAB | Facility: HOSPITAL | Age: 46
End: 2021-07-06
Attending: FAMILY MEDICINE
Payer: MEDICAID

## 2021-07-06 ENCOUNTER — OFFICE VISIT (OUTPATIENT)
Dept: PRIMARY CARE CLINIC | Facility: CLINIC | Age: 46
End: 2021-07-06
Payer: MEDICAID

## 2021-07-06 VITALS
WEIGHT: 272.25 LBS | HEIGHT: 67 IN | SYSTOLIC BLOOD PRESSURE: 116 MMHG | DIASTOLIC BLOOD PRESSURE: 78 MMHG | TEMPERATURE: 98 F | BODY MASS INDEX: 42.73 KG/M2 | HEART RATE: 88 BPM

## 2021-07-06 DIAGNOSIS — E78.01 FAMILIAL HYPERCHOLESTEROLEMIA: ICD-10-CM

## 2021-07-06 DIAGNOSIS — Z02.0 SCHOOL PHYSICAL EXAM: Primary | ICD-10-CM

## 2021-07-06 DIAGNOSIS — R10.13 EPIGASTRIC ABDOMINAL PAIN: ICD-10-CM

## 2021-07-06 DIAGNOSIS — E66.01 CLASS 3 SEVERE OBESITY DUE TO EXCESS CALORIES WITH SERIOUS COMORBIDITY AND BODY MASS INDEX (BMI) OF 40.0 TO 44.9 IN ADULT: ICD-10-CM

## 2021-07-06 DIAGNOSIS — Z02.0 SCHOOL PHYSICAL EXAM: ICD-10-CM

## 2021-07-06 DIAGNOSIS — D64.9 NORMOCYTIC ANEMIA: ICD-10-CM

## 2021-07-06 PROCEDURE — 86762 RUBELLA ANTIBODY: CPT | Performed by: FAMILY MEDICINE

## 2021-07-06 PROCEDURE — 99215 OFFICE O/P EST HI 40 MIN: CPT | Mod: PBBFAC,PN | Performed by: FAMILY MEDICINE

## 2021-07-06 PROCEDURE — 86735 MUMPS ANTIBODY: CPT | Performed by: FAMILY MEDICINE

## 2021-07-06 PROCEDURE — 86580 TB INTRADERMAL TEST: CPT | Mod: PBBFAC,PN

## 2021-07-06 PROCEDURE — 86765 RUBEOLA ANTIBODY: CPT | Performed by: FAMILY MEDICINE

## 2021-07-06 PROCEDURE — 99214 PR OFFICE/OUTPT VISIT, EST, LEVL IV, 30-39 MIN: ICD-10-PCS | Mod: S$PBB,,, | Performed by: FAMILY MEDICINE

## 2021-07-06 PROCEDURE — 99999 PR PBB SHADOW E&M-EST. PATIENT-LVL V: ICD-10-PCS | Mod: PBBFAC,,, | Performed by: FAMILY MEDICINE

## 2021-07-06 PROCEDURE — 99999 PR PBB SHADOW E&M-EST. PATIENT-LVL V: CPT | Mod: PBBFAC,,, | Performed by: FAMILY MEDICINE

## 2021-07-06 PROCEDURE — 99214 OFFICE O/P EST MOD 30 MIN: CPT | Mod: S$PBB,,, | Performed by: FAMILY MEDICINE

## 2021-07-06 PROCEDURE — 86706 HEP B SURFACE ANTIBODY: CPT | Performed by: FAMILY MEDICINE

## 2021-07-06 PROCEDURE — 36415 COLL VENOUS BLD VENIPUNCTURE: CPT | Mod: PN | Performed by: FAMILY MEDICINE

## 2021-07-06 RX ORDER — DICLOFENAC SODIUM 10 MG/G
GEL TOPICAL
COMMUNITY
Start: 2021-01-19 | End: 2021-12-22

## 2021-07-06 RX ADMIN — TUBERCULIN PURIFIED PROTEIN DERIVATIVE 5 UNITS: 5 INJECTION, SOLUTION INTRADERMAL at 01:07

## 2021-07-07 ENCOUNTER — TELEPHONE (OUTPATIENT)
Dept: ENDOSCOPY | Facility: HOSPITAL | Age: 46
End: 2021-07-07

## 2021-07-07 DIAGNOSIS — R10.13 EPIGASTRIC PAIN: Primary | ICD-10-CM

## 2021-07-07 LAB
HBV SURFACE AB SER-ACNC: NEGATIVE M[IU]/ML
MUMPS IGG INTERPRETATION: POSITIVE
MUMPS IGG SCREEN: 2.81 ISR (ref 0–0.9)
RUBEOLA IGG ANTIBODY: 2.23 ISR (ref 0–0.9)
RUBEOLA INTERPRETATION: POSITIVE
RUBV IGG SER-ACNC: 35.8 IU/ML
RUBV IGG SER-IMP: REACTIVE

## 2021-07-08 ENCOUNTER — PATIENT MESSAGE (OUTPATIENT)
Dept: PHARMACY | Facility: CLINIC | Age: 46
End: 2021-07-08

## 2021-07-08 ENCOUNTER — CLINICAL SUPPORT (OUTPATIENT)
Dept: PRIMARY CARE CLINIC | Facility: CLINIC | Age: 46
End: 2021-07-08
Payer: MEDICAID

## 2021-07-09 ENCOUNTER — TELEPHONE (OUTPATIENT)
Dept: BARIATRICS | Facility: CLINIC | Age: 46
End: 2021-07-09

## 2021-07-15 ENCOUNTER — PATIENT MESSAGE (OUTPATIENT)
Dept: ENDOSCOPY | Facility: HOSPITAL | Age: 46
End: 2021-07-15

## 2021-07-16 ENCOUNTER — PATIENT MESSAGE (OUTPATIENT)
Dept: PRIMARY CARE CLINIC | Facility: CLINIC | Age: 46
End: 2021-07-16

## 2021-07-24 ENCOUNTER — LAB VISIT (OUTPATIENT)
Dept: SPORTS MEDICINE | Facility: CLINIC | Age: 46
End: 2021-07-24
Payer: MEDICAID

## 2021-07-24 DIAGNOSIS — R10.13 EPIGASTRIC PAIN: ICD-10-CM

## 2021-07-24 PROCEDURE — U0005 INFEC AGEN DETEC AMPLI PROBE: HCPCS | Performed by: FAMILY MEDICINE

## 2021-07-24 PROCEDURE — U0003 INFECTIOUS AGENT DETECTION BY NUCLEIC ACID (DNA OR RNA); SEVERE ACUTE RESPIRATORY SYNDROME CORONAVIRUS 2 (SARS-COV-2) (CORONAVIRUS DISEASE [COVID-19]), AMPLIFIED PROBE TECHNIQUE, MAKING USE OF HIGH THROUGHPUT TECHNOLOGIES AS DESCRIBED BY CMS-2020-01-R: HCPCS | Performed by: FAMILY MEDICINE

## 2021-07-25 LAB — SARS-COV-2 RNA RESP QL NAA+PROBE: NOT DETECTED

## 2021-07-26 ENCOUNTER — LAB VISIT (OUTPATIENT)
Dept: LAB | Facility: HOSPITAL | Age: 46
End: 2021-07-26
Payer: MEDICAID

## 2021-07-26 DIAGNOSIS — I10 HTN (HYPERTENSION), BENIGN: ICD-10-CM

## 2021-07-26 LAB — POTASSIUM SERPL-SCNC: 3.1 MMOL/L (ref 3.5–5.1)

## 2021-07-26 PROCEDURE — 36415 COLL VENOUS BLD VENIPUNCTURE: CPT | Mod: PN | Performed by: FAMILY MEDICINE

## 2021-07-26 PROCEDURE — 84132 ASSAY OF SERUM POTASSIUM: CPT | Performed by: FAMILY MEDICINE

## 2021-07-27 ENCOUNTER — ANESTHESIA EVENT (OUTPATIENT)
Dept: ENDOSCOPY | Facility: HOSPITAL | Age: 46
End: 2021-07-27
Payer: MEDICAID

## 2021-07-27 ENCOUNTER — HOSPITAL ENCOUNTER (OUTPATIENT)
Facility: HOSPITAL | Age: 46
Discharge: HOME | End: 2021-07-27
Attending: INTERNAL MEDICINE | Admitting: INTERNAL MEDICINE
Payer: MEDICAID

## 2021-07-27 ENCOUNTER — ANESTHESIA (OUTPATIENT)
Dept: ENDOSCOPY | Facility: HOSPITAL | Age: 46
End: 2021-07-27
Payer: MEDICAID

## 2021-07-27 VITALS
OXYGEN SATURATION: 98 % | SYSTOLIC BLOOD PRESSURE: 118 MMHG | BODY MASS INDEX: 40.81 KG/M2 | HEIGHT: 67 IN | RESPIRATION RATE: 16 BRPM | DIASTOLIC BLOOD PRESSURE: 73 MMHG | WEIGHT: 260 LBS | TEMPERATURE: 98 F | HEART RATE: 71 BPM

## 2021-07-27 DIAGNOSIS — R10.13 EPIGASTRIC ABDOMINAL PAIN: Primary | ICD-10-CM

## 2021-07-27 LAB
B-HCG UR QL: NEGATIVE
CTP QC/QA: YES

## 2021-07-27 PROCEDURE — 00731 ANES UPR GI NDSC PX NOS: CPT | Performed by: INTERNAL MEDICINE

## 2021-07-27 PROCEDURE — 25000003 PHARM REV CODE 250: Performed by: INTERNAL MEDICINE

## 2021-07-27 PROCEDURE — 25000003 PHARM REV CODE 250: Performed by: SURGERY

## 2021-07-27 PROCEDURE — 88305 TISSUE EXAM BY PATHOLOGIST: ICD-10-PCS | Mod: 26,,, | Performed by: PATHOLOGY

## 2021-07-27 PROCEDURE — 27201012 HC FORCEPS, HOT/COLD, DISP: Performed by: INTERNAL MEDICINE

## 2021-07-27 PROCEDURE — 43239 EGD BIOPSY SINGLE/MULTIPLE: CPT | Mod: ,,, | Performed by: INTERNAL MEDICINE

## 2021-07-27 PROCEDURE — E9220 PRA ENDO ANESTHESIA: ICD-10-PCS | Mod: ,,, | Performed by: STUDENT IN AN ORGANIZED HEALTH CARE EDUCATION/TRAINING PROGRAM

## 2021-07-27 PROCEDURE — 37000008 HC ANESTHESIA 1ST 15 MINUTES: Performed by: INTERNAL MEDICINE

## 2021-07-27 PROCEDURE — 81025 URINE PREGNANCY TEST: CPT | Performed by: INTERNAL MEDICINE

## 2021-07-27 PROCEDURE — 43239 PR EGD, FLEX, W/BIOPSY, SGL/MULTI: ICD-10-PCS | Mod: ,,, | Performed by: INTERNAL MEDICINE

## 2021-07-27 PROCEDURE — 63600175 PHARM REV CODE 636 W HCPCS: Performed by: STUDENT IN AN ORGANIZED HEALTH CARE EDUCATION/TRAINING PROGRAM

## 2021-07-27 PROCEDURE — 88305 TISSUE EXAM BY PATHOLOGIST: CPT | Mod: 26,,, | Performed by: PATHOLOGY

## 2021-07-27 PROCEDURE — 88305 TISSUE EXAM BY PATHOLOGIST: CPT | Performed by: PATHOLOGY

## 2021-07-27 PROCEDURE — 43239 EGD BIOPSY SINGLE/MULTIPLE: CPT | Performed by: INTERNAL MEDICINE

## 2021-07-27 PROCEDURE — 37000009 HC ANESTHESIA EA ADD 15 MINS: Performed by: INTERNAL MEDICINE

## 2021-07-27 PROCEDURE — E9220 PRA ENDO ANESTHESIA: HCPCS | Mod: ,,, | Performed by: STUDENT IN AN ORGANIZED HEALTH CARE EDUCATION/TRAINING PROGRAM

## 2021-07-27 RX ORDER — SODIUM CITRATE AND CITRIC ACID MONOHYDRATE 334; 500 MG/5ML; MG/5ML
30 SOLUTION ORAL ONCE
Status: COMPLETED | OUTPATIENT
Start: 2021-07-27 | End: 2021-07-27

## 2021-07-27 RX ORDER — FAMOTIDINE 10 MG/ML
20 INJECTION INTRAVENOUS 2 TIMES DAILY
Status: DISCONTINUED | OUTPATIENT
Start: 2021-07-27 | End: 2021-07-27 | Stop reason: HOSPADM

## 2021-07-27 RX ORDER — SODIUM CHLORIDE 9 MG/ML
INJECTION, SOLUTION INTRAVENOUS CONTINUOUS
Status: DISCONTINUED | OUTPATIENT
Start: 2021-07-27 | End: 2021-07-27 | Stop reason: HOSPADM

## 2021-07-27 RX ORDER — PROPOFOL 10 MG/ML
VIAL (ML) INTRAVENOUS CONTINUOUS PRN
Status: DISCONTINUED | OUTPATIENT
Start: 2021-07-27 | End: 2021-07-27

## 2021-07-27 RX ORDER — LIDOCAINE HCL/PF 100 MG/5ML
SYRINGE (ML) INTRAVENOUS
Status: DISCONTINUED | OUTPATIENT
Start: 2021-07-27 | End: 2021-07-27

## 2021-07-27 RX ORDER — PROPOFOL 10 MG/ML
VIAL (ML) INTRAVENOUS
Status: DISCONTINUED | OUTPATIENT
Start: 2021-07-27 | End: 2021-07-27

## 2021-07-27 RX ADMIN — Medication 80 MG: at 10:07

## 2021-07-27 RX ADMIN — FAMOTIDINE 20 MG: 10 INJECTION INTRAVENOUS at 09:07

## 2021-07-27 RX ADMIN — PROPOFOL 70 MG: 10 INJECTION, EMULSION INTRAVENOUS at 10:07

## 2021-07-27 RX ADMIN — SODIUM CHLORIDE: 0.9 INJECTION, SOLUTION INTRAVENOUS at 10:07

## 2021-07-27 RX ADMIN — SODIUM CITRATE AND CITRIC ACID MONOHYDRATE 30 ML: 500; 334 SOLUTION ORAL at 10:07

## 2021-07-27 RX ADMIN — PROPOFOL 200 MCG/KG/MIN: 10 INJECTION, EMULSION INTRAVENOUS at 10:07

## 2021-07-30 ENCOUNTER — TELEPHONE (OUTPATIENT)
Dept: PRIMARY CARE CLINIC | Facility: CLINIC | Age: 46
End: 2021-07-30

## 2021-07-30 DIAGNOSIS — E87.6 LOW BLOOD POTASSIUM: Primary | ICD-10-CM

## 2021-07-30 PROBLEM — K29.70 GASTRITIS: Status: ACTIVE | Noted: 2021-07-30

## 2021-08-03 ENCOUNTER — TELEPHONE (OUTPATIENT)
Dept: ENDOSCOPY | Facility: HOSPITAL | Age: 46
End: 2021-08-03

## 2021-08-03 LAB
FINAL PATHOLOGIC DIAGNOSIS: NORMAL
Lab: NORMAL

## 2021-08-03 RX ORDER — BISMUTH SUBCITRATE POTASSIUM, METRONIDAZOLE, TETRACYCLINE HYDROCHLORIDE 140; 125; 125 MG/1; MG/1; MG/1
3 CAPSULE ORAL
Qty: 120 CAPSULE | Refills: 0 | Status: SHIPPED | OUTPATIENT
Start: 2021-08-03 | End: 2021-08-13

## 2021-08-04 ENCOUNTER — IMMUNIZATION (OUTPATIENT)
Dept: INTERNAL MEDICINE | Facility: CLINIC | Age: 46
End: 2021-08-04
Payer: MEDICAID

## 2021-08-04 DIAGNOSIS — Z23 NEED FOR VACCINATION: Primary | ICD-10-CM

## 2021-08-04 PROCEDURE — 0001A COVID-19, MRNA, LNP-S, PF, 30 MCG/0.3 ML DOSE VACCINE: ICD-10-PCS | Mod: CV19,,, | Performed by: INTERNAL MEDICINE

## 2021-08-04 PROCEDURE — 91300 COVID-19, MRNA, LNP-S, PF, 30 MCG/0.3 ML DOSE VACCINE: CPT | Mod: ,,, | Performed by: INTERNAL MEDICINE

## 2021-08-04 PROCEDURE — 0001A COVID-19, MRNA, LNP-S, PF, 30 MCG/0.3 ML DOSE VACCINE: CPT | Mod: CV19,,, | Performed by: INTERNAL MEDICINE

## 2021-08-04 PROCEDURE — 91300 COVID-19, MRNA, LNP-S, PF, 30 MCG/0.3 ML DOSE VACCINE: ICD-10-PCS | Mod: ,,, | Performed by: INTERNAL MEDICINE

## 2021-08-06 ENCOUNTER — LAB VISIT (OUTPATIENT)
Dept: LAB | Facility: HOSPITAL | Age: 46
End: 2021-08-06
Attending: FAMILY MEDICINE
Payer: MEDICAID

## 2021-08-06 DIAGNOSIS — E87.6 LOW BLOOD POTASSIUM: ICD-10-CM

## 2021-08-06 LAB — POTASSIUM SERPL-SCNC: 4.1 MMOL/L (ref 3.5–5.1)

## 2021-08-06 PROCEDURE — 84132 ASSAY OF SERUM POTASSIUM: CPT | Performed by: FAMILY MEDICINE

## 2021-08-06 PROCEDURE — 36415 COLL VENOUS BLD VENIPUNCTURE: CPT | Mod: PN | Performed by: FAMILY MEDICINE

## 2021-08-09 ENCOUNTER — PATIENT MESSAGE (OUTPATIENT)
Dept: PRIMARY CARE CLINIC | Facility: CLINIC | Age: 46
End: 2021-08-09

## 2021-08-16 ENCOUNTER — TELEPHONE (OUTPATIENT)
Dept: PRIMARY CARE CLINIC | Facility: CLINIC | Age: 46
End: 2021-08-16

## 2021-08-16 ENCOUNTER — PATIENT MESSAGE (OUTPATIENT)
Dept: PRIMARY CARE CLINIC | Facility: CLINIC | Age: 46
End: 2021-08-16

## 2021-08-17 ENCOUNTER — OFFICE VISIT (OUTPATIENT)
Dept: PRIMARY CARE CLINIC | Facility: CLINIC | Age: 46
End: 2021-08-17
Payer: MEDICAID

## 2021-08-17 ENCOUNTER — PATIENT MESSAGE (OUTPATIENT)
Dept: PRIMARY CARE CLINIC | Facility: CLINIC | Age: 46
End: 2021-08-17

## 2021-08-17 DIAGNOSIS — D64.9 NORMOCYTIC ANEMIA: ICD-10-CM

## 2021-08-17 DIAGNOSIS — K29.70 GASTRITIS, PRESENCE OF BLEEDING UNSPECIFIED, UNSPECIFIED CHRONICITY, UNSPECIFIED GASTRITIS TYPE: ICD-10-CM

## 2021-08-17 DIAGNOSIS — E87.6 LOW BLOOD POTASSIUM: Primary | ICD-10-CM

## 2021-08-17 DIAGNOSIS — I10 HTN (HYPERTENSION), BENIGN: ICD-10-CM

## 2021-08-17 PROBLEM — K21.9 GASTROESOPHAGEAL REFLUX DISEASE WITHOUT ESOPHAGITIS: Status: RESOLVED | Noted: 2018-08-28 | Resolved: 2021-08-17

## 2021-08-17 PROCEDURE — 99214 PR OFFICE/OUTPT VISIT, EST, LEVL IV, 30-39 MIN: ICD-10-PCS | Mod: 95,,, | Performed by: FAMILY MEDICINE

## 2021-08-17 PROCEDURE — 99214 OFFICE O/P EST MOD 30 MIN: CPT | Mod: 95,,, | Performed by: FAMILY MEDICINE

## 2021-08-20 ENCOUNTER — LAB VISIT (OUTPATIENT)
Dept: LAB | Facility: HOSPITAL | Age: 46
End: 2021-08-20
Attending: FAMILY MEDICINE
Payer: MEDICAID

## 2021-08-20 DIAGNOSIS — I10 HTN (HYPERTENSION), BENIGN: ICD-10-CM

## 2021-08-20 DIAGNOSIS — D64.9 NORMOCYTIC ANEMIA: ICD-10-CM

## 2021-08-20 LAB
BASOPHILS # BLD AUTO: 0.07 K/UL (ref 0–0.2)
BASOPHILS NFR BLD: 1 % (ref 0–1.9)
DIFFERENTIAL METHOD: ABNORMAL
EOSINOPHIL # BLD AUTO: 0.2 K/UL (ref 0–0.5)
EOSINOPHIL NFR BLD: 2.6 % (ref 0–8)
ERYTHROCYTE [DISTWIDTH] IN BLOOD BY AUTOMATED COUNT: 14.7 % (ref 11.5–14.5)
FERRITIN SERPL-MCNC: 28 NG/ML (ref 20–300)
HCT VFR BLD AUTO: 34.5 % (ref 37–48.5)
HGB BLD-MCNC: 10.6 G/DL (ref 12–16)
IMM GRANULOCYTES # BLD AUTO: 0.01 K/UL (ref 0–0.04)
IMM GRANULOCYTES NFR BLD AUTO: 0.1 % (ref 0–0.5)
IRON SERPL-MCNC: 49 UG/DL (ref 30–160)
LYMPHOCYTES # BLD AUTO: 2.6 K/UL (ref 1–4.8)
LYMPHOCYTES NFR BLD: 37.8 % (ref 18–48)
MCH RBC QN AUTO: 28.2 PG (ref 27–31)
MCHC RBC AUTO-ENTMCNC: 30.7 G/DL (ref 32–36)
MCV RBC AUTO: 92 FL (ref 82–98)
MONOCYTES # BLD AUTO: 0.7 K/UL (ref 0.3–1)
MONOCYTES NFR BLD: 9.9 % (ref 4–15)
NEUTROPHILS # BLD AUTO: 3.3 K/UL (ref 1.8–7.7)
NEUTROPHILS NFR BLD: 48.6 % (ref 38–73)
NRBC BLD-RTO: 0 /100 WBC
PLATELET # BLD AUTO: 350 K/UL (ref 150–450)
PMV BLD AUTO: 11.3 FL (ref 9.2–12.9)
POTASSIUM SERPL-SCNC: 3.6 MMOL/L (ref 3.5–5.1)
RBC # BLD AUTO: 3.76 M/UL (ref 4–5.4)
SATURATED IRON: 20 % (ref 20–50)
TOTAL IRON BINDING CAPACITY: 240 UG/DL (ref 250–450)
TRANSFERRIN SERPL-MCNC: 162 MG/DL (ref 200–375)
VIT B12 SERPL-MCNC: 984 PG/ML (ref 210–950)
WBC # BLD AUTO: 6.87 K/UL (ref 3.9–12.7)

## 2021-08-20 PROCEDURE — 84132 ASSAY OF SERUM POTASSIUM: CPT | Performed by: FAMILY MEDICINE

## 2021-08-20 PROCEDURE — 84466 ASSAY OF TRANSFERRIN: CPT | Performed by: FAMILY MEDICINE

## 2021-08-20 PROCEDURE — 36415 COLL VENOUS BLD VENIPUNCTURE: CPT | Mod: PN | Performed by: FAMILY MEDICINE

## 2021-08-20 PROCEDURE — 82728 ASSAY OF FERRITIN: CPT | Performed by: FAMILY MEDICINE

## 2021-08-20 PROCEDURE — 82607 VITAMIN B-12: CPT | Performed by: FAMILY MEDICINE

## 2021-08-20 PROCEDURE — 85025 COMPLETE CBC W/AUTO DIFF WBC: CPT | Performed by: FAMILY MEDICINE

## 2021-08-24 ENCOUNTER — TELEPHONE (OUTPATIENT)
Dept: PRIMARY CARE CLINIC | Facility: CLINIC | Age: 46
End: 2021-08-24

## 2021-08-24 ENCOUNTER — PATIENT MESSAGE (OUTPATIENT)
Dept: PRIMARY CARE CLINIC | Facility: CLINIC | Age: 46
End: 2021-08-24

## 2021-08-25 ENCOUNTER — IMMUNIZATION (OUTPATIENT)
Dept: INTERNAL MEDICINE | Facility: CLINIC | Age: 46
End: 2021-08-25
Payer: MEDICAID

## 2021-08-25 DIAGNOSIS — Z23 NEED FOR VACCINATION: Primary | ICD-10-CM

## 2021-08-25 PROCEDURE — 91300 COVID-19, MRNA, LNP-S, PF, 30 MCG/0.3 ML DOSE VACCINE: ICD-10-PCS | Mod: ,,, | Performed by: INTERNAL MEDICINE

## 2021-08-25 PROCEDURE — 0002A COVID-19, MRNA, LNP-S, PF, 30 MCG/0.3 ML DOSE VACCINE: ICD-10-PCS | Mod: CV19,,, | Performed by: INTERNAL MEDICINE

## 2021-08-25 PROCEDURE — 0002A COVID-19, MRNA, LNP-S, PF, 30 MCG/0.3 ML DOSE VACCINE: CPT | Mod: CV19,,, | Performed by: INTERNAL MEDICINE

## 2021-08-25 PROCEDURE — 91300 COVID-19, MRNA, LNP-S, PF, 30 MCG/0.3 ML DOSE VACCINE: CPT | Mod: ,,, | Performed by: INTERNAL MEDICINE

## 2021-08-26 DIAGNOSIS — D50.8 IRON DEFICIENCY ANEMIA SECONDARY TO INADEQUATE DIETARY IRON INTAKE: Primary | ICD-10-CM

## 2021-09-07 ENCOUNTER — OFFICE VISIT (OUTPATIENT)
Dept: PRIMARY CARE CLINIC | Facility: CLINIC | Age: 46
End: 2021-09-07
Payer: MEDICAID

## 2021-09-07 ENCOUNTER — PATIENT MESSAGE (OUTPATIENT)
Dept: PHARMACY | Facility: CLINIC | Age: 46
End: 2021-09-07

## 2021-09-07 VITALS
DIASTOLIC BLOOD PRESSURE: 80 MMHG | OXYGEN SATURATION: 97 % | SYSTOLIC BLOOD PRESSURE: 118 MMHG | WEIGHT: 272.94 LBS | TEMPERATURE: 99 F | BODY MASS INDEX: 42.84 KG/M2 | HEIGHT: 67 IN | HEART RATE: 109 BPM

## 2021-09-07 DIAGNOSIS — R05.9 COUGH: Primary | ICD-10-CM

## 2021-09-07 DIAGNOSIS — D50.8 IRON DEFICIENCY ANEMIA SECONDARY TO INADEQUATE DIETARY IRON INTAKE: ICD-10-CM

## 2021-09-07 DIAGNOSIS — I10 HTN (HYPERTENSION), BENIGN: ICD-10-CM

## 2021-09-07 DIAGNOSIS — R09.81 NASAL CONGESTION: ICD-10-CM

## 2021-09-07 PROCEDURE — 99999 PR PBB SHADOW E&M-EST. PATIENT-LVL IV: CPT | Mod: PBBFAC,,, | Performed by: FAMILY MEDICINE

## 2021-09-07 PROCEDURE — 99999 PR PBB SHADOW E&M-EST. PATIENT-LVL IV: ICD-10-PCS | Mod: PBBFAC,,, | Performed by: FAMILY MEDICINE

## 2021-09-07 PROCEDURE — 99214 PR OFFICE/OUTPT VISIT, EST, LEVL IV, 30-39 MIN: ICD-10-PCS | Mod: S$PBB,,, | Performed by: FAMILY MEDICINE

## 2021-09-07 PROCEDURE — 99214 OFFICE O/P EST MOD 30 MIN: CPT | Mod: S$PBB,,, | Performed by: FAMILY MEDICINE

## 2021-09-07 PROCEDURE — 99214 OFFICE O/P EST MOD 30 MIN: CPT | Mod: PBBFAC,PN | Performed by: FAMILY MEDICINE

## 2021-09-07 RX ORDER — PROMETHAZINE HYDROCHLORIDE AND DEXTROMETHORPHAN HYDROBROMIDE 6.25; 15 MG/5ML; MG/5ML
5 SYRUP ORAL 3 TIMES DAILY PRN
Qty: 240 ML | Refills: 0 | Status: SHIPPED | OUTPATIENT
Start: 2021-09-07 | End: 2021-09-23

## 2021-09-08 ENCOUNTER — PATIENT MESSAGE (OUTPATIENT)
Dept: PRIMARY CARE CLINIC | Facility: CLINIC | Age: 46
End: 2021-09-08

## 2021-09-08 DIAGNOSIS — Z02.0 SCHOOL PHYSICAL EXAM: Primary | ICD-10-CM

## 2021-09-10 ENCOUNTER — CLINICAL SUPPORT (OUTPATIENT)
Dept: PRIMARY CARE CLINIC | Facility: CLINIC | Age: 46
End: 2021-09-10
Payer: MEDICAID

## 2021-09-10 DIAGNOSIS — Z23 NEED FOR TUBERCULOSIS VACCINATION: Primary | ICD-10-CM

## 2021-09-10 PROCEDURE — 86580 TB INTRADERMAL TEST: CPT | Mod: PBBFAC,PN

## 2021-09-13 ENCOUNTER — CLINICAL SUPPORT (OUTPATIENT)
Dept: PRIMARY CARE CLINIC | Facility: CLINIC | Age: 46
End: 2021-09-13
Payer: MEDICAID

## 2021-09-13 DIAGNOSIS — Z12.11 SPECIAL SCREENING FOR MALIGNANT NEOPLASMS, COLON: Primary | ICD-10-CM

## 2021-09-13 DIAGNOSIS — Z11.1 ENCOUNTER FOR PPD SKIN TEST READING: Primary | ICD-10-CM

## 2021-09-13 DIAGNOSIS — Z01.818 PRE-OP TESTING: ICD-10-CM

## 2021-09-13 LAB
TB INDURATION - 48 HR READ: 48 MM
TB INDURATION - 72 HR READ: 752 MM
TB SKIN TEST - 48 HR READ: NEGATIVE
TB SKIN TEST - 72 HR READ: NEGATIVE

## 2021-09-13 RX ORDER — SODIUM, POTASSIUM,MAG SULFATES 17.5-3.13G
1 SOLUTION, RECONSTITUTED, ORAL ORAL DAILY
Qty: 1 KIT | Refills: 0 | Status: ON HOLD | OUTPATIENT
Start: 2021-09-13 | End: 2021-09-17 | Stop reason: HOSPADM

## 2021-09-14 ENCOUNTER — LAB VISIT (OUTPATIENT)
Dept: SURGERY | Facility: CLINIC | Age: 46
End: 2021-09-14
Payer: MEDICAID

## 2021-09-14 DIAGNOSIS — Z01.818 PRE-OP TESTING: ICD-10-CM

## 2021-09-14 LAB — SARS-COV-2 RNA RESP QL NAA+PROBE: NOT DETECTED

## 2021-09-14 PROCEDURE — U0003 INFECTIOUS AGENT DETECTION BY NUCLEIC ACID (DNA OR RNA); SEVERE ACUTE RESPIRATORY SYNDROME CORONAVIRUS 2 (SARS-COV-2) (CORONAVIRUS DISEASE [COVID-19]), AMPLIFIED PROBE TECHNIQUE, MAKING USE OF HIGH THROUGHPUT TECHNOLOGIES AS DESCRIBED BY CMS-2020-01-R: HCPCS | Performed by: FAMILY MEDICINE

## 2021-09-14 PROCEDURE — U0005 INFEC AGEN DETEC AMPLI PROBE: HCPCS | Performed by: FAMILY MEDICINE

## 2021-09-16 ENCOUNTER — ANESTHESIA EVENT (OUTPATIENT)
Dept: ENDOSCOPY | Facility: HOSPITAL | Age: 46
End: 2021-09-16
Payer: MEDICAID

## 2021-09-17 ENCOUNTER — ANESTHESIA (OUTPATIENT)
Dept: ENDOSCOPY | Facility: HOSPITAL | Age: 46
End: 2021-09-17
Payer: MEDICAID

## 2021-09-17 ENCOUNTER — HOSPITAL ENCOUNTER (OUTPATIENT)
Facility: HOSPITAL | Age: 46
Discharge: HOME | End: 2021-09-17
Attending: INTERNAL MEDICINE | Admitting: INTERNAL MEDICINE
Payer: MEDICAID

## 2021-09-17 VITALS
RESPIRATION RATE: 18 BRPM | OXYGEN SATURATION: 99 % | TEMPERATURE: 97 F | HEART RATE: 76 BPM | WEIGHT: 260 LBS | SYSTOLIC BLOOD PRESSURE: 161 MMHG | DIASTOLIC BLOOD PRESSURE: 89 MMHG | BODY MASS INDEX: 40.81 KG/M2 | HEIGHT: 67 IN

## 2021-09-17 DIAGNOSIS — Z12.11 COLON CANCER SCREENING: Primary | ICD-10-CM

## 2021-09-17 PROBLEM — K63.3: Status: ACTIVE | Noted: 2021-09-17

## 2021-09-17 LAB
B-HCG UR QL: NEGATIVE
CTP QC/QA: YES

## 2021-09-17 PROCEDURE — 45380 COLONOSCOPY AND BIOPSY: CPT | Mod: ,,, | Performed by: INTERNAL MEDICINE

## 2021-09-17 PROCEDURE — 45380 PR COLONOSCOPY,BIOPSY: ICD-10-PCS | Mod: ,,, | Performed by: INTERNAL MEDICINE

## 2021-09-17 PROCEDURE — 25000003 PHARM REV CODE 250: Performed by: STUDENT IN AN ORGANIZED HEALTH CARE EDUCATION/TRAINING PROGRAM

## 2021-09-17 PROCEDURE — 63600175 PHARM REV CODE 636 W HCPCS: Performed by: STUDENT IN AN ORGANIZED HEALTH CARE EDUCATION/TRAINING PROGRAM

## 2021-09-17 PROCEDURE — 37000008 HC ANESTHESIA 1ST 15 MINUTES: Performed by: INTERNAL MEDICINE

## 2021-09-17 PROCEDURE — 45380 COLONOSCOPY AND BIOPSY: CPT | Performed by: INTERNAL MEDICINE

## 2021-09-17 PROCEDURE — 00811 ANES LWR INTST NDSC NOS: CPT | Performed by: INTERNAL MEDICINE

## 2021-09-17 PROCEDURE — 88305 TISSUE EXAM BY PATHOLOGIST: CPT | Performed by: PATHOLOGY

## 2021-09-17 PROCEDURE — 88305 TISSUE EXAM BY PATHOLOGIST: ICD-10-PCS | Mod: 26,,, | Performed by: PATHOLOGY

## 2021-09-17 PROCEDURE — E9220 PRA ENDO ANESTHESIA: ICD-10-PCS | Mod: ,,, | Performed by: STUDENT IN AN ORGANIZED HEALTH CARE EDUCATION/TRAINING PROGRAM

## 2021-09-17 PROCEDURE — 81025 URINE PREGNANCY TEST: CPT | Performed by: INTERNAL MEDICINE

## 2021-09-17 PROCEDURE — 88305 TISSUE EXAM BY PATHOLOGIST: CPT | Mod: 26,,, | Performed by: PATHOLOGY

## 2021-09-17 PROCEDURE — 27201012 HC FORCEPS, HOT/COLD, DISP: Performed by: INTERNAL MEDICINE

## 2021-09-17 PROCEDURE — E9220 PRA ENDO ANESTHESIA: HCPCS | Mod: ,,, | Performed by: STUDENT IN AN ORGANIZED HEALTH CARE EDUCATION/TRAINING PROGRAM

## 2021-09-17 PROCEDURE — 37000009 HC ANESTHESIA EA ADD 15 MINS: Performed by: INTERNAL MEDICINE

## 2021-09-17 RX ORDER — LIDOCAINE HCL/PF 100 MG/5ML
SYRINGE (ML) INTRAVENOUS
Status: DISCONTINUED | OUTPATIENT
Start: 2021-09-17 | End: 2021-09-17

## 2021-09-17 RX ORDER — PROPOFOL 10 MG/ML
VIAL (ML) INTRAVENOUS CONTINUOUS PRN
Status: DISCONTINUED | OUTPATIENT
Start: 2021-09-17 | End: 2021-09-17

## 2021-09-17 RX ORDER — SODIUM CHLORIDE 9 MG/ML
INJECTION, SOLUTION INTRAVENOUS CONTINUOUS
Status: DISCONTINUED | OUTPATIENT
Start: 2021-09-17 | End: 2021-09-17 | Stop reason: HOSPADM

## 2021-09-17 RX ORDER — PROPOFOL 10 MG/ML
VIAL (ML) INTRAVENOUS
Status: DISCONTINUED | OUTPATIENT
Start: 2021-09-17 | End: 2021-09-17

## 2021-09-17 RX ADMIN — Medication 100 MG: at 08:09

## 2021-09-17 RX ADMIN — PROPOFOL 70 MG: 10 INJECTION, EMULSION INTRAVENOUS at 08:09

## 2021-09-17 RX ADMIN — SODIUM CHLORIDE: 9 INJECTION, SOLUTION INTRAVENOUS at 07:09

## 2021-09-17 RX ADMIN — PROPOFOL 100 MCG/KG/MIN: 10 INJECTION, EMULSION INTRAVENOUS at 08:09

## 2021-09-22 ENCOUNTER — PATIENT MESSAGE (OUTPATIENT)
Dept: PRIMARY CARE CLINIC | Facility: CLINIC | Age: 46
End: 2021-09-22

## 2021-09-22 DIAGNOSIS — K63.3: Primary | ICD-10-CM

## 2021-09-22 LAB
FINAL PATHOLOGIC DIAGNOSIS: NORMAL
GROSS: NORMAL
Lab: NORMAL

## 2021-09-23 ENCOUNTER — TELEPHONE (OUTPATIENT)
Dept: PRIMARY CARE CLINIC | Facility: CLINIC | Age: 46
End: 2021-09-23

## 2021-09-23 DIAGNOSIS — K31.9 GASTROPATHY: ICD-10-CM

## 2021-09-23 DIAGNOSIS — K52.9 COLITIS: Primary | ICD-10-CM

## 2021-09-24 ENCOUNTER — TELEPHONE (OUTPATIENT)
Dept: GASTROENTEROLOGY | Facility: CLINIC | Age: 46
End: 2021-09-24

## 2021-09-24 ENCOUNTER — PATIENT MESSAGE (OUTPATIENT)
Dept: GASTROENTEROLOGY | Facility: CLINIC | Age: 46
End: 2021-09-24

## 2021-09-24 ENCOUNTER — CLINICAL SUPPORT (OUTPATIENT)
Dept: PRIMARY CARE CLINIC | Facility: CLINIC | Age: 46
End: 2021-09-24
Payer: MEDICAID

## 2021-09-24 DIAGNOSIS — Z23 NEED FOR TUBERCULOSIS VACCINATION: Primary | ICD-10-CM

## 2021-09-27 ENCOUNTER — CLINICAL SUPPORT (OUTPATIENT)
Dept: PRIMARY CARE CLINIC | Facility: CLINIC | Age: 46
End: 2021-09-27
Payer: MEDICAID

## 2021-09-27 DIAGNOSIS — Z23 NEED FOR TUBERCULOSIS VACCINATION: Primary | ICD-10-CM

## 2021-10-08 ENCOUNTER — IMMUNIZATION (OUTPATIENT)
Dept: PHARMACY | Facility: CLINIC | Age: 46
End: 2021-10-08
Payer: MEDICAID

## 2021-10-11 ENCOUNTER — LAB VISIT (OUTPATIENT)
Dept: LAB | Facility: HOSPITAL | Age: 46
End: 2021-10-11
Attending: FAMILY MEDICINE
Payer: MEDICAID

## 2021-10-11 DIAGNOSIS — Z02.0 SCHOOL PHYSICAL EXAM: ICD-10-CM

## 2021-10-11 PROCEDURE — 86706 HEP B SURFACE ANTIBODY: CPT | Performed by: FAMILY MEDICINE

## 2021-10-11 PROCEDURE — 36415 COLL VENOUS BLD VENIPUNCTURE: CPT | Mod: PN | Performed by: FAMILY MEDICINE

## 2021-10-12 LAB — HBV SURFACE AB SER-ACNC: POSITIVE M[IU]/ML

## 2021-10-27 ENCOUNTER — TELEPHONE (OUTPATIENT)
Dept: GASTROENTEROLOGY | Facility: CLINIC | Age: 46
End: 2021-10-27

## 2021-10-27 ENCOUNTER — PATIENT MESSAGE (OUTPATIENT)
Dept: PRIMARY CARE CLINIC | Facility: CLINIC | Age: 46
End: 2021-10-27

## 2021-11-02 RX ORDER — ALBUTEROL SULFATE 90 UG/1
2 AEROSOL, METERED RESPIRATORY (INHALATION) EVERY 6 HOURS PRN
Qty: 18 G | Refills: 0 | Status: SHIPPED | OUTPATIENT
Start: 2021-11-02 | End: 2021-12-03

## 2021-11-09 ENCOUNTER — LAB VISIT (OUTPATIENT)
Dept: LAB | Facility: HOSPITAL | Age: 46
End: 2021-11-09
Attending: FAMILY MEDICINE
Payer: MEDICAID

## 2021-11-09 DIAGNOSIS — I10 HTN (HYPERTENSION), BENIGN: ICD-10-CM

## 2021-11-09 LAB — POTASSIUM SERPL-SCNC: 3.5 MMOL/L (ref 3.5–5.1)

## 2021-11-09 PROCEDURE — 36415 COLL VENOUS BLD VENIPUNCTURE: CPT | Mod: PN | Performed by: FAMILY MEDICINE

## 2021-11-09 PROCEDURE — 84132 ASSAY OF SERUM POTASSIUM: CPT | Performed by: FAMILY MEDICINE

## 2021-11-12 ENCOUNTER — HOSPITAL ENCOUNTER (EMERGENCY)
Facility: HOSPITAL | Age: 46
Discharge: HOME | End: 2021-11-12
Attending: EMERGENCY MEDICINE
Payer: MEDICAID

## 2021-11-12 VITALS
DIASTOLIC BLOOD PRESSURE: 61 MMHG | RESPIRATION RATE: 15 BRPM | TEMPERATURE: 97 F | BODY MASS INDEX: 40.81 KG/M2 | HEIGHT: 67 IN | HEART RATE: 102 BPM | WEIGHT: 260 LBS | OXYGEN SATURATION: 100 % | SYSTOLIC BLOOD PRESSURE: 141 MMHG

## 2021-11-12 DIAGNOSIS — N17.9 AKI (ACUTE KIDNEY INJURY): ICD-10-CM

## 2021-11-12 DIAGNOSIS — R55 NEAR SYNCOPE: Primary | ICD-10-CM

## 2021-11-12 LAB
ALBUMIN SERPL BCP-MCNC: 3.6 G/DL (ref 3.5–5.2)
ALP SERPL-CCNC: 86 U/L (ref 55–135)
ALT SERPL W/O P-5'-P-CCNC: 11 U/L (ref 10–44)
ANION GAP SERPL CALC-SCNC: 12 MMOL/L (ref 8–16)
AST SERPL-CCNC: 15 U/L (ref 10–40)
B-HCG UR QL: NEGATIVE
BASOPHILS # BLD AUTO: 0.05 K/UL (ref 0–0.2)
BASOPHILS NFR BLD: 0.5 % (ref 0–1.9)
BILIRUB SERPL-MCNC: 0.8 MG/DL (ref 0.1–1)
BUN SERPL-MCNC: 17 MG/DL (ref 6–20)
BUN SERPL-MCNC: 18 MG/DL (ref 6–30)
CALCIUM SERPL-MCNC: 9.2 MG/DL (ref 8.7–10.5)
CHLORIDE SERPL-SCNC: 101 MMOL/L (ref 95–110)
CHLORIDE SERPL-SCNC: 101 MMOL/L (ref 95–110)
CO2 SERPL-SCNC: 23 MMOL/L (ref 23–29)
CREAT SERPL-MCNC: 1.5 MG/DL (ref 0.5–1.4)
CREAT SERPL-MCNC: 1.8 MG/DL (ref 0.5–1.4)
CTP QC/QA: YES
DIFFERENTIAL METHOD: ABNORMAL
EOSINOPHIL # BLD AUTO: 0.1 K/UL (ref 0–0.5)
EOSINOPHIL NFR BLD: 1 % (ref 0–8)
ERYTHROCYTE [DISTWIDTH] IN BLOOD BY AUTOMATED COUNT: 13.5 % (ref 11.5–14.5)
EST. GFR  (AFRICAN AMERICAN): 47.5 ML/MIN/1.73 M^2
EST. GFR  (NON AFRICAN AMERICAN): 41.2 ML/MIN/1.73 M^2
GLUCOSE SERPL-MCNC: 88 MG/DL (ref 70–110)
GLUCOSE SERPL-MCNC: 93 MG/DL (ref 70–110)
HCT VFR BLD AUTO: 35.7 % (ref 37–48.5)
HCT VFR BLD CALC: 34 %PCV (ref 36–54)
HGB BLD-MCNC: 11.5 G/DL (ref 12–16)
IMM GRANULOCYTES # BLD AUTO: 0.03 K/UL (ref 0–0.04)
IMM GRANULOCYTES NFR BLD AUTO: 0.3 % (ref 0–0.5)
LIPASE SERPL-CCNC: 10 U/L (ref 4–60)
LYMPHOCYTES # BLD AUTO: 2 K/UL (ref 1–4.8)
LYMPHOCYTES NFR BLD: 18.8 % (ref 18–48)
MAGNESIUM SERPL-MCNC: 1.9 MG/DL (ref 1.6–2.6)
MCH RBC QN AUTO: 27.8 PG (ref 27–31)
MCHC RBC AUTO-ENTMCNC: 32.2 G/DL (ref 32–36)
MCV RBC AUTO: 86 FL (ref 82–98)
MONOCYTES # BLD AUTO: 0.8 K/UL (ref 0.3–1)
MONOCYTES NFR BLD: 7.5 % (ref 4–15)
NEUTROPHILS # BLD AUTO: 7.6 K/UL (ref 1.8–7.7)
NEUTROPHILS NFR BLD: 71.9 % (ref 38–73)
NRBC BLD-RTO: 0 /100 WBC
PLATELET # BLD AUTO: 344 K/UL (ref 150–450)
PMV BLD AUTO: 10.2 FL (ref 9.2–12.9)
POC IONIZED CALCIUM: 1.02 MMOL/L (ref 1.06–1.42)
POC TCO2 (MEASURED): 23 MMOL/L (ref 23–29)
POTASSIUM BLD-SCNC: 3.1 MMOL/L (ref 3.5–5.1)
POTASSIUM SERPL-SCNC: 3.3 MMOL/L (ref 3.5–5.1)
PROT SERPL-MCNC: 7.5 G/DL (ref 6–8.4)
RBC # BLD AUTO: 4.13 M/UL (ref 4–5.4)
SAMPLE: ABNORMAL
SODIUM BLD-SCNC: 138 MMOL/L (ref 136–145)
SODIUM SERPL-SCNC: 136 MMOL/L (ref 136–145)
WBC # BLD AUTO: 10.52 K/UL (ref 3.9–12.7)

## 2021-11-12 PROCEDURE — 83735 ASSAY OF MAGNESIUM: CPT | Performed by: EMERGENCY MEDICINE

## 2021-11-12 PROCEDURE — 99284 PR EMERGENCY DEPT VISIT,LEVEL IV: ICD-10-PCS | Mod: ,,, | Performed by: EMERGENCY MEDICINE

## 2021-11-12 PROCEDURE — 80053 COMPREHEN METABOLIC PANEL: CPT | Performed by: EMERGENCY MEDICINE

## 2021-11-12 PROCEDURE — 99284 EMERGENCY DEPT VISIT MOD MDM: CPT | Mod: ,,, | Performed by: EMERGENCY MEDICINE

## 2021-11-12 PROCEDURE — 83690 ASSAY OF LIPASE: CPT | Performed by: EMERGENCY MEDICINE

## 2021-11-12 PROCEDURE — 80047 BASIC METABLC PNL IONIZED CA: CPT

## 2021-11-12 PROCEDURE — 93010 EKG 12-LEAD: ICD-10-PCS | Mod: ,,, | Performed by: INTERNAL MEDICINE

## 2021-11-12 PROCEDURE — 96360 HYDRATION IV INFUSION INIT: CPT

## 2021-11-12 PROCEDURE — 81025 URINE PREGNANCY TEST: CPT | Performed by: EMERGENCY MEDICINE

## 2021-11-12 PROCEDURE — 93010 ELECTROCARDIOGRAM REPORT: CPT | Mod: ,,, | Performed by: INTERNAL MEDICINE

## 2021-11-12 PROCEDURE — 93005 ELECTROCARDIOGRAM TRACING: CPT

## 2021-11-12 PROCEDURE — 63600175 PHARM REV CODE 636 W HCPCS: Performed by: EMERGENCY MEDICINE

## 2021-11-12 PROCEDURE — 99284 EMERGENCY DEPT VISIT MOD MDM: CPT | Mod: 25

## 2021-11-12 PROCEDURE — 85025 COMPLETE CBC W/AUTO DIFF WBC: CPT | Performed by: EMERGENCY MEDICINE

## 2021-11-12 RX ADMIN — SODIUM CHLORIDE, SODIUM LACTATE, POTASSIUM CHLORIDE, AND CALCIUM CHLORIDE 500 ML: .6; .31; .03; .02 INJECTION, SOLUTION INTRAVENOUS at 12:11

## 2021-11-19 ENCOUNTER — PATIENT MESSAGE (OUTPATIENT)
Dept: PRIMARY CARE CLINIC | Facility: CLINIC | Age: 46
End: 2021-11-19
Payer: MEDICAID

## 2021-11-19 DIAGNOSIS — E87.6 HYPOKALEMIA: Primary | ICD-10-CM

## 2021-11-19 DIAGNOSIS — N17.9 AKI (ACUTE KIDNEY INJURY): ICD-10-CM

## 2021-11-19 DIAGNOSIS — I10 HTN (HYPERTENSION), BENIGN: ICD-10-CM

## 2021-11-19 DIAGNOSIS — I95.1 ORTHOSTATIC HYPOTENSION: ICD-10-CM

## 2021-12-03 RX ORDER — ALBUTEROL SULFATE 90 UG/1
AEROSOL, METERED RESPIRATORY (INHALATION)
Qty: 18 G | Refills: 0 | Status: SHIPPED | OUTPATIENT
Start: 2021-12-03 | End: 2022-08-24 | Stop reason: SDUPTHER

## 2021-12-10 ENCOUNTER — LAB VISIT (OUTPATIENT)
Dept: LAB | Facility: HOSPITAL | Age: 46
End: 2021-12-10
Attending: FAMILY MEDICINE
Payer: MEDICAID

## 2021-12-10 DIAGNOSIS — E87.6 HYPOKALEMIA: ICD-10-CM

## 2021-12-10 LAB
ALBUMIN SERPL BCP-MCNC: 3.4 G/DL (ref 3.5–5.2)
ALP SERPL-CCNC: 74 U/L (ref 55–135)
ALT SERPL W/O P-5'-P-CCNC: 9 U/L (ref 10–44)
ANION GAP SERPL CALC-SCNC: 7 MMOL/L (ref 8–16)
AST SERPL-CCNC: 13 U/L (ref 10–40)
BILIRUB SERPL-MCNC: 0.6 MG/DL (ref 0.1–1)
BUN SERPL-MCNC: 14 MG/DL (ref 6–20)
CALCIUM SERPL-MCNC: 9 MG/DL (ref 8.7–10.5)
CHLORIDE SERPL-SCNC: 101 MMOL/L (ref 95–110)
CO2 SERPL-SCNC: 28 MMOL/L (ref 23–29)
CREAT SERPL-MCNC: 0.8 MG/DL (ref 0.5–1.4)
EST. GFR  (AFRICAN AMERICAN): >60 ML/MIN/1.73 M^2
EST. GFR  (NON AFRICAN AMERICAN): >60 ML/MIN/1.73 M^2
GLUCOSE SERPL-MCNC: 97 MG/DL (ref 70–110)
POTASSIUM SERPL-SCNC: 3.5 MMOL/L (ref 3.5–5.1)
PROT SERPL-MCNC: 7.3 G/DL (ref 6–8.4)
SODIUM SERPL-SCNC: 136 MMOL/L (ref 136–145)

## 2021-12-10 PROCEDURE — 36415 COLL VENOUS BLD VENIPUNCTURE: CPT | Mod: PN | Performed by: FAMILY MEDICINE

## 2021-12-10 PROCEDURE — 80053 COMPREHEN METABOLIC PANEL: CPT | Performed by: FAMILY MEDICINE

## 2021-12-16 ENCOUNTER — OFFICE VISIT (OUTPATIENT)
Dept: CARDIOLOGY | Facility: CLINIC | Age: 46
End: 2021-12-16
Payer: MEDICAID

## 2021-12-16 VITALS
HEIGHT: 67 IN | BODY MASS INDEX: 42.37 KG/M2 | SYSTOLIC BLOOD PRESSURE: 106 MMHG | HEART RATE: 99 BPM | WEIGHT: 269.94 LBS | DIASTOLIC BLOOD PRESSURE: 72 MMHG

## 2021-12-16 DIAGNOSIS — R06.02 SHORTNESS OF BREATH: Primary | ICD-10-CM

## 2021-12-16 DIAGNOSIS — I10 PRIMARY HYPERTENSION: ICD-10-CM

## 2021-12-16 DIAGNOSIS — N17.9 AKI (ACUTE KIDNEY INJURY): ICD-10-CM

## 2021-12-16 DIAGNOSIS — I95.1 ORTHOSTATIC HYPOTENSION: ICD-10-CM

## 2021-12-16 DIAGNOSIS — E87.6 HYPOKALEMIA: ICD-10-CM

## 2021-12-16 PROCEDURE — 4010F ACE/ARB THERAPY RXD/TAKEN: CPT | Mod: CPTII,,, | Performed by: INTERNAL MEDICINE

## 2021-12-16 PROCEDURE — 99999 PR PBB SHADOW E&M-EST. PATIENT-LVL IV: ICD-10-PCS | Mod: PBBFAC,,, | Performed by: INTERNAL MEDICINE

## 2021-12-16 PROCEDURE — 99999 PR PBB SHADOW E&M-EST. PATIENT-LVL IV: CPT | Mod: PBBFAC,,, | Performed by: INTERNAL MEDICINE

## 2021-12-16 PROCEDURE — 99214 PR OFFICE/OUTPT VISIT, EST, LEVL IV, 30-39 MIN: ICD-10-PCS | Mod: S$PBB,,, | Performed by: INTERNAL MEDICINE

## 2021-12-16 PROCEDURE — 4010F PR ACE/ARB THEARPY RXD/TAKEN: ICD-10-PCS | Mod: CPTII,,, | Performed by: INTERNAL MEDICINE

## 2021-12-16 PROCEDURE — 99214 OFFICE O/P EST MOD 30 MIN: CPT | Mod: S$PBB,,, | Performed by: INTERNAL MEDICINE

## 2021-12-16 PROCEDURE — 99214 OFFICE O/P EST MOD 30 MIN: CPT | Mod: PBBFAC,PN | Performed by: INTERNAL MEDICINE

## 2021-12-22 ENCOUNTER — OFFICE VISIT (OUTPATIENT)
Dept: GASTROENTEROLOGY | Facility: CLINIC | Age: 46
End: 2021-12-22
Payer: MEDICAID

## 2021-12-22 ENCOUNTER — LAB VISIT (OUTPATIENT)
Dept: LAB | Facility: HOSPITAL | Age: 46
End: 2021-12-22
Attending: INTERNAL MEDICINE
Payer: MEDICAID

## 2021-12-22 ENCOUNTER — TELEPHONE (OUTPATIENT)
Dept: GASTROENTEROLOGY | Facility: CLINIC | Age: 46
End: 2021-12-22

## 2021-12-22 VITALS
DIASTOLIC BLOOD PRESSURE: 75 MMHG | HEART RATE: 106 BPM | WEIGHT: 266.75 LBS | HEIGHT: 67 IN | BODY MASS INDEX: 41.87 KG/M2 | SYSTOLIC BLOOD PRESSURE: 127 MMHG

## 2021-12-22 DIAGNOSIS — B96.81 HELICOBACTER POSITIVE GASTRITIS: ICD-10-CM

## 2021-12-22 DIAGNOSIS — K52.9 COLITIS: ICD-10-CM

## 2021-12-22 DIAGNOSIS — D64.9 ANEMIA, UNSPECIFIED TYPE: Primary | ICD-10-CM

## 2021-12-22 DIAGNOSIS — R10.13 DYSPEPSIA: ICD-10-CM

## 2021-12-22 DIAGNOSIS — K21.9 GASTROESOPHAGEAL REFLUX DISEASE WITHOUT ESOPHAGITIS: ICD-10-CM

## 2021-12-22 DIAGNOSIS — K29.70 HELICOBACTER POSITIVE GASTRITIS: ICD-10-CM

## 2021-12-22 DIAGNOSIS — K31.9 GASTROPATHY: ICD-10-CM

## 2021-12-22 DIAGNOSIS — D64.9 ANEMIA, UNSPECIFIED TYPE: ICD-10-CM

## 2021-12-22 LAB
FERRITIN SERPL-MCNC: 80 NG/ML (ref 20–300)
IRON SERPL-MCNC: 67 UG/DL (ref 30–160)
SATURATED IRON: 21 % (ref 20–50)
TOTAL IRON BINDING CAPACITY: 320 UG/DL (ref 250–450)
TRANSFERRIN SERPL-MCNC: 216 MG/DL (ref 200–375)

## 2021-12-22 PROCEDURE — 99213 PR OFFICE/OUTPT VISIT, EST, LEVL III, 20-29 MIN: ICD-10-PCS | Mod: S$PBB,,, | Performed by: INTERNAL MEDICINE

## 2021-12-22 PROCEDURE — 1159F PR MEDICATION LIST DOCUMENTED IN MEDICAL RECORD: ICD-10-PCS | Mod: CPTII,,, | Performed by: INTERNAL MEDICINE

## 2021-12-22 PROCEDURE — 3008F PR BODY MASS INDEX (BMI) DOCUMENTED: ICD-10-PCS | Mod: CPTII,,, | Performed by: INTERNAL MEDICINE

## 2021-12-22 PROCEDURE — 1159F MED LIST DOCD IN RCRD: CPT | Mod: CPTII,,, | Performed by: INTERNAL MEDICINE

## 2021-12-22 PROCEDURE — 4010F PR ACE/ARB THEARPY RXD/TAKEN: ICD-10-PCS | Mod: CPTII,,, | Performed by: INTERNAL MEDICINE

## 2021-12-22 PROCEDURE — 3008F BODY MASS INDEX DOCD: CPT | Mod: CPTII,,, | Performed by: INTERNAL MEDICINE

## 2021-12-22 PROCEDURE — 99213 OFFICE O/P EST LOW 20 MIN: CPT | Mod: PBBFAC | Performed by: INTERNAL MEDICINE

## 2021-12-22 PROCEDURE — 36415 COLL VENOUS BLD VENIPUNCTURE: CPT | Performed by: INTERNAL MEDICINE

## 2021-12-22 PROCEDURE — 99999 PR PBB SHADOW E&M-EST. PATIENT-LVL III: ICD-10-PCS | Mod: PBBFAC,,, | Performed by: INTERNAL MEDICINE

## 2021-12-22 PROCEDURE — 82728 ASSAY OF FERRITIN: CPT | Performed by: INTERNAL MEDICINE

## 2021-12-22 PROCEDURE — 4010F ACE/ARB THERAPY RXD/TAKEN: CPT | Mod: CPTII,,, | Performed by: INTERNAL MEDICINE

## 2021-12-22 PROCEDURE — 99213 OFFICE O/P EST LOW 20 MIN: CPT | Mod: S$PBB,,, | Performed by: INTERNAL MEDICINE

## 2021-12-22 PROCEDURE — 84466 ASSAY OF TRANSFERRIN: CPT | Performed by: INTERNAL MEDICINE

## 2021-12-22 PROCEDURE — 99999 PR PBB SHADOW E&M-EST. PATIENT-LVL III: CPT | Mod: PBBFAC,,, | Performed by: INTERNAL MEDICINE

## 2021-12-22 RX ORDER — PANTOPRAZOLE SODIUM 40 MG/1
40 TABLET, DELAYED RELEASE ORAL DAILY
Qty: 90 TABLET | Refills: 0 | Status: SHIPPED | OUTPATIENT
Start: 2021-12-22 | End: 2022-03-11

## 2021-12-27 ENCOUNTER — PATIENT MESSAGE (OUTPATIENT)
Dept: CARDIOLOGY | Facility: CLINIC | Age: 46
End: 2021-12-27
Payer: MEDICAID

## 2021-12-27 ENCOUNTER — HOSPITAL ENCOUNTER (OUTPATIENT)
Dept: CARDIOLOGY | Facility: HOSPITAL | Age: 46
Discharge: HOME | End: 2021-12-27
Attending: INTERNAL MEDICINE
Payer: MEDICAID

## 2021-12-27 ENCOUNTER — PATIENT MESSAGE (OUTPATIENT)
Dept: GASTROENTEROLOGY | Facility: CLINIC | Age: 46
End: 2021-12-27
Payer: MEDICAID

## 2021-12-27 VITALS
SYSTOLIC BLOOD PRESSURE: 125 MMHG | HEART RATE: 80 BPM | WEIGHT: 269 LBS | HEIGHT: 67 IN | DIASTOLIC BLOOD PRESSURE: 75 MMHG | BODY MASS INDEX: 42.22 KG/M2

## 2021-12-27 DIAGNOSIS — R06.02 SHORTNESS OF BREATH: ICD-10-CM

## 2021-12-27 LAB
ASCENDING AORTA: 2.96 CM
AV INDEX (PROSTH): 0.72
AV MEAN GRADIENT: 6 MMHG
AV PEAK GRADIENT: 12 MMHG
AV VALVE AREA: 2.13 CM2
AV VELOCITY RATIO: 0.66
BSA FOR ECHO PROCEDURE: 2.4 M2
CV ECHO LV RWT: 0.33 CM
DOP CALC AO PEAK VEL: 1.7 M/S
DOP CALC AO VTI: 31.53 CM
DOP CALC LVOT AREA: 3 CM2
DOP CALC LVOT DIAMETER: 1.94 CM
DOP CALC LVOT PEAK VEL: 1.12 M/S
DOP CALC LVOT STROKE VOLUME: 67.1 CM3
DOP CALCLVOT PEAK VEL VTI: 22.71 CM
E WAVE DECELERATION TIME: 153.56 MSEC
E/A RATIO: 0.93
E/E' RATIO: 8.74 M/S
ECHO LV POSTERIOR WALL: 0.74 CM (ref 0.6–1.1)
EJECTION FRACTION: 65 %
FRACTIONAL SHORTENING: 34 % (ref 28–44)
INTERVENTRICULAR SEPTUM: 0.73 CM (ref 0.6–1.1)
IVRT: 65.65 MSEC
LA MAJOR: 5.39 CM
LA MINOR: 5 CM
LA WIDTH: 3.86 CM
LEFT ATRIUM SIZE: 3.85 CM
LEFT ATRIUM VOLUME INDEX MOD: 24.5 ML/M2
LEFT ATRIUM VOLUME INDEX: 28.6 ML/M2
LEFT ATRIUM VOLUME MOD: 56.02 CM3
LEFT ATRIUM VOLUME: 65.53 CM3
LEFT INTERNAL DIMENSION IN SYSTOLE: 2.98 CM (ref 2.1–4)
LEFT VENTRICLE DIASTOLIC VOLUME INDEX: 41.24 ML/M2
LEFT VENTRICLE DIASTOLIC VOLUME: 94.43 ML
LEFT VENTRICLE MASS INDEX: 45 G/M2
LEFT VENTRICLE SYSTOLIC VOLUME INDEX: 15.1 ML/M2
LEFT VENTRICLE SYSTOLIC VOLUME: 34.53 ML
LEFT VENTRICULAR INTERNAL DIMENSION IN DIASTOLE: 4.54 CM (ref 3.5–6)
LEFT VENTRICULAR MASS: 103.36 G
LV LATERAL E/E' RATIO: 8.3 M/S
LV SEPTAL E/E' RATIO: 9.22 M/S
MV A" WAVE DURATION": 8.28 MSEC
MV PEAK A VEL: 0.89 M/S
MV PEAK E VEL: 0.83 M/S
MV STENOSIS PRESSURE HALF TIME: 44.53 MS
MV VALVE AREA P 1/2 METHOD: 4.94 CM2
PISA TR MAX VEL: 2.28 M/S
PULM VEIN S/D RATIO: 1.38
PV PEAK D VEL: 0.45 M/S
PV PEAK S VEL: 0.62 M/S
RA MAJOR: 4.57 CM
RA PRESSURE: 3 MMHG
RA WIDTH: 3.63 CM
RIGHT VENTRICULAR END-DIASTOLIC DIMENSION: 3.79 CM
RV TISSUE DOPPLER FREE WALL SYSTOLIC VELOCITY 1 (APICAL 4 CHAMBER VIEW): 13.61 CM/S
SINUS: 2.94 CM
STJ: 2.35 CM
TDI LATERAL: 0.1 M/S
TDI SEPTAL: 0.09 M/S
TDI: 0.1 M/S
TR MAX PG: 21 MMHG
TRICUSPID ANNULAR PLANE SYSTOLIC EXCURSION: 2.63 CM
TV REST PULMONARY ARTERY PRESSURE: 24 MMHG

## 2021-12-27 PROCEDURE — 93306 ECHO (CUPID ONLY): ICD-10-PCS | Mod: 26,,, | Performed by: INTERNAL MEDICINE

## 2021-12-27 PROCEDURE — 93306 TTE W/DOPPLER COMPLETE: CPT

## 2021-12-27 PROCEDURE — 93306 TTE W/DOPPLER COMPLETE: CPT | Mod: 26,,, | Performed by: INTERNAL MEDICINE

## 2021-12-28 ENCOUNTER — LAB VISIT (OUTPATIENT)
Dept: LAB | Facility: HOSPITAL | Age: 46
End: 2021-12-28
Attending: INTERNAL MEDICINE
Payer: MEDICAID

## 2021-12-28 DIAGNOSIS — K31.9 GASTROPATHY: ICD-10-CM

## 2021-12-28 PROCEDURE — 87338 HPYLORI STOOL AG IA: CPT | Performed by: INTERNAL MEDICINE

## 2021-12-31 ENCOUNTER — PATIENT MESSAGE (OUTPATIENT)
Dept: GASTROENTEROLOGY | Facility: CLINIC | Age: 46
End: 2021-12-31
Payer: MEDICAID

## 2022-01-05 LAB
H PYLORI AG STL QL IA: NORMAL
SPECIMEN SOURCE: 5

## 2022-01-06 ENCOUNTER — TELEPHONE (OUTPATIENT)
Dept: GASTROENTEROLOGY | Facility: CLINIC | Age: 47
End: 2022-01-06
Payer: MEDICAID

## 2022-01-06 NOTE — TELEPHONE ENCOUNTER
Spoke with patient , results given as written by Dr. Mckeon  Pt verbalizes understadning and appreciates the call.  Thanks MA

## 2022-01-06 NOTE — TELEPHONE ENCOUNTER
----- Message from Keith Mckeon MD sent at 1/6/2022  8:17 AM CST -----  Stool H.pylori test is negative.

## 2022-01-12 ENCOUNTER — LAB VISIT (OUTPATIENT)
Dept: LAB | Facility: HOSPITAL | Age: 47
End: 2022-01-12
Attending: FAMILY MEDICINE
Payer: MEDICAID

## 2022-01-12 DIAGNOSIS — I10 HTN (HYPERTENSION), BENIGN: ICD-10-CM

## 2022-01-12 LAB
ANION GAP SERPL CALC-SCNC: 8 MMOL/L (ref 8–16)
BUN SERPL-MCNC: 15 MG/DL (ref 6–20)
CALCIUM SERPL-MCNC: 9.2 MG/DL (ref 8.7–10.5)
CHLORIDE SERPL-SCNC: 103 MMOL/L (ref 95–110)
CO2 SERPL-SCNC: 30 MMOL/L (ref 23–29)
CREAT SERPL-MCNC: 0.9 MG/DL (ref 0.5–1.4)
EST. GFR  (AFRICAN AMERICAN): >60 ML/MIN/1.73 M^2
EST. GFR  (NON AFRICAN AMERICAN): >60 ML/MIN/1.73 M^2
GLUCOSE SERPL-MCNC: 90 MG/DL (ref 70–110)
POTASSIUM SERPL-SCNC: 3.7 MMOL/L (ref 3.5–5.1)
SODIUM SERPL-SCNC: 141 MMOL/L (ref 136–145)

## 2022-01-12 PROCEDURE — 36415 COLL VENOUS BLD VENIPUNCTURE: CPT | Mod: PN | Performed by: FAMILY MEDICINE

## 2022-01-12 PROCEDURE — 80048 BASIC METABOLIC PNL TOTAL CA: CPT | Performed by: FAMILY MEDICINE

## 2022-01-15 ENCOUNTER — PATIENT MESSAGE (OUTPATIENT)
Dept: PRIMARY CARE CLINIC | Facility: CLINIC | Age: 47
End: 2022-01-15
Payer: MEDICAID

## 2022-01-18 DIAGNOSIS — I10 HTN (HYPERTENSION), BENIGN: ICD-10-CM

## 2022-01-18 RX ORDER — LOSARTAN POTASSIUM 100 MG/1
100 TABLET ORAL DAILY
Qty: 90 TABLET | Refills: 3 | Status: SHIPPED | OUTPATIENT
Start: 2022-01-18 | End: 2023-01-30

## 2022-01-18 NOTE — TELEPHONE ENCOUNTER
Care Due:                  Date            Visit Type   Department     Provider  --------------------------------------------------------------------------------                                             [facility]  Last Visit: 09-      None         CARE           Hiwot Shah  Next Visit: None Scheduled  None         None Found                                                            Last  Test          Frequency    Reason                     Performed    Due Date  --------------------------------------------------------------------------------    Lipid Panel.  12 months..  atorvastatin.............  Not Found    Overdue    Powered by [employer]. Reference number: [id number].   1/18/2022 9:29:26 AM CST

## 2022-03-09 ENCOUNTER — PATIENT MESSAGE (OUTPATIENT)
Dept: ADMINISTRATIVE | Facility: OTHER | Age: 47
End: 2022-03-09
Payer: MEDICAID

## 2022-03-31 NOTE — PROGRESS NOTES
"Subjective:      Patient ID: Rama Moy is a 41 y.o. female.    Chief Complaint: Other (grief)    Here today for  grief after her mom passed away August 26.  She is having difficulty sleeping and requesting medication.  She is having crying spells as her mother lives with her, had trach, she and her 21-year-old son completely cared for her.  She is upset because her siblings did not help out.  She mainly misses her mom.  She is not suicidal    No results found for: MICALBCREAT    Current Outpatient Prescriptions on File Prior to Visit   Medication Sig    atorvastatin (LIPITOR) 40 MG tablet Take 1 tablet (40 mg total) by mouth once daily.    losartan (COZAAR) 50 MG tablet TAKE 1 TABLET BY MOUTH ONCE DAILY    pantoprazole (PROTONIX) 40 MG tablet Take 1 tablet (40 mg total) by mouth once daily.    pregabalin (LYRICA) 100 MG capsule Take 100 mg by mouth 2 (two) times daily.     No current facility-administered medications on file prior to visit.      Past Medical History:   Diagnosis Date    Constipation     GI Dr Reece 2014    Grief 9/19/2017    HTN (hypertension), benign 3/10/2015    Hyperlipidemia     Hypertension     Knee pain, left 2012    Dr Reyes, injury at work    Thyroid disease      Past Surgical History:   Procedure Laterality Date    DILATION AND CURETTAGE OF UTERUS      KNEE ARTHROSCOPY      Eric MOHAMUD    KNEE SURGERY      SKIN GRAFT      left finger due to injury    TUBAL LIGATION       Social History     Social History Narrative    Surveillance, , 1 child, nonsmoker, nondrinker, GYN White     Family History   Problem Relation Age of Onset    Hypertension Mother     Diabetes Mother     COPD Mother     Breast cancer Neg Hx     Colon cancer Neg Hx     Ovarian cancer Neg Hx      Vitals:    09/19/17 1014   BP: 120/82   Pulse: 96   Temp: 98.1 °F (36.7 °C)   Weight: 121.2 kg (267 lb 3.2 oz)   Height: 5' 7" (1.702 m)   PainSc: 0-No pain     Objective:   Physical Exam "   Psychiatric: She has a normal mood and affect. Her behavior is normal. Judgment and thought content normal.   Crying but consolable     Assessment:     1. Grief    2. HTN (hypertension), benign      Plan:     Orders Placed This Encounter    trazodone (DESYREL) 50 MG tablet     I spent 10 minutes with patient, 75% of time spent counseling on medication options and side effects     ==============================================  FOR HIGH BLOOD PRESSURE (HYPERTENSION)-------------    Take your Blood pressure medication every day     Try to stop these things that can elevate blood pressuere: salt, caffeine, energy drinks, diet pills, sudafed, alcohol and birth control    DECREASE ALCOHOL CONSUMPTION    DECREASE SALT (fast foods, frozen, canned, processed foods, ham, turkey, fried foods, chips, crackers, etc) & drink 8 glasses of water a day with minimal caffeine ( 1 cup a day)   ==============================================      Patient Instructions   PSYCHIATRIST RECOMMENDATIONS:    APPS:  [place]        THERAPIST RECOMMENDATIONS:    Sherif Fuentes , [address] [place] [address] [phone] ( HOLISTIC, good with teenagers)  Jessica Mcculloughq - [phone],  [address] [place] [address]  Aparna Doyle, PhD - family & play therapist, [phone]  Quinn Billy, Full time at [employer], marriage & family counsellor  Dm Lopez - couples therapist  Collette Solorzano - [facility]  Caesar Krishnamurthy on Met Kobi Ling on [place]  Meli Hopkins  [address] [facility] - Phyllis Snaider, Joan Villalta,  Jayshree Nava [phone]  Shima Gomez - [phone]  Rachel Son (ayurvedic) - [phone] (focus on families, trauma)  Mariangel Santiago [address] [facility], [phone]  Luc Donnelly [phone] (focus on addiction)  [facility] - Brian Rober [phone](focus on addiction)  Ana Lilia Barrera [phone]  Mary Figueroa [phone]  Sherif Fuentes , [address] [place] [address] [phone] ( HOLISTIC,  Yes good with teenagers)  [facility], [phone]  Pretty Alexander (sexual relationship therapist), [address] [facility], [place] [address], [phone]  Dannielle Weir  [place], [phone], [address] at [facility], [address], Dr Iliana Pool, Social workers, Art therapy,  bodywork therapy, Conscious breathing , Alternative healing, Massage therapists, [email or web address]

## 2022-04-12 ENCOUNTER — TELEPHONE (OUTPATIENT)
Dept: PRIMARY CARE CLINIC | Facility: CLINIC | Age: 47
End: 2022-04-12
Payer: MEDICAID

## 2022-04-12 DIAGNOSIS — Z12.31 SCREENING MAMMOGRAM, ENCOUNTER FOR: Primary | ICD-10-CM

## 2022-04-12 DIAGNOSIS — I10 HTN (HYPERTENSION), BENIGN: ICD-10-CM

## 2022-04-15 ENCOUNTER — PATIENT MESSAGE (OUTPATIENT)
Dept: OBSTETRICS AND GYNECOLOGY | Facility: CLINIC | Age: 47
End: 2022-04-15
Payer: MEDICAID

## 2022-04-21 ENCOUNTER — OFFICE VISIT (OUTPATIENT)
Dept: OBSTETRICS AND GYNECOLOGY | Facility: CLINIC | Age: 47
End: 2022-04-21
Payer: MEDICAID

## 2022-04-21 VITALS
BODY MASS INDEX: 42.14 KG/M2 | HEIGHT: 67 IN | WEIGHT: 268.5 LBS | DIASTOLIC BLOOD PRESSURE: 74 MMHG | SYSTOLIC BLOOD PRESSURE: 124 MMHG

## 2022-04-21 DIAGNOSIS — R30.0 DYSURIA: ICD-10-CM

## 2022-04-21 DIAGNOSIS — R39.15 URINARY URGENCY: ICD-10-CM

## 2022-04-21 DIAGNOSIS — R35.0 URINARY FREQUENCY: Primary | ICD-10-CM

## 2022-04-21 PROCEDURE — 99999 PR PBB SHADOW E&M-EST. PATIENT-LVL III: CPT | Mod: PBBFAC,,, | Performed by: REGISTERED NURSE

## 2022-04-21 PROCEDURE — 4010F PR ACE/ARB THEARPY RXD/TAKEN: ICD-10-PCS | Mod: CPTII,,, | Performed by: REGISTERED NURSE

## 2022-04-21 PROCEDURE — 3078F DIAST BP <80 MM HG: CPT | Mod: CPTII,,, | Performed by: REGISTERED NURSE

## 2022-04-21 PROCEDURE — 3074F SYST BP LT 130 MM HG: CPT | Mod: CPTII,,, | Performed by: REGISTERED NURSE

## 2022-04-21 PROCEDURE — 99213 OFFICE O/P EST LOW 20 MIN: CPT | Mod: S$PBB,,, | Performed by: REGISTERED NURSE

## 2022-04-21 PROCEDURE — 3008F PR BODY MASS INDEX (BMI) DOCUMENTED: ICD-10-PCS | Mod: CPTII,,, | Performed by: REGISTERED NURSE

## 2022-04-21 PROCEDURE — 3078F PR MOST RECENT DIASTOLIC BLOOD PRESSURE < 80 MM HG: ICD-10-PCS | Mod: CPTII,,, | Performed by: REGISTERED NURSE

## 2022-04-21 PROCEDURE — 99213 PR OFFICE/OUTPT VISIT, EST, LEVL III, 20-29 MIN: ICD-10-PCS | Mod: S$PBB,,, | Performed by: REGISTERED NURSE

## 2022-04-21 PROCEDURE — 3074F PR MOST RECENT SYSTOLIC BLOOD PRESSURE < 130 MM HG: ICD-10-PCS | Mod: CPTII,,, | Performed by: REGISTERED NURSE

## 2022-04-21 PROCEDURE — 1160F RVW MEDS BY RX/DR IN RCRD: CPT | Mod: CPTII,,, | Performed by: REGISTERED NURSE

## 2022-04-21 PROCEDURE — 99213 OFFICE O/P EST LOW 20 MIN: CPT | Mod: PBBFAC | Performed by: REGISTERED NURSE

## 2022-04-21 PROCEDURE — 1159F MED LIST DOCD IN RCRD: CPT | Mod: CPTII,,, | Performed by: REGISTERED NURSE

## 2022-04-21 PROCEDURE — 4010F ACE/ARB THERAPY RXD/TAKEN: CPT | Mod: CPTII,,, | Performed by: REGISTERED NURSE

## 2022-04-21 PROCEDURE — 1159F PR MEDICATION LIST DOCUMENTED IN MEDICAL RECORD: ICD-10-PCS | Mod: CPTII,,, | Performed by: REGISTERED NURSE

## 2022-04-21 PROCEDURE — 1160F PR REVIEW ALL MEDS BY PRESCRIBER/CLIN PHARMACIST DOCUMENTED: ICD-10-PCS | Mod: CPTII,,, | Performed by: REGISTERED NURSE

## 2022-04-21 PROCEDURE — 99999 PR PBB SHADOW E&M-EST. PATIENT-LVL III: ICD-10-PCS | Mod: PBBFAC,,, | Performed by: REGISTERED NURSE

## 2022-04-21 PROCEDURE — 3008F BODY MASS INDEX DOCD: CPT | Mod: CPTII,,, | Performed by: REGISTERED NURSE

## 2022-04-21 RX ORDER — NITROFURANTOIN 25; 75 MG/1; MG/1
100 CAPSULE ORAL 2 TIMES DAILY
Qty: 10 CAPSULE | Refills: 0 | Status: SHIPPED | OUTPATIENT
Start: 2022-04-21 | End: 2022-04-26

## 2022-04-21 NOTE — PROGRESS NOTES
CC: Dysuria    HPI: Pt is a 46 y.o.  female who presents for evaluation of her UTI symptoms.   The patient presents today with dysuria, frequency, and urgency for the past week. She reports lower back pain. The patient denies flank pain, fever, nausea, vomiting, and hematuria. She denies frequent or recurrent UTIs. LMP: 5 months ago. Hx of BTL and irregular cycles.    ROS:  GENERAL: Feeling well overall. Denies fever or chills.   SKIN: Denies rash or lesions.   HEAD: Denies head injury or headache.   NODES: Denies enlarged lymph nodes.   CHEST: Denies chest pain or shortness of breath.   CARDIOVASCULAR: Denies palpitations or left sided chest pain.   ABDOMEN: No abdominal pain, constipation, diarrhea, nausea, vomiting or rectal bleeding.  URINARY: + dysuria, + burning on urination, + urinary frequency, + urinary urgency, + suprapubic tenderness; no hematuria.  REPRODUCTIVE: See HPI.     PE:   APPEARANCE: Well nourished, well developed, Black or  female in no acute distress.  PELVIS: Deferred  ABDOMEN: + suprapubic tenderness  MUSCULOSKELETAL: No CVA tenderness      Diagnosis:  1. Urinary frequency    2. Urinary urgency    3. Dysuria        Plan:     Orders Placed This Encounter    Urine culture    nitrofurantoin, macrocrystal-monohydrate, (MACROBID) 100 MG capsule       -UTI prevention including   a. perineal hygiene, wipe front to back,  b. drink water prior to intercourse and urinate afterwards and avoid certain positions which could increase likelyhood of UTIs,  c. establish regular bladder habits,   d. avoid vulvovaginal irritants,   e. increase fluids and avoid caffeine and alcohol;  -signs of pylenephritis and to go to the ED if develops fever, chills, n/v, back pain, worsening dyuria, hematuria;   -pelvic rest until symptoms resolve;  -Macrobid use and potential side effects;  -A.C.S. Pap and pelvic exam guidelines (pap every 3 years), recomendations for yearly mammogram;  -to follow up with  her PCP for other health maintenance.       Follow-up PRN no resolution of symptoms.        МАРИНА Russo

## 2022-04-25 ENCOUNTER — TELEPHONE (OUTPATIENT)
Dept: OBSTETRICS AND GYNECOLOGY | Facility: CLINIC | Age: 47
End: 2022-04-25
Payer: MEDICAID

## 2022-04-25 DIAGNOSIS — R35.0 URINARY FREQUENCY: Primary | ICD-10-CM

## 2022-04-26 ENCOUNTER — LAB VISIT (OUTPATIENT)
Dept: LAB | Facility: HOSPITAL | Age: 47
End: 2022-04-26
Attending: NURSE PRACTITIONER
Payer: MEDICAID

## 2022-04-26 DIAGNOSIS — R35.0 URINARY FREQUENCY: ICD-10-CM

## 2022-04-26 PROCEDURE — 87086 URINE CULTURE/COLONY COUNT: CPT | Performed by: NURSE PRACTITIONER

## 2022-04-28 LAB — BACTERIA UR CULT: NO GROWTH

## 2022-05-09 ENCOUNTER — PATIENT MESSAGE (OUTPATIENT)
Dept: PRIMARY CARE CLINIC | Facility: CLINIC | Age: 47
End: 2022-05-09
Payer: MEDICAID

## 2022-05-09 ENCOUNTER — LAB VISIT (OUTPATIENT)
Dept: LAB | Facility: HOSPITAL | Age: 47
End: 2022-05-09
Attending: FAMILY MEDICINE
Payer: MEDICAID

## 2022-05-09 DIAGNOSIS — E78.49 OTHER HYPERLIPIDEMIA: ICD-10-CM

## 2022-05-09 DIAGNOSIS — D50.8 IRON DEFICIENCY ANEMIA SECONDARY TO INADEQUATE DIETARY IRON INTAKE: ICD-10-CM

## 2022-05-09 DIAGNOSIS — I10 HTN (HYPERTENSION), BENIGN: ICD-10-CM

## 2022-05-09 LAB
ALBUMIN SERPL BCP-MCNC: 3.4 G/DL (ref 3.5–5.2)
ALP SERPL-CCNC: 89 U/L (ref 55–135)
ALT SERPL W/O P-5'-P-CCNC: 24 U/L (ref 10–44)
ANION GAP SERPL CALC-SCNC: 10 MMOL/L (ref 8–16)
AST SERPL-CCNC: 24 U/L (ref 10–40)
BASOPHILS # BLD AUTO: 0.07 K/UL (ref 0–0.2)
BASOPHILS NFR BLD: 0.9 % (ref 0–1.9)
BILIRUB SERPL-MCNC: 0.6 MG/DL (ref 0.1–1)
BUN SERPL-MCNC: 13 MG/DL (ref 6–20)
CALCIUM SERPL-MCNC: 9.2 MG/DL (ref 8.7–10.5)
CHLORIDE SERPL-SCNC: 103 MMOL/L (ref 95–110)
CHOLEST SERPL-MCNC: 307 MG/DL (ref 120–199)
CHOLEST/HDLC SERPL: 5.6 {RATIO} (ref 2–5)
CO2 SERPL-SCNC: 25 MMOL/L (ref 23–29)
CREAT SERPL-MCNC: 1 MG/DL (ref 0.5–1.4)
DIFFERENTIAL METHOD: ABNORMAL
EOSINOPHIL # BLD AUTO: 0.2 K/UL (ref 0–0.5)
EOSINOPHIL NFR BLD: 3.2 % (ref 0–8)
ERYTHROCYTE [DISTWIDTH] IN BLOOD BY AUTOMATED COUNT: 14.1 % (ref 11.5–14.5)
EST. GFR  (AFRICAN AMERICAN): >60 ML/MIN/1.73 M^2
EST. GFR  (NON AFRICAN AMERICAN): >60 ML/MIN/1.73 M^2
FERRITIN SERPL-MCNC: 57 NG/ML (ref 20–300)
GLUCOSE SERPL-MCNC: 96 MG/DL (ref 70–110)
HCT VFR BLD AUTO: 34.4 % (ref 37–48.5)
HDLC SERPL-MCNC: 55 MG/DL (ref 40–75)
HDLC SERPL: 17.9 % (ref 20–50)
HGB BLD-MCNC: 10.8 G/DL (ref 12–16)
IMM GRANULOCYTES # BLD AUTO: 0.01 K/UL (ref 0–0.04)
IMM GRANULOCYTES NFR BLD AUTO: 0.1 % (ref 0–0.5)
IRON SERPL-MCNC: 47 UG/DL (ref 30–160)
LDLC SERPL CALC-MCNC: 228.4 MG/DL (ref 63–159)
LYMPHOCYTES # BLD AUTO: 2.5 K/UL (ref 1–4.8)
LYMPHOCYTES NFR BLD: 32.8 % (ref 18–48)
MCH RBC QN AUTO: 27.8 PG (ref 27–31)
MCHC RBC AUTO-ENTMCNC: 31.4 G/DL (ref 32–36)
MCV RBC AUTO: 88 FL (ref 82–98)
MONOCYTES # BLD AUTO: 0.8 K/UL (ref 0.3–1)
MONOCYTES NFR BLD: 10.2 % (ref 4–15)
NEUTROPHILS # BLD AUTO: 4 K/UL (ref 1.8–7.7)
NEUTROPHILS NFR BLD: 52.8 % (ref 38–73)
NONHDLC SERPL-MCNC: 252 MG/DL
NRBC BLD-RTO: 0 /100 WBC
PLATELET # BLD AUTO: 363 K/UL (ref 150–450)
PMV BLD AUTO: 11.2 FL (ref 9.2–12.9)
POTASSIUM SERPL-SCNC: 3.5 MMOL/L (ref 3.5–5.1)
PROT SERPL-MCNC: 7.6 G/DL (ref 6–8.4)
RBC # BLD AUTO: 3.89 M/UL (ref 4–5.4)
SATURATED IRON: 15 % (ref 20–50)
SODIUM SERPL-SCNC: 138 MMOL/L (ref 136–145)
TOTAL IRON BINDING CAPACITY: 318 UG/DL (ref 250–450)
TRANSFERRIN SERPL-MCNC: 215 MG/DL (ref 200–375)
TRIGL SERPL-MCNC: 118 MG/DL (ref 30–150)
TSH SERPL DL<=0.005 MIU/L-ACNC: 2.94 UIU/ML (ref 0.4–4)
WBC # BLD AUTO: 7.54 K/UL (ref 3.9–12.7)

## 2022-05-09 PROCEDURE — 84443 ASSAY THYROID STIM HORMONE: CPT | Performed by: FAMILY MEDICINE

## 2022-05-09 PROCEDURE — 36415 COLL VENOUS BLD VENIPUNCTURE: CPT | Mod: PN | Performed by: FAMILY MEDICINE

## 2022-05-09 PROCEDURE — 82728 ASSAY OF FERRITIN: CPT | Performed by: FAMILY MEDICINE

## 2022-05-09 PROCEDURE — 80053 COMPREHEN METABOLIC PANEL: CPT | Performed by: FAMILY MEDICINE

## 2022-05-09 PROCEDURE — 84466 ASSAY OF TRANSFERRIN: CPT | Performed by: FAMILY MEDICINE

## 2022-05-09 PROCEDURE — 80061 LIPID PANEL: CPT | Performed by: FAMILY MEDICINE

## 2022-05-09 PROCEDURE — 85025 COMPLETE CBC W/AUTO DIFF WBC: CPT | Performed by: FAMILY MEDICINE

## 2022-05-11 ENCOUNTER — OFFICE VISIT (OUTPATIENT)
Dept: PRIMARY CARE CLINIC | Facility: CLINIC | Age: 47
End: 2022-05-11
Payer: MEDICAID

## 2022-05-11 VITALS
TEMPERATURE: 99 F | WEIGHT: 268.94 LBS | OXYGEN SATURATION: 97 % | HEART RATE: 116 BPM | HEIGHT: 67 IN | DIASTOLIC BLOOD PRESSURE: 70 MMHG | BODY MASS INDEX: 42.21 KG/M2 | SYSTOLIC BLOOD PRESSURE: 110 MMHG

## 2022-05-11 DIAGNOSIS — E78.2 MIXED HYPERLIPIDEMIA: ICD-10-CM

## 2022-05-11 DIAGNOSIS — I10 HTN (HYPERTENSION), BENIGN: ICD-10-CM

## 2022-05-11 DIAGNOSIS — K63.3: ICD-10-CM

## 2022-05-11 DIAGNOSIS — J06.9 UPPER RESPIRATORY TRACT INFECTION, UNSPECIFIED TYPE: ICD-10-CM

## 2022-05-11 DIAGNOSIS — D50.8 IRON DEFICIENCY ANEMIA SECONDARY TO INADEQUATE DIETARY IRON INTAKE: ICD-10-CM

## 2022-05-11 DIAGNOSIS — Z96.89 SPINAL CORD STIMULATOR STATUS: ICD-10-CM

## 2022-05-11 DIAGNOSIS — Z00.00 ROUTINE GENERAL MEDICAL EXAMINATION AT A HEALTH CARE FACILITY: Primary | ICD-10-CM

## 2022-05-11 PROBLEM — E78.01 FAMILIAL HYPERCHOLESTEROLEMIA: Status: RESOLVED | Noted: 2021-07-06 | Resolved: 2022-05-11

## 2022-05-11 PROBLEM — R10.13 EPIGASTRIC ABDOMINAL PAIN: Status: RESOLVED | Noted: 2021-07-27 | Resolved: 2022-05-11

## 2022-05-11 PROCEDURE — 3078F PR MOST RECENT DIASTOLIC BLOOD PRESSURE < 80 MM HG: ICD-10-PCS | Mod: CPTII,,, | Performed by: FAMILY MEDICINE

## 2022-05-11 PROCEDURE — 3078F DIAST BP <80 MM HG: CPT | Mod: CPTII,,, | Performed by: FAMILY MEDICINE

## 2022-05-11 PROCEDURE — 3074F PR MOST RECENT SYSTOLIC BLOOD PRESSURE < 130 MM HG: ICD-10-PCS | Mod: CPTII,,, | Performed by: FAMILY MEDICINE

## 2022-05-11 PROCEDURE — 99999 PR PBB SHADOW E&M-EST. PATIENT-LVL V: CPT | Mod: PBBFAC,,, | Performed by: FAMILY MEDICINE

## 2022-05-11 PROCEDURE — 4010F ACE/ARB THERAPY RXD/TAKEN: CPT | Mod: CPTII,,, | Performed by: FAMILY MEDICINE

## 2022-05-11 PROCEDURE — 1159F PR MEDICATION LIST DOCUMENTED IN MEDICAL RECORD: ICD-10-PCS | Mod: CPTII,,, | Performed by: FAMILY MEDICINE

## 2022-05-11 PROCEDURE — 3074F SYST BP LT 130 MM HG: CPT | Mod: CPTII,,, | Performed by: FAMILY MEDICINE

## 2022-05-11 PROCEDURE — 1160F RVW MEDS BY RX/DR IN RCRD: CPT | Mod: CPTII,,, | Performed by: FAMILY MEDICINE

## 2022-05-11 PROCEDURE — 99215 OFFICE O/P EST HI 40 MIN: CPT | Mod: PBBFAC,PN | Performed by: FAMILY MEDICINE

## 2022-05-11 PROCEDURE — 3008F PR BODY MASS INDEX (BMI) DOCUMENTED: ICD-10-PCS | Mod: CPTII,,, | Performed by: FAMILY MEDICINE

## 2022-05-11 PROCEDURE — 3008F BODY MASS INDEX DOCD: CPT | Mod: CPTII,,, | Performed by: FAMILY MEDICINE

## 2022-05-11 PROCEDURE — 99999 PR PBB SHADOW E&M-EST. PATIENT-LVL V: ICD-10-PCS | Mod: PBBFAC,,, | Performed by: FAMILY MEDICINE

## 2022-05-11 PROCEDURE — 1160F PR REVIEW ALL MEDS BY PRESCRIBER/CLIN PHARMACIST DOCUMENTED: ICD-10-PCS | Mod: CPTII,,, | Performed by: FAMILY MEDICINE

## 2022-05-11 PROCEDURE — 99396 PR PREVENTIVE VISIT,EST,40-64: ICD-10-PCS | Mod: S$PBB,,, | Performed by: FAMILY MEDICINE

## 2022-05-11 PROCEDURE — 1159F MED LIST DOCD IN RCRD: CPT | Mod: CPTII,,, | Performed by: FAMILY MEDICINE

## 2022-05-11 PROCEDURE — 4010F PR ACE/ARB THEARPY RXD/TAKEN: ICD-10-PCS | Mod: CPTII,,, | Performed by: FAMILY MEDICINE

## 2022-05-11 PROCEDURE — 99396 PREV VISIT EST AGE 40-64: CPT | Mod: S$PBB,,, | Performed by: FAMILY MEDICINE

## 2022-05-11 NOTE — PATIENT INSTRUCTIONS
"To ER if worse    Take  650mg  Tylenol (acetominophen) every 6 hours  x 5 days , to help with pain, fatigue, fever & to calm down your body's overwhelming immune response.     FLONASE twice a day    Plain Benadryl at night    Plain Claritin in the morning  =========    An UPPER RESPIRATORY ILLNESS is initially caused by a virus or allergies 95% of the time. The goal to help you feel better is drying up the drainage & stopping the cough so the virus can run it's course in about 10 days. If your drainage becomes more thick and worse after 7-10 days of trying the below  over the counter medications, please make an appointment for further evaluation    Nasal Saline: Tilt head back and squirt into nostril 2-3 times until you taste saline in back of throat. Spit, and blow nose. Do this 4 times, alternating right and left nostril.   Do this routine 2-3 times per day.       If you have THICK MUCOUS DRAINAGE from the NOSE or COUGHING UP THICK PHLEGM:  You can add a MUCOLYTIC like MUCINEX plain (guaifenesin) 1200mg twice a day    If your COUGH PERSISTS:  Nasal Spray - Ipratropium 4 times a day  Acetylcysteine reduces cough  You can add a COUGH SUPPRESSANT like DELSYM (dextromethorphan) twice a day    Zinc acetate or gluconate 80-92mg daily  helps with general symptoms    Lactobacillus Casei 200g daily fermented dairy product containing L. Casei  - lessened length of cold symptoms    For SORE THROAT , try: Gargle with warm salt water 2-3 times per day.     Drink lots of WATER until your urine is clear because all of the above medications can "dry you out" and cause constipation.     =====  Follow with GYN for female health & cancer prevention        ==============================  RECOMMENDATIONS FOR FEMALES  ==============================  Your #1 MEDICINE is DAILY EXERCISE - 15-20 minutes of huffing & puffing EVERY DAY.     Prevent the #1 cause of death- cardiovascular disease (HEART ATTACK & STROKE) by checking for normal " blood pressure, cholesterol, sugars, & by not smoking.     VACCINES: Yearly FLU shot, PNEUMONIA shot after 65,  SHINGLES shot after 50    COLON CANCER screening colonoscopy starting at 46 yo &  every 10 years (or Cologuard kit every 3 yrs) , repeat test sooner if POLYP is found    I recommend  high fiber (5 fresh fruits or vegetables daily), low fat diet and aerobic  exercise (huffing/ puffing/ sweating for 20 min straight at least 4 days a week)    Follow up yearly with mammogram, fasting lipids, CMP, CBC prior.   ==============================================================

## 2022-05-11 NOTE — PROGRESS NOTES
Subjective:      Patient ID: Rama Blair is a 46 y.o. female.    Chief Complaint: Annual Exam (Head cold,diarrhea,sore throat)    Here today for general exam.     Started Sat after son graduated fever 100.4, sore throat, diarrhea, body hurts. Coughing. Feels worse since Sat. No rash.     COVID RISK SCORE  2     high with lipitor 80    The 10-year ASCVD risk score (Laure WALSH Jr., et al., 2013) is: 2.3%    Values used to calculate the score:      Age: 46 years      Sex: Female      Is Non- : Yes      Diabetic: No      Tobacco smoker: No      Systolic Blood Pressure: 110 mmHg      Is BP treated: Yes      HDL Cholesterol: 55 mg/dL      Total Cholesterol: 307 mg/dL    ==============  12/22/21 saw GI  Impression: Upper abdominal pain- history of H.pylori gastritis. Acute colitis most likely drug induced (NSAID).     Recommendations:   1. Check Iron profile. Will discontinue Iron if no evidence of iron deficiency.  2. Pantoprazole 40 mg daily for 12 weeks. Avoid NSAIDs.  3. Follow up in 3 months.  ===============  For syncope in Dec, seen in ER 11/12/21, eval by Cardiology Dec 21  · The left ventricle is normal in size with normal systolic function.  · The estimated ejection fraction is 65%.  · Normal left ventricular diastolic function.  · Normal right ventricular size with normal right ventricular systolic function.  · Mild tricuspid regurgitation.  · Normal central venous pressure (3 mmHg).  · The estimated PA systolic pressure is 24 mmHg.      Denies any chest pain, shortness of breath, nausea vomiting constipation diarrhea, blood in stool, heartburn    Current Outpatient Medications   Medication Instructions    albuterol (PROVENTIL/VENTOLIN HFA) 90 mcg/actuation inhaler INHALE 2 PUFFS EVERY 6 HOURS AS NEEDED FOR WHEEZING RESCUE    atorvastatin (LIPITOR) 80 MG tablet TAKE 1 TABLET(80 MG) BY MOUTH EVERY DAY    blood pressure monitor (IHEALTH EASE) XL arm size (OP) Other, As needed  (PRN)    flu vacc [id number] 6mos up,PF, 60 mcg (15 mcg x 4)/0.5 mL Syrg take as directed    fluticasone propionate (FLONASE) 50 mcg, Each Nostril, 2 times daily PRN    hydroCHLOROthiazide (HYDRODIURIL) 25 MG tablet TAKE 1 TABLET(25 MG) BY MOUTH EVERY DAY    losartan (COZAAR) 100 mg, Oral, Daily    pantoprazole (PROTONIX) 40 MG tablet TAKE 1 TABLET(40 MG) BY MOUTH EVERY DAY    potassium chloride SA (KLOR-CON M15) 15 MEQ tablet 15 mEq, Oral, Daily    pregabalin (LYRICA) 100 mg, Oral, 2 times daily    triamcinolone acetonide 0.5% (KENALOG) 0.5 % Crea Topical (Top), 2 times daily       Lab Results   Component Value Date    HGBA1C 5.4 05/06/2015    HGBA1C 5.3 08/01/2012     No results found for: MICALBCREAT  Lab Results   Component Value Date    LDLCALC 228.4 (H) 05/09/2022    LDLCALC 211.0 (H) 01/14/2021    CHOL 307 (H) 05/09/2022    HDL 55 05/09/2022    TRIG 118 05/09/2022       Lab Results   Component Value Date     05/09/2022    K 3.5 05/09/2022     05/09/2022    CO2 25 05/09/2022    GLU 96 05/09/2022    BUN 13 05/09/2022    CREATININE 1.0 05/09/2022    CALCIUM 9.2 05/09/2022    PROT 7.6 05/09/2022    ALBUMIN 3.4 (L) 05/09/2022    BILITOT 0.6 05/09/2022    ALKPHOS 89 05/09/2022    AST 24 05/09/2022    ALT 24 05/09/2022    ANIONGAP 10 05/09/2022    ESTGFRAFRICA >60.0 05/09/2022    EGFRNONAA >60.0 05/09/2022    WBC 7.54 05/09/2022    HGB 10.8 (L) 05/09/2022    HGB 11.5 (L) 11/12/2021    HCT 34.4 (L) 05/09/2022    MCV 88 05/09/2022     05/09/2022    TSH 2.942 05/09/2022    HEPCAB Negative 01/14/2021       Lab Results   Component Value Date    SZSXYFAI30 984 (H) 08/20/2021    FERRITIN 57 05/09/2022    IRON 47 05/09/2022    TRANSFERRIN 215 05/09/2022    TIBC 318 05/09/2022    FESATURATED 15 (L) 05/09/2022         Past Medical History:   Diagnosis Date    Constipation     GI Dr Reece 2014    Elevated platelet count 3/11/2021    Refer hematol 3/2021    Gastritis 7/30/2021 2021     "Gastroesophageal reflux disease without esophagitis 8/28/2018    She states she had EGD in past    Grief 09/19/2017    mom passed 2017    HTN (hypertension), benign 03/10/2015    Digital med 12/20    Intestinal erosis 9/17/2021    CS 2021    Iron deficiency anemia secondary to inadequate dietary iron intake 8/26/2021    Start OTC 8/21    Knee pain, left 2012    scope, Dr Reyes, injury at work, treadmill helps with stiffness    Low blood potassium 07/30/2021    avoid HCTZ    Mixed hyperlipidemia 8/29/2013    Morbid obesity 02/23/2015    bariatrics not covered by insurance    Spinal cord stimulator status 08/28/2018    In back for L knee pain, Dr Kessler surgery & pain mgmt, Lyrica or Hydrocodone at night    Thyroid disease      Past Surgical History:   Procedure Laterality Date    COLONOSCOPY N/A 9/17/2021    Procedure: COLONOSCOPY;  Surgeon: Keith Mckeon MD;  Location: [facility] (4TH FLR);  Service: Endoscopy;  Laterality: N/A;  prep ins[place] emailed [place] [facility] [place]    DILATION AND CURETTAGE OF UTERUS      ESOPHAGOGASTRODUODENOSCOPY N/A 7/27/2021    Procedure: ESOPHAGOGASTRODUODENOSCOPY (EGD);  Surgeon: Keith Mckeon MD;  Location: [facility] (4TH FLR);  Service: Endoscopy;  Laterality: N/A;  COVID test 7/24 [facility]    KNEE ARTHROSCOPY      L, Dasa    KNEE SURGERY      SKIN GRAFT      left finger due to injury    SPINAL CORD STIMULATOR IMPLANT      to L knee, Dr Kessler    TUBAL LIGATION       Social History     Social History Narrative    Surveillance, , 1 child, nonsmoker, nondrinker, GYN White     Family History   Problem Relation Age of Onset    Hypertension Mother     Diabetes Mother     COPD Mother     Breast cancer Neg Hx     Colon cancer Neg Hx     Ovarian cancer Neg Hx      Vitals:    05/11/22 0951   BP: 110/70   Pulse: (!) 116   Temp: 98.6 °F (37 °C)   SpO2: 97%   Weight: 122 kg (268 lb 15.4 oz)   Height: 5' 7" (1.702 m)   PainSc:   3 "     Objective:   Physical Exam  Vitals and nursing note reviewed.   Constitutional:       General: She is not in acute distress.     Appearance: She is well-developed.   HENT:      Head: Normocephalic and atraumatic.      Right Ear: External ear normal. Tympanic membrane is erythematous and retracted.      Left Ear: External ear normal. Tympanic membrane is erythematous and retracted.      Nose: Mucosal edema and rhinorrhea present.      Right Sinus: No maxillary sinus tenderness or frontal sinus tenderness.      Left Sinus: No maxillary sinus tenderness or frontal sinus tenderness.      Comments: Wearing mask due to current COVID 19 pandemic, Nose & mouth exam deferred     Mouth/Throat:      Pharynx: Posterior oropharyngeal erythema present. No oropharyngeal exudate.      Comments: No ulcers, no exudate  Eyes:      Pupils: Pupils are equal, round, and reactive to light.   Neck:      Thyroid: No thyromegaly.   Cardiovascular:      Rate and Rhythm: Normal rate and regular rhythm.      Heart sounds: Normal heart sounds. No murmur heard.  Pulmonary:      Effort: Pulmonary effort is normal.      Breath sounds: Normal breath sounds. No wheezing or rales.   Abdominal:      General: Bowel sounds are normal. There is no distension.      Palpations: Abdomen is soft. There is no mass.      Tenderness: There is no abdominal tenderness. There is no guarding or rebound.   Musculoskeletal:      Cervical back: Normal range of motion and neck supple.   Lymphadenopathy:      Cervical: No cervical adenopathy.   Skin:     General: Skin is warm and dry.   Neurological:      Mental Status: She is alert and oriented to person, place, and time.   Psychiatric:         Behavior: Behavior normal.       Assessment:     1. Routine general medical examination at a health care facility    2. Upper respiratory tract infection, unspecified type    3. HTN (hypertension), benign    4. Mixed hyperlipidemia    5. Iron deficiency anemia secondary to  "inadequate dietary iron intake    6. Spinal cord stimulator status    7. Erosion of intestine      Plan:     Orders Placed This Encounter    Ambulatory referral/consult to Cardiology     Due to see GI w hx rectal erosions  & gastropathy. She will call for appt    Patient Instructions   To ER if worse    Take  650mg  Tylenol (acetominophen) every 6 hours  x 5 days , to help with pain, fatigue, fever & to calm down your body's overwhelming immune response.     FLONASE twice a day    Plain Benadryl at night    Plain Claritin in the morning  =========    An UPPER RESPIRATORY ILLNESS is initially caused by a virus or allergies 95% of the time. The goal to help you feel better is drying up the drainage & stopping the cough so the virus can run it's course in about 10 days. If your drainage becomes more thick and worse after 7-10 days of trying the below  over the counter medications, please make an appointment for further evaluation    Nasal Saline: Tilt head back and squirt into nostril 2-3 times until you taste saline in back of throat. Spit, and blow nose. Do this 4 times, alternating right and left nostril.   Do this routine 2-3 times per day.       If you have THICK MUCOUS DRAINAGE from the NOSE or COUGHING UP THICK PHLEGM:  You can add a MUCOLYTIC like MUCINEX plain (guaifenesin) 1200mg twice a day    If your COUGH PERSISTS:  Nasal Spray - Ipratropium 4 times a day  Acetylcysteine reduces cough  You can add a COUGH SUPPRESSANT like DELSYM (dextromethorphan) twice a day    Zinc acetate or gluconate 80-92mg daily  helps with general symptoms    Lactobacillus Casei 200g daily fermented dairy product containing L. Casei  - lessened length of cold symptoms    For SORE THROAT , try: Gargle with warm salt water 2-3 times per day.     Drink lots of WATER until your urine is clear because all of the above medications can "dry you out" and cause constipation.     =====  Follow with GYN for female health & cancer " prevention        ==============================  RECOMMENDATIONS FOR FEMALES  ==============================  Your #1 MEDICINE is DAILY EXERCISE - 15-20 minutes of huffing & puffing EVERY DAY.     Prevent the #1 cause of death- cardiovascular disease (HEART ATTACK & STROKE) by checking for normal blood pressure, cholesterol, sugars, & by not smoking.     VACCINES: Yearly FLU shot, PNEUMONIA shot after 65,  SHINGLES shot after 50    COLON CANCER screening colonoscopy starting at 44 yo &  every 10 years (or Cologuard kit every 3 yrs) , repeat test sooner if POLYP is found    I recommend  high fiber (5 fresh fruits or vegetables daily), low fat diet and aerobic  exercise (huffing/ puffing/ sweating for 20 min straight at least 4 days a week)    Follow up yearly with mammogram, fasting lipids, CMP, CBC prior.   ==============================================================

## 2022-05-14 ENCOUNTER — HOSPITAL ENCOUNTER (EMERGENCY)
Facility: OTHER | Age: 47
Discharge: HOME | End: 2022-05-14
Attending: EMERGENCY MEDICINE
Payer: MEDICAID

## 2022-05-14 VITALS
DIASTOLIC BLOOD PRESSURE: 78 MMHG | HEART RATE: 86 BPM | HEIGHT: 67 IN | SYSTOLIC BLOOD PRESSURE: 121 MMHG | OXYGEN SATURATION: 95 % | RESPIRATION RATE: 18 BRPM | BODY MASS INDEX: 42.13 KG/M2 | TEMPERATURE: 98 F

## 2022-05-14 DIAGNOSIS — J06.9 UPPER RESPIRATORY TRACT INFECTION, UNSPECIFIED TYPE: Primary | ICD-10-CM

## 2022-05-14 LAB
CTP QC/QA: YES
GROUP A STREP, MOLECULAR: NEGATIVE
POC MOLECULAR INFLUENZA A AGN: NEGATIVE
POC MOLECULAR INFLUENZA B AGN: NEGATIVE
SARS-COV-2 RDRP RESP QL NAA+PROBE: NEGATIVE
SARS-COV-2 RDRP RESP QL NAA+PROBE: NEGATIVE

## 2022-05-14 PROCEDURE — U0002 COVID-19 LAB TEST NON-CDC: HCPCS | Performed by: EMERGENCY MEDICINE

## 2022-05-14 PROCEDURE — 87651 STREP A DNA AMP PROBE: CPT | Performed by: NURSE PRACTITIONER

## 2022-05-14 PROCEDURE — 96372 THER/PROPH/DIAG INJ SC/IM: CPT | Performed by: NURSE PRACTITIONER

## 2022-05-14 PROCEDURE — 63600175 PHARM REV CODE 636 W HCPCS: Performed by: NURSE PRACTITIONER

## 2022-05-14 PROCEDURE — 99284 EMERGENCY DEPT VISIT MOD MDM: CPT | Mod: 25

## 2022-05-14 PROCEDURE — U0002 COVID-19 LAB TEST NON-CDC: HCPCS | Performed by: NURSE PRACTITIONER

## 2022-05-14 RX ORDER — NAPROXEN 375 MG/1
375 TABLET ORAL 2 TIMES DAILY WITH MEALS
Qty: 60 TABLET | Refills: 0 | OUTPATIENT
Start: 2022-05-14 | End: 2022-06-27

## 2022-05-14 RX ORDER — KETOROLAC TROMETHAMINE 30 MG/ML
30 INJECTION, SOLUTION INTRAMUSCULAR; INTRAVENOUS
Status: COMPLETED | OUTPATIENT
Start: 2022-05-14 | End: 2022-05-14

## 2022-05-14 RX ORDER — LOPERAMIDE HYDROCHLORIDE 2 MG/1
2 CAPSULE ORAL 4 TIMES DAILY PRN
Qty: 12 CAPSULE | Refills: 0 | Status: SHIPPED | OUTPATIENT
Start: 2022-05-14 | End: 2022-05-24

## 2022-05-14 RX ORDER — BENZONATATE 100 MG/1
100 CAPSULE ORAL 3 TIMES DAILY PRN
Qty: 20 CAPSULE | Refills: 0 | Status: SHIPPED | OUTPATIENT
Start: 2022-05-14 | End: 2022-05-24

## 2022-05-14 RX ORDER — DEXAMETHASONE SODIUM PHOSPHATE 4 MG/ML
8 INJECTION, SOLUTION INTRA-ARTICULAR; INTRALESIONAL; INTRAMUSCULAR; INTRAVENOUS; SOFT TISSUE
Status: COMPLETED | OUTPATIENT
Start: 2022-05-14 | End: 2022-05-14

## 2022-05-14 RX ORDER — GUAIFENESIN 600 MG/1
600 TABLET, EXTENDED RELEASE ORAL 2 TIMES DAILY
Qty: 20 TABLET | Refills: 0 | Status: SHIPPED | OUTPATIENT
Start: 2022-05-14 | End: 2022-05-24

## 2022-05-14 RX ADMIN — DEXAMETHASONE SODIUM PHOSPHATE 8 MG: 4 INJECTION INTRA-ARTICULAR; INTRALESIONAL; INTRAMUSCULAR; INTRAVENOUS; SOFT TISSUE at 04:05

## 2022-05-14 RX ADMIN — KETOROLAC TROMETHAMINE 30 MG: 30 INJECTION, SOLUTION INTRAMUSCULAR at 02:05

## 2022-05-14 NOTE — ED PROVIDER NOTES
Source of History:  Patient    Chief complaint:  Fever (Fever, cough, sore throat, body aches, joint pain, diarrhea onset Saturday. Pt saw PCP on Wednesday but no testing available, diagnosed with respiratory infection.  )      HPI:  Rama Blair is a 46 y.o. female presenting with complaints of body aches, cough, sore throat and diarrhea began Saturday.  Reports a fever of 101 yesterday.  Denies any recent sick contacts however she did recently go to a high school graduation.  Denies any chest pain or shortness of breath.    This is the extent to the patients complaints today here in the emergency department.    PMH:  As per HPI and below:  Past Medical History:   Diagnosis Date    Constipation     GI Dr Reece 2014    Elevated platelet count 3/11/2021    Refer hematol 3/2021    Gastritis 7/30/2021 2021    Gastroesophageal reflux disease without esophagitis 8/28/2018    She states she had EGD in past    Grief 09/19/2017    mom passed 2017    HTN (hypertension), benign 03/10/2015    Digital med 12/20    Intestinal erosis 9/17/2021    CS 2021    Iron deficiency anemia secondary to inadequate dietary iron intake 8/26/2021    Start OTC 8/21    Knee pain, left 2012    scope, Dr Reyes, injury at work, treadmill helps with stiffness    Low blood potassium 07/30/2021    avoid HCTZ    Mixed hyperlipidemia 8/29/2013    Morbid obesity 02/23/2015    bariatrics not covered by insurance    Spinal cord stimulator status 08/28/2018    In back for L knee pain, Dr Kessler surgery & pain mgmt, Lyrica or Hydrocodone at night    Thyroid disease      Past Surgical History:   Procedure Laterality Date    COLONOSCOPY N/A 9/17/2021    Procedure: COLONOSCOPY;  Surgeon: Keith Mckeon MD;  Location: [facility] ([address] [place] [address]);  Service: Endoscopy;  Laterality: N/A;  prep ins. emailed [place] [facility] [place]    DILATION AND CURETTAGE OF UTERUS      ESOPHAGOGASTRODUODENOSCOPY N/A 7/27/2021    Procedure:  "ESOPHAGOGASTRODUODENOSCOPY (EGD);  Surgeon: Keith Mckeon MD;  Location: [facility] (4TH FLR);  Service: Endoscopy;  Laterality: N/A;  COVID test 7/24 [facility]-    KNEE ARTHROSCOPY      L, Dasa    KNEE SURGERY      SKIN GRAFT      left finger due to injury    SPINAL CORD STIMULATOR IMPLANT      to L knee, Dr Kessler    TUBAL LIGATION         Social History     Tobacco Use    Smoking status: Never Smoker    Smokeless tobacco: Never Used   Substance Use Topics    Alcohol use: No    Drug use: No     Review of patient's allergies indicates:  No Known Allergies    ROS: As per HPI and below:  General:  Fever, body.  Eyes: No visual changes.  ENT:  Sore throat, cough  Head:  No headache.    Chest:  No shortness of breath.  Cardiovascular: No chest pain.  Abdomen: No abdominal pain.  No nausea or vomiting.  Diarrhea  Genito-Urinary: No abnormal urination.  Neurologic: No focal weakness.  No numbness.  MSK: no back pain.  Integument: No rashes or lesions.  Hematologic: No easy bruising.  Endocrine: No excessive thirst or urination.    Physical Exam:    /78 (BP Location: Right arm, Patient Position: Sitting)   Pulse 86   Temp 98.4 °F (36.9 °C) (Oral)   Resp 18   Ht 5' 7" (1.702 m)   LMP 05/19/2021 (Approximate)   SpO2 95%   BMI 42.13 kg/m²   Vitals:    05/14/22 1359 05/14/22 1703   BP: (!) 127/94 121/78   Pulse: 93 86   Resp: 17 18   Temp: 98.5 °F (36.9 °C) 98.4 °F (36.9 °C)   TempSrc: Oral Oral   SpO2: 97% 95%   Height: 5' 7" (1.702 m)        Nursing note and vital signs reviewed.  Appearance: No acute distress.  Well-appearing, nontoxic  Eyes:  No conjunctival injection.  Extraocular muscles are intact.  ENT: Oropharynx clear. No tonsillar exudate or swelling. Uvula midline and normal. No elevation of posterior oropharynx.  Nasal mucosal edema  Lymph:  No cervical lymphadenopathy  Chest/ Respiratory:  Clear to auscultation bilaterally.  Good air movement.  No wheezes.  No rhonchi. No rales. No accessory " muscle use.  Cardiovascular:  Regular rate and rhythm.  No murmurs. No gallops. No rubs.  Musculoskeletal: Neck supple.  No meningismus.  Skin: No rashes seen.  Good turgor.  No abrasions.  No ecchymoses.  Neuro: alert and oriented x3,  no focal neurological deficits.  Psych: Appropriate, conversant    Labs that have been ordered have been independently reviewed and interpreted by myself.  Labs Reviewed   GROUP A STREP, MOLECULAR   SARS-COV-2 RDRP GENE   POCT INFLUENZA A/B MOLECULAR   SARS-COV-2 RDRP GENE       No orders to display         Initial Impression/ Differential Dx:  Differential Diagnosis includes, but is not limited to:  meningitis, nasal foreign body, otitis media/external, bacterial sinusitis, allergic rhinitis, influenza, bacterial/viral pharyngitis, bacterial/viral pneumonia, covid-19      MDM:    46 y.o. female with URI symptoms that began last week.  Was evaluated wire PCP diagnosed with viral infection.  Presented with persistent symptoms.  Negative COVID, flu and strep.  After complete evaluation, including thorough history and physical exam, the patient's symptoms are most likely due to viral upper respiratory infection. There are no concerning features on physical exam to suggest bacterial otitis media/externa, sinusitis, pharyngitis, or peritonsillar abscess. Vital signs do not suggest sepsis. Lung sounds are clear and not consistent with pneumonia. There is no neck pain or limited ROM to suggest retropharyngeal abscess or meningitis. The patient will be treated with supportive care.             Diagnostic Impression:    1. Upper respiratory tract infection, unspecified type         ED Disposition Condition    Discharge Stable          ED Prescriptions     Medication Sig Dispense Start Date End Date Auth. Provider    naproxen (NAPROSYN) 375 MG tablet Take 1 tablet (375 mg total) by mouth 2 (two) times daily with meals. 60 tablet 5/14/2022  Blanka Brothers, DUP    guaiFENesin (MUCINEX) 600 mg 12  hr tablet Take 1 tablet (600 mg total) by mouth 2 (two) times daily. for 10 days 20 tablet 5/14/2022 5/24/2022 NUPUR More    benzonatate (TESSALON) 100 MG capsule Take 1 capsule (100 mg total) by mouth 3 (three) times daily as needed for Cough. 20 capsule 5/14/2022 5/24/2022 NUPUR More    loperamide (IMODIUM) 2 mg capsule Take 1 capsule (2 mg total) by mouth 4 (four) times daily as needed for Diarrhea. 12 capsule 5/14/2022 5/24/2022 NUPUR More        Follow-up Information     Follow up With Specialties Details Why Contact Info    Hiwot Shah MD Family Medicine Schedule an appointment as soon as possible for a visit in 3 days  [address]  [address]  [facility] [address]  [phone]      [facility] - Emergency Dept (Observation) Emergency Medicine Go to  If symptoms worsen [address] [facility] [id number] [phone]             NUPUR More  05/14/22 0271

## 2022-05-14 NOTE — Clinical Note
"Rama Northjamil Blair was seen and treated in our emergency department on 5/14/2022.  She may return to work on 05/17/2022.       If you have any questions or concerns, please don't hesitate to call.      NUPUR More"

## 2022-05-22 ENCOUNTER — PATIENT MESSAGE (OUTPATIENT)
Dept: ADMINISTRATIVE | Facility: OTHER | Age: 47
End: 2022-05-22
Payer: MEDICAID

## 2022-05-23 ENCOUNTER — LAB VISIT (OUTPATIENT)
Dept: LAB | Facility: HOSPITAL | Age: 47
End: 2022-05-23
Attending: FAMILY MEDICINE
Payer: MEDICAID

## 2022-05-23 DIAGNOSIS — E78.2 MIXED HYPERLIPIDEMIA: ICD-10-CM

## 2022-05-23 LAB
ALBUMIN SERPL BCP-MCNC: 3.3 G/DL (ref 3.5–5.2)
ALP SERPL-CCNC: 80 U/L (ref 55–135)
ALT SERPL W/O P-5'-P-CCNC: 10 U/L (ref 10–44)
AST SERPL-CCNC: 13 U/L (ref 10–40)
BILIRUB DIRECT SERPL-MCNC: 0.2 MG/DL (ref 0.1–0.3)
BILIRUB SERPL-MCNC: 0.4 MG/DL (ref 0.1–1)
PROT SERPL-MCNC: 7 G/DL (ref 6–8.4)

## 2022-05-23 PROCEDURE — 80076 HEPATIC FUNCTION PANEL: CPT | Performed by: FAMILY MEDICINE

## 2022-05-23 PROCEDURE — 36415 COLL VENOUS BLD VENIPUNCTURE: CPT | Mod: PN | Performed by: FAMILY MEDICINE

## 2022-05-24 ENCOUNTER — LAB VISIT (OUTPATIENT)
Dept: LAB | Facility: OTHER | Age: 47
End: 2022-05-24
Attending: OBSTETRICS & GYNECOLOGY
Payer: MEDICAID

## 2022-05-24 ENCOUNTER — OFFICE VISIT (OUTPATIENT)
Dept: OBSTETRICS AND GYNECOLOGY | Facility: CLINIC | Age: 47
End: 2022-05-24
Attending: OBSTETRICS & GYNECOLOGY
Payer: MEDICAID

## 2022-05-24 VITALS
SYSTOLIC BLOOD PRESSURE: 118 MMHG | HEIGHT: 67 IN | DIASTOLIC BLOOD PRESSURE: 72 MMHG | BODY MASS INDEX: 42.91 KG/M2 | WEIGHT: 273.38 LBS

## 2022-05-24 DIAGNOSIS — Z01.419 WELL WOMAN EXAM WITH ROUTINE GYNECOLOGICAL EXAM: Primary | ICD-10-CM

## 2022-05-24 DIAGNOSIS — R63.5 WEIGHT GAIN: ICD-10-CM

## 2022-05-24 DIAGNOSIS — Z12.31 VISIT FOR SCREENING MAMMOGRAM: ICD-10-CM

## 2022-05-24 PROBLEM — K63.3: Status: RESOLVED | Noted: 2021-09-17 | Resolved: 2022-05-24

## 2022-05-24 PROBLEM — K29.70 GASTRITIS: Status: RESOLVED | Noted: 2021-07-30 | Resolved: 2022-05-24

## 2022-05-24 PROBLEM — Z12.11 SCREENING FOR COLON CANCER: Status: RESOLVED | Noted: 2021-09-17 | Resolved: 2022-05-24

## 2022-05-24 PROBLEM — F43.21 GRIEF: Status: RESOLVED | Noted: 2017-09-19 | Resolved: 2022-05-24

## 2022-05-24 PROBLEM — E87.6 LOW BLOOD POTASSIUM: Status: RESOLVED | Noted: 2021-07-30 | Resolved: 2022-05-24

## 2022-05-24 PROBLEM — D75.839 THROMBOCYTOSIS: Status: RESOLVED | Noted: 2021-03-11 | Resolved: 2022-05-24

## 2022-05-24 LAB
ESTIMATED AVG GLUCOSE: 111 MG/DL (ref 68–131)
HBA1C MFR BLD: 5.5 % (ref 4–5.6)

## 2022-05-24 PROCEDURE — 4010F PR ACE/ARB THEARPY RXD/TAKEN: ICD-10-PCS | Mod: CPTII,,, | Performed by: OBSTETRICS & GYNECOLOGY

## 2022-05-24 PROCEDURE — 88175 CYTOPATH C/V AUTO FLUID REDO: CPT | Performed by: OBSTETRICS & GYNECOLOGY

## 2022-05-24 PROCEDURE — 87624 HPV HI-RISK TYP POOLED RSLT: CPT | Performed by: OBSTETRICS & GYNECOLOGY

## 2022-05-24 PROCEDURE — 99213 OFFICE O/P EST LOW 20 MIN: CPT | Mod: PBBFAC,PN | Performed by: OBSTETRICS & GYNECOLOGY

## 2022-05-24 PROCEDURE — 4010F ACE/ARB THERAPY RXD/TAKEN: CPT | Mod: CPTII,,, | Performed by: OBSTETRICS & GYNECOLOGY

## 2022-05-24 PROCEDURE — 99396 PREV VISIT EST AGE 40-64: CPT | Mod: S$PBB,,, | Performed by: OBSTETRICS & GYNECOLOGY

## 2022-05-24 PROCEDURE — 99999 PR PBB SHADOW E&M-EST. PATIENT-LVL III: CPT | Mod: PBBFAC,,, | Performed by: OBSTETRICS & GYNECOLOGY

## 2022-05-24 PROCEDURE — 3074F PR MOST RECENT SYSTOLIC BLOOD PRESSURE < 130 MM HG: ICD-10-PCS | Mod: CPTII,,, | Performed by: OBSTETRICS & GYNECOLOGY

## 2022-05-24 PROCEDURE — 3078F PR MOST RECENT DIASTOLIC BLOOD PRESSURE < 80 MM HG: ICD-10-PCS | Mod: CPTII,,, | Performed by: OBSTETRICS & GYNECOLOGY

## 2022-05-24 PROCEDURE — 3008F PR BODY MASS INDEX (BMI) DOCUMENTED: ICD-10-PCS | Mod: CPTII,,, | Performed by: OBSTETRICS & GYNECOLOGY

## 2022-05-24 PROCEDURE — 99999 PR PBB SHADOW E&M-EST. PATIENT-LVL III: ICD-10-PCS | Mod: PBBFAC,,, | Performed by: OBSTETRICS & GYNECOLOGY

## 2022-05-24 PROCEDURE — 1160F RVW MEDS BY RX/DR IN RCRD: CPT | Mod: CPTII,,, | Performed by: OBSTETRICS & GYNECOLOGY

## 2022-05-24 PROCEDURE — 3008F BODY MASS INDEX DOCD: CPT | Mod: CPTII,,, | Performed by: OBSTETRICS & GYNECOLOGY

## 2022-05-24 PROCEDURE — 3074F SYST BP LT 130 MM HG: CPT | Mod: CPTII,,, | Performed by: OBSTETRICS & GYNECOLOGY

## 2022-05-24 PROCEDURE — 36415 COLL VENOUS BLD VENIPUNCTURE: CPT | Performed by: OBSTETRICS & GYNECOLOGY

## 2022-05-24 PROCEDURE — 1159F PR MEDICATION LIST DOCUMENTED IN MEDICAL RECORD: ICD-10-PCS | Mod: CPTII,,, | Performed by: OBSTETRICS & GYNECOLOGY

## 2022-05-24 PROCEDURE — 1160F PR REVIEW ALL MEDS BY PRESCRIBER/CLIN PHARMACIST DOCUMENTED: ICD-10-PCS | Mod: CPTII,,, | Performed by: OBSTETRICS & GYNECOLOGY

## 2022-05-24 PROCEDURE — 3078F DIAST BP <80 MM HG: CPT | Mod: CPTII,,, | Performed by: OBSTETRICS & GYNECOLOGY

## 2022-05-24 PROCEDURE — 99396 PR PREVENTIVE VISIT,EST,40-64: ICD-10-PCS | Mod: S$PBB,,, | Performed by: OBSTETRICS & GYNECOLOGY

## 2022-05-24 PROCEDURE — 83036 HEMOGLOBIN GLYCOSYLATED A1C: CPT | Performed by: OBSTETRICS & GYNECOLOGY

## 2022-05-24 PROCEDURE — 1159F MED LIST DOCD IN RCRD: CPT | Mod: CPTII,,, | Performed by: OBSTETRICS & GYNECOLOGY

## 2022-05-24 RX ORDER — POTASSIUM CITRATE 15 MEQ/1
1 TABLET, EXTENDED RELEASE ORAL DAILY
COMMUNITY
Start: 2022-05-17 | End: 2022-09-15

## 2022-05-24 NOTE — PROGRESS NOTES
SUBJECTIVE:   46 y.o. female   for annual routine Pap and checkup. Patient's last menstrual period was 2021 (approximate)..  She complains of difficulty losing weight.  She has had a recent normal TSH.        Past Medical History:   Diagnosis Date    Constipation     GI Dr Reece     Elevated platelet count 3/11/2021    Refer hematol 3/2021    Gastritis 2021    Gastroesophageal reflux disease without esophagitis 2018    She states she had EGD in past    Grief 2017    mom passed 2017    HTN (hypertension), benign 03/10/2015    Digital med     Intestinal erosis 2021    CS     Iron deficiency anemia secondary to inadequate dietary iron intake 2021    Start OTC     Knee pain, left     scope, Dr Reyes, injury at work, treadmill helps with stiffness    Low blood potassium 2021    avoid HCTZ    Mixed hyperlipidemia 2013    Morbid obesity 2015    bariatrics not covered by insurance    Spinal cord stimulator status 2018    In back for L knee pain, Dr Kessler surgery & pain mgmt, Lyrica or Hydrocodone at night    Thyroid disease      Past Surgical History:   Procedure Laterality Date    COLONOSCOPY N/A 2021    Procedure: COLONOSCOPY;  Surgeon: Keith Mckeon MD;  Location: [facility] (4TH FLR);  Service: Endoscopy;  Laterality: N/A;  prep ins. emailed / [place] [facility] [place]    DILATION AND CURETTAGE OF UTERUS      ESOPHAGOGASTRODUODENOSCOPY N/A 2021    Procedure: ESOPHAGOGASTRODUODENOSCOPY (EGD);  Surgeon: Keith Mckeon MD;  [facility]  Service: Endoscopy;  Laterality: N/A;  COVID test  [facility]    KNEE ARTHROSCOPY      Eric MOHAMUD    KNEE SURGERY      SKIN GRAFT      left finger due to injury    SPINAL CORD STIMULATOR IMPLANT      to L knee, Dr Kessler    TUBAL LIGATION       Social History     Socioeconomic History    Marital status:    Occupational  History    Occupation: works as survelliance agent   Tobacco Use    Smoking status: Never Smoker    Smokeless tobacco: Never Used   Substance and Sexual Activity    Alcohol use: No    Drug use: No    Sexual activity: Yes     Partners: Male     Birth control/protection: None   Social History Narrative    Surveillance, , 1 child, nonsmoker, nondrinker, GYN White     Family History   Problem Relation Age of Onset    Hypertension Mother     Diabetes Mother     COPD Mother     Breast cancer Neg Hx     Colon cancer Neg Hx     Ovarian cancer Neg Hx      OB History    Para Term  AB Living   2 1 1         SAB IAB Ectopic Multiple Live Births           1      # Outcome Date GA Lbr Vivek/2nd Weight Sex Delivery Anes PTL Lv   2             1 Term               Obstetric Comments   Pt states only pregnant one time;          Current Outpatient Medications   Medication Sig Dispense Refill    albuterol (PROVENTIL/VENTOLIN HFA) 90 mcg/actuation inhaler INHALE 2 PUFFS EVERY 6 HOURS AS NEEDED FOR WHEEZING RESCUE 18 g 0    atorvastatin (LIPITOR) 80 MG tablet TAKE 1 TABLET(80 MG) BY MOUTH EVERY DAY 90 tablet 0    benzonatate (TESSALON) 100 MG capsule Take 1 capsule (100 mg total) by mouth 3 (three) times daily as needed for Cough. 20 capsule 0    blood pressure monitor (IHEALTH EASE) XL arm size (OP) by Other route as needed for High Blood Pressure. 1 each 0    ezetimibe (ZETIA) 10 mg tablet Take 1 tablet (10 mg total) by mouth once daily. 90 tablet 3    hydroCHLOROthiazide (HYDRODIURIL) 25 MG tablet TAKE 1 TABLET(25 MG) BY MOUTH EVERY DAY 90 tablet 0    losartan (COZAAR) 100 MG tablet Take 1 tablet (100 mg total) by mouth once daily. 90 tablet 3    pantoprazole (PROTONIX) 40 MG tablet TAKE 1 TABLET(40 MG) BY MOUTH EVERY DAY 90 tablet 0    potassium chloride SA (KLOR-CON M15) 15 MEQ tablet Take 1 tablet (15 mEq total) by mouth once daily. 30 tablet 11    pregabalin (LYRICA) 100 MG capsule  Take 100 mg by mouth 2 (two) times daily.      triamcinolone acetonide 0.5% (KENALOG) 0.5 % Crea Apply topically 2 (two) times daily. 60 g 1    flu vacc [id number] 6mos up,PF, 60 mcg (15 mcg x 4)/0.5 mL Syrg take as directed (Patient not taking: Reported on 5/24/2022) 0.5 mL 0    fluticasone propionate (FLONASE) 50 mcg/actuation nasal spray 1 spray (50 mcg total) by Each Nostril route 2 (two) times daily as needed for Rhinitis. (Patient not taking: Reported on 5/24/2022) 15 g 0    guaiFENesin (MUCINEX) 600 mg 12 hr tablet Take 1 tablet (600 mg total) by mouth 2 (two) times daily. for 10 days (Patient not taking: Reported on 5/24/2022) 20 tablet 0    loperamide (IMODIUM) 2 mg capsule Take 1 capsule (2 mg total) by mouth 4 (four) times daily as needed for Diarrhea. (Patient not taking: Reported on 5/24/2022) 12 capsule 0    naproxen (NAPROSYN) 375 MG tablet Take 1 tablet (375 mg total) by mouth 2 (two) times daily with meals. (Patient not taking: Reported on 5/24/2022) 60 tablet 0    potassium citrate (UROCIT-K 15) 15 mEq TbSR Take 1 tablet by mouth once daily.       No current facility-administered medications for this visit.     Allergies: Patient has no known allergies.     ROS:  Constitutional: no weight loss, weight gain, fever, fatigue  Eyes:  No vision changes, glasses/contacts  ENT/Mouth: No ulcers, sinus problems, ears ringing, headache  Cardiovascular: No inability to lie flat, chest pain, exercise intolerance, swelling, heart palpitations  Respiratory: No wheezing, coughing blood, shortness of breath, or cough  Gastrointestinal: No diarrhea, bloody stool, nausea/vomiting, constipation, gas, hemorrhoids  Genitourinary: No blood in urine, painful urination, urgency of urination, frequency of urination, incomplete emptying, incontinence, abnormal bleeding, painful periods, heavy periods, vaginal discharge, vaginal odor, painful intercourse, sexual problems, bleeding after intercourse.  Musculoskeletal:  "No muscle weakness  Skin/Breast: No painful breasts, nipple discharge, masses, rash, ulcers  Neurological: No passing out, seizures, numbness, headache  Endocrine: No diabetes, hypothyroid, hyperthyroid, hot flashes, hair loss, abnormal hair growth, ance  Psychiatric: No depression, crying  Hematologic: No bruises, bleeding, swollen lymph nodes, anemia.      OBJECTIVE:   The patient appears well, alert, oriented x 3, in no distress.  /72   Ht 5' 7" (1.702 m)   Wt 124 kg (273 lb 5.9 oz)   LMP 05/19/2021 (Approximate)   BMI 42.82 kg/m²   NECK: no thyromegaly, trachea midline  SKIN: no acne, striae, hirsutism  BREAST EXAM: breasts appear normal, no suspicious masses, no skin or nipple changes or axillary nodes  ABDOMEN: no hernias, masses, or hepatosplenomegaly  GENITALIA: normal external genitalia, no erythema, no discharge  URETHRA: normal urethra, normal urethral meatus  VAGINA: Normal  CERVIX: no lesions or cervical motion tenderness  UTERUS: normal size, contour, position, consistency, mobility, non-tender  ADNEXA: normal adnexa and no mass, fullness, tenderness    \  ASSESSMENT:   Sia was seen today for well woman.    Diagnoses and all orders for this visit:    Well woman exam with routine gynecological exam  -     Liquid-Based Pap Smear, Screening  -     HPV High Risk Genotypes, PCR    Visit for screening mammogram  -     Mammo Digital Screening Bilat w/ Gen[facility]; Future    Weight gain  -     Hemoglobin A1C; Future        "

## 2022-05-27 LAB
HPV HR 12 DNA SPEC QL NAA+PROBE: NEGATIVE
HPV16 AG SPEC QL: NEGATIVE
HPV18 DNA SPEC QL NAA+PROBE: NEGATIVE

## 2022-05-29 LAB
FINAL PATHOLOGIC DIAGNOSIS: NORMAL
Lab: NORMAL

## 2022-06-27 ENCOUNTER — HOSPITAL ENCOUNTER (EMERGENCY)
Facility: OTHER | Age: 47
Discharge: HOME | End: 2022-06-27
Attending: EMERGENCY MEDICINE
Payer: MEDICAID

## 2022-06-27 VITALS
OXYGEN SATURATION: 98 % | BODY MASS INDEX: 40.81 KG/M2 | HEIGHT: 67 IN | TEMPERATURE: 98 F | HEART RATE: 71 BPM | WEIGHT: 260 LBS | RESPIRATION RATE: 18 BRPM | SYSTOLIC BLOOD PRESSURE: 121 MMHG | DIASTOLIC BLOOD PRESSURE: 69 MMHG

## 2022-06-27 DIAGNOSIS — R05.9 COUGH: ICD-10-CM

## 2022-06-27 DIAGNOSIS — U07.1 COVID-19: Primary | ICD-10-CM

## 2022-06-27 DIAGNOSIS — J18.9 PNEUMONIA DUE TO INFECTIOUS ORGANISM, UNSPECIFIED LATERALITY, UNSPECIFIED PART OF LUNG: ICD-10-CM

## 2022-06-27 DIAGNOSIS — R50.9 FEVER: ICD-10-CM

## 2022-06-27 DIAGNOSIS — U07.1 COVID-19 VIRUS DETECTED: ICD-10-CM

## 2022-06-27 DIAGNOSIS — R07.9 CHEST PAIN: ICD-10-CM

## 2022-06-27 LAB
ALBUMIN SERPL BCP-MCNC: 3.3 G/DL (ref 3.5–5.2)
ALP SERPL-CCNC: 67 U/L (ref 55–135)
ALT SERPL W/O P-5'-P-CCNC: 14 U/L (ref 10–44)
ANION GAP SERPL CALC-SCNC: 15 MMOL/L (ref 8–16)
AST SERPL-CCNC: 16 U/L (ref 10–40)
BASOPHILS # BLD AUTO: 0.03 K/UL (ref 0–0.2)
BASOPHILS NFR BLD: 0.5 % (ref 0–1.9)
BILIRUB SERPL-MCNC: 0.5 MG/DL (ref 0.1–1)
BNP SERPL-MCNC: <10 PG/ML (ref 0–99)
BUN SERPL-MCNC: 11 MG/DL (ref 6–20)
CALCIUM SERPL-MCNC: 8.9 MG/DL (ref 8.7–10.5)
CHLORIDE SERPL-SCNC: 100 MMOL/L (ref 95–110)
CO2 SERPL-SCNC: 21 MMOL/L (ref 23–29)
CREAT SERPL-MCNC: 0.8 MG/DL (ref 0.5–1.4)
CTP QC/QA: YES
D DIMER PPP IA.FEU-MCNC: 0.58 MG/L FEU
DIFFERENTIAL METHOD: ABNORMAL
EOSINOPHIL # BLD AUTO: 0.2 K/UL (ref 0–0.5)
EOSINOPHIL NFR BLD: 3.9 % (ref 0–8)
ERYTHROCYTE [DISTWIDTH] IN BLOOD BY AUTOMATED COUNT: 14.3 % (ref 11.5–14.5)
EST. GFR  (AFRICAN AMERICAN): >60 ML/MIN/1.73 M^2
EST. GFR  (NON AFRICAN AMERICAN): >60 ML/MIN/1.73 M^2
GLUCOSE SERPL-MCNC: 90 MG/DL (ref 70–110)
HCT VFR BLD AUTO: 35.4 % (ref 37–48.5)
HCV AB SERPL QL IA: NEGATIVE
HGB BLD-MCNC: 11.5 G/DL (ref 12–16)
HIV 1+2 AB+HIV1 P24 AG SERPL QL IA: NEGATIVE
IMM GRANULOCYTES # BLD AUTO: 0.03 K/UL (ref 0–0.04)
IMM GRANULOCYTES NFR BLD AUTO: 0.5 % (ref 0–0.5)
LYMPHOCYTES # BLD AUTO: 2.1 K/UL (ref 1–4.8)
LYMPHOCYTES NFR BLD: 36.7 % (ref 18–48)
MCH RBC QN AUTO: 28.7 PG (ref 27–31)
MCHC RBC AUTO-ENTMCNC: 32.5 G/DL (ref 32–36)
MCV RBC AUTO: 88 FL (ref 82–98)
MONOCYTES # BLD AUTO: 1 K/UL (ref 0.3–1)
MONOCYTES NFR BLD: 17.4 % (ref 4–15)
NEUTROPHILS # BLD AUTO: 2.3 K/UL (ref 1.8–7.7)
NEUTROPHILS NFR BLD: 41 % (ref 38–73)
NRBC BLD-RTO: 0 /100 WBC
PLATELET # BLD AUTO: 327 K/UL (ref 150–450)
PMV BLD AUTO: 9.7 FL (ref 9.2–12.9)
POTASSIUM SERPL-SCNC: 3 MMOL/L (ref 3.5–5.1)
PROT SERPL-MCNC: 7 G/DL (ref 6–8.4)
RBC # BLD AUTO: 4.01 M/UL (ref 4–5.4)
SARS-COV-2 RDRP RESP QL NAA+PROBE: POSITIVE
SODIUM SERPL-SCNC: 136 MMOL/L (ref 136–145)
TROPONIN I SERPL DL<=0.01 NG/ML-MCNC: 0.02 NG/ML (ref 0–0.03)
WBC # BLD AUTO: 5.59 K/UL (ref 3.9–12.7)

## 2022-06-27 PROCEDURE — 84484 ASSAY OF TROPONIN QUANT: CPT | Performed by: NURSE PRACTITIONER

## 2022-06-27 PROCEDURE — 93005 ELECTROCARDIOGRAM TRACING: CPT

## 2022-06-27 PROCEDURE — 80053 COMPREHEN METABOLIC PANEL: CPT | Performed by: NURSE PRACTITIONER

## 2022-06-27 PROCEDURE — 25500020 PHARM REV CODE 255: Performed by: EMERGENCY MEDICINE

## 2022-06-27 PROCEDURE — U0002 COVID-19 LAB TEST NON-CDC: HCPCS | Performed by: EMERGENCY MEDICINE

## 2022-06-27 PROCEDURE — 87389 HIV-1 AG W/HIV-1&-2 AB AG IA: CPT | Performed by: NURSE PRACTITIONER

## 2022-06-27 PROCEDURE — 83880 ASSAY OF NATRIURETIC PEPTIDE: CPT | Performed by: NURSE PRACTITIONER

## 2022-06-27 PROCEDURE — 85379 FIBRIN DEGRADATION QUANT: CPT | Performed by: NURSE PRACTITIONER

## 2022-06-27 PROCEDURE — 86803 HEPATITIS C AB TEST: CPT | Performed by: NURSE PRACTITIONER

## 2022-06-27 PROCEDURE — 85025 COMPLETE CBC W/AUTO DIFF WBC: CPT | Performed by: NURSE PRACTITIONER

## 2022-06-27 PROCEDURE — 93010 EKG 12-LEAD: ICD-10-PCS | Mod: ,,, | Performed by: INTERNAL MEDICINE

## 2022-06-27 PROCEDURE — 25000003 PHARM REV CODE 250: Performed by: NURSE PRACTITIONER

## 2022-06-27 PROCEDURE — 93010 ELECTROCARDIOGRAM REPORT: CPT | Mod: ,,, | Performed by: INTERNAL MEDICINE

## 2022-06-27 PROCEDURE — 99285 EMERGENCY DEPT VISIT HI MDM: CPT | Mod: 25

## 2022-06-27 RX ORDER — ACETAMINOPHEN 500 MG
1000 TABLET ORAL
Status: COMPLETED | OUTPATIENT
Start: 2022-06-27 | End: 2022-06-27

## 2022-06-27 RX ORDER — FLUTICASONE PROPIONATE 50 MCG
2 SPRAY, SUSPENSION (ML) NASAL DAILY
Qty: 9.9 ML | Refills: 0 | Status: SHIPPED | OUTPATIENT
Start: 2022-06-27 | End: 2022-08-30

## 2022-06-27 RX ORDER — AZITHROMYCIN 250 MG/1
250 TABLET, FILM COATED ORAL DAILY
Qty: 6 TABLET | Refills: 0 | Status: SHIPPED | OUTPATIENT
Start: 2022-06-27 | End: 2022-08-30

## 2022-06-27 RX ORDER — IBUPROFEN 600 MG/1
600 TABLET ORAL EVERY 6 HOURS PRN
Qty: 12 TABLET | Refills: 0 | Status: SHIPPED | OUTPATIENT
Start: 2022-06-27 | End: 2022-09-15

## 2022-06-27 RX ADMIN — IOHEXOL 100 ML: 350 INJECTION, SOLUTION INTRAVENOUS at 07:06

## 2022-06-27 RX ADMIN — ACETAMINOPHEN 1000 MG: 500 TABLET ORAL at 04:06

## 2022-06-27 NOTE — DISCHARGE INSTRUCTIONS
Hold atorvastatin while taking Paxlovid.     I think or know I had COVID-19.  Stay home for 5 days.  2.   You can exit quarantine after 5 days as long as your symptoms are improving.   3.   Continue to wear a mask around other for 5 additional days.   *Loss of taste and smell may persist for weeks or months after recovery and need not delay the end of isolation.    **Follow up with PCP in 24-48 hours symptoms do not improve as expected. Return to ER with worsening of symptoms.     **Over-the-counter Delsym as needed for cough.  Use caution taking other medications will taking Paxlovid.  Drink plenty fluids. Get plenty rest. Wash hands frequently.      Provider notified (name): Wei Coker DO  Reason for notification: pt requesting creon with meals and has questions regarding plan of care -- if GJT being exchanged.   Recommendation/request given to provider: please come see her to discuss POC when you have a chance.   Response from provider: MD called back stating GI will do ERCP and GJT exchange today -- pt back to NPO. Will come to update pt soon.

## 2022-06-27 NOTE — ED NOTES
S: Pt advises that she has been experiencing a sore throat with cough and congestion x 2 days, pt reports fever and fatigue. Pt denies nausea/vomting/diarrhea. States this is her first time having covid.    O: 46 YOF pt reports to ED with covid complaints, pt is aox4, abc's intact. Pt is ambulatory without assistance and speaks in full sentences without difficulty and is able to answer questions fully. Pt is laying supine on bed, bed in lowest position with wheels locked and side rails up x2. Will continue to monitor.

## 2022-06-27 NOTE — ED TRIAGE NOTES
Pt reports headache and fever with bodyaches for the past two days. She has nasal and chest congestion. She last took Ibuprofen one hour ago.

## 2022-06-27 NOTE — ED PROVIDER NOTES
Encounter Date: 6/27/2022       History     Chief Complaint   Patient presents with    COVID-19 Concerns    Fever     Pt c.o fever, cough with chest pain and body aches onset 2 days ago.  AAO x 3 nadn skin w.d pt took motrin pta.  Fever 100.2 at home.      Chief complaint:  URI    46-year-old female presents for evaluation of URI symptoms.  Symptoms include nasal congestion, runny nose, cough, sore throat, fever, generalized body aches, and chest congestion. Symptoms began 2 days ago. Fever at home 100.2. Reports mild intermittent SOB on ROS, but no respiratory distress noted. O2 sat WNL on room air.   No known sick contacts or exposure to COVID 19.   Took ibuprofen prior to arrival.    This is the extent of patient's complaints for this ER encounter.     The history is provided by the patient.     Review of patient's allergies indicates:  No Known Allergies  Past Medical History:   Diagnosis Date    Constipation     GI Dr Reece 2014    Elevated platelet count 3/11/2021    Refer hematol 3/2021    Gastritis 7/30/2021 2021    Gastroesophageal reflux disease without esophagitis 8/28/2018    She states she had EGD in past    Grief 09/19/2017    mom passed 2017    HTN (hypertension), benign 03/10/2015    Digital med 12/20    Intestinal erosis 9/17/2021    CS 2021    Iron deficiency anemia secondary to inadequate dietary iron intake 8/26/2021    Start OTC 8/21    Knee pain, left 2012    scope, Dr Reyes, injury at work, treadmill helps with stiffness    Low blood potassium 07/30/2021    avoid HCTZ    Mixed hyperlipidemia 8/29/2013    Morbid obesity 02/23/2015    bariatrics not covered by insurance    Spinal cord stimulator status 08/28/2018    In back for L knee pain, Dr Kessler surgery & pain mgmt, Lyrica or Hydrocodone at night    Thyroid disease      Past Surgical History:   Procedure Laterality Date    COLONOSCOPY N/A 9/17/2021    Procedure: COLONOSCOPY;  Surgeon: Keith Mckeon MD;  Location: [facility]  ENDO (4TH FLR);  Service: Endoscopy;  Laterality: N/A;  prep ins. emailed / COVID screening [facility] [place] [facility] 2[facility] 9/14/21- ER[facility]    DILATION AND CURETTAGE OF UTERUS      ESOPHAGOGASTRODUODENOSCOPY N/A 7/27/2021    Procedure: ESOPHAGOGASTRODUODENOSCOPY (EGD);  Surgeon: Keith Mckeon MD;  Location: [facility] ENDO (4TH FLR);  Service: Endoscopy;  Laterality: N/A;  COVID test 7/24 [facility]-    KNEE ARTHROSCOPY      L, Dasa    KNEE SURGERY      SKIN GRAFT      left finger due to injury    SPINAL CORD STIMULATOR IMPLANT      to L knee, Dr Kessler    TUBAL LIGATION       Family History   Problem Relation Age of Onset    Hypertension Mother     Diabetes Mother     COPD Mother     Breast cancer Neg Hx     Colon cancer Neg Hx     Ovarian cancer Neg Hx      Social History     Tobacco Use    Smoking status: Never Smoker    Smokeless tobacco: Never Used   Substance Use Topics    Alcohol use: No    Drug use: No     Review of Systems   Constitutional: Positive for fatigue and fever.   HENT: Positive for congestion, rhinorrhea and sore throat.    Respiratory: Positive for cough, chest tightness and shortness of breath (Intermittent, mild).    Cardiovascular: Negative for chest pain.   Gastrointestinal: Negative for abdominal pain.   Genitourinary: Negative for difficulty urinating.   Musculoskeletal: Positive for myalgias. Negative for arthralgias, back pain and neck pain.   Skin: Negative for rash and wound.   Neurological: Negative for headaches.   Psychiatric/Behavioral: Negative.        Physical Exam     Initial Vitals [06/27/22 1407]   BP Pulse Resp Temp SpO2   120/65 105 18 98.5 °F (36.9 °C) 98 %      MAP       --         Physical Exam    Vitals reviewed.  Constitutional: She is Obese . No distress.   HENT:   Head: Normocephalic and atraumatic.   Nose: Rhinorrhea present.   Mouth/Throat: Oropharynx is clear and moist and mucous membranes are normal.   Clear post nasal drip noted. Bilateral nasal mucosa with clear nasal  discharge noted.   Eyes: Conjunctivae, EOM and lids are normal. Right pupil is round. Left pupil is round. Pupils are equal.   Cardiovascular: Normal rate, regular rhythm and normal heart sounds.   Pulmonary/Chest: Effort normal and breath sounds normal. No respiratory distress.   Musculoskeletal:         General: Normal range of motion.     Neurological: She is alert and oriented to person, place, and time. She has normal strength.   Skin: Skin is warm and dry. No rash noted.   Psychiatric: She has a normal mood and affect. Her speech is normal and behavior is normal.         ED Course   Procedures  Labs Reviewed   CBC W/ AUTO DIFFERENTIAL - Abnormal; Notable for the following components:       Result Value    Hemoglobin 11.5 (*)     Hematocrit 35.4 (*)     Mono % 17.4 (*)     All other components within normal limits   COMPREHENSIVE METABOLIC PANEL - Abnormal; Notable for the following components:    Potassium 3.0 (*)     CO2 21 (*)     Albumin 3.3 (*)     All other components within normal limits    Narrative:     Release to patient->Immediate   D DIMER, QUANTITATIVE - Abnormal; Notable for the following components:    D-Dimer 0.58 (*)     All other components within normal limits    Narrative:     Release to patient->Immediate   SARS-COV-2 RDRP GENE - Abnormal; Notable for the following components:    POC Rapid COVID Positive (*)     All other components within normal limits    Narrative:     This test utilizes isothermal nucleic acid amplification   technology to detect the SARS-CoV-2 RdRp nucleic acid segment.   The analytical sensitivity (limit of detection) is 125 genome   equivalents/mL.   A POSITIVE result implies infection with the SARS-CoV-2 virus;   the patient is presumed to be contagious.     A NEGATIVE result means that SARS-CoV-2 nucleic acids are not   present above the limit of detection. A NEGATIVE result should be   treated as presumptive. It does not rule out the possibility of   COVID-19 and  should not be the sole basis for treatment decisions.   If COVID-19 is strongly suspected based on clinical and exposure   history, re-testing using an alternate molecular assay should be   considered.   This test is only for use under the Food and Drug   Administration s Emergency Use Authorization (EUA).   Commercial kits are provided by [employer].   Performance characteristics of the EUA have been independently   verified by [facility] Department of   Pathology and Laboratory Medicine.   _________________________________________________________________   The authorized Fact Sheet for Healthcare Providers and the authorized Fact   Sheet for Patients of the ID NOW COVID-19 are available on the FDA   website:     https://www.fda.gov/media/937331/download  https://www.fda.gov/media/789604/download           B-TYPE NATRIURETIC PEPTIDE   TROPONIN I    Narrative:     Release to patient->Immediate   HEPATITIS C ANTIBODY    Narrative:     Release to patient->Immediate   HIV 1 / 2 ANTIBODY    Narrative:     Release to patient->Immediate        ECG Results          EKG 12-lead (Final result)  Result time 06/28/22 10:22:05    Final result by Interface, Lab In [facility] (06/28/22 10:22:05)                 Narrative:    Test Reason : R07.9,    Vent. Rate : 098 BPM     Atrial Rate : 098 BPM     P-R Int : 152 ms          QRS Dur : 076 ms      QT Int : 338 ms       P-R-T Axes : 040 067 023 degrees     QTc Int : 431 ms    Normal sinus rhythm  Normal ECG    Confirmed by Paris CASTELLON, Dusty WRAY ([id number]) on 6/28/2022 10:21:55 AM    Referred By: [email or web address]   SELF           Confirmed By:Dusty Toledo MD                            Imaging Results          CTA Chest Non-Coronary (PE Study) (Final result)  Result time 06/27/22 20:57:16    Final result by Irma Diaz MD (06/27/22 20:57:16)                 Impression:      No evidence of acute pulmonary embolism. Small bilateral pleural effusions are present with  associated compressive atelectasis.    Hepatic steatosis.      Electronically signed by: Irma Diaz  Date:    06/27/2022  Time:    20:57             Narrative:    EXAMINATION:  CT PULMONARY ANGIOGRAM WITH CONTRAST    CLINICAL HISTORY:  Shortness of breath.    TECHNIQUE:  CT of the chest with intravenous contrast for pulmonary artery angiogram was performed. Contiguous axial 1.25 mm images followed by 10 mm reconstructions with multiplanar and MIP reformations of the pulmonary arteries. No 3D post-angiographic imaging was performed on an independent workstation and reviewed.  100 ml of Omnipaque 350 was injected.    COMPARISON:  None.    FINDINGS:  There is no evidence of pulmonary artery filling defect to suggest pulmonary embolism. There is no aortic aneurysm or aortic dissection.  Mild atherosclerotic changes are present.  Mild bibasilar atelectatic changes are present.    There is no evidence of mediastinal, hilar, or axillary adenopathy.    There are small bilateral pleural effusions with associated compressive atelectasis.  There is no pericardial effusion.    The heart size is within normal limits.    In the visualized upper abdomen, there is fatty infiltration of the liver.    Spondylitic changes are present.                               X-Ray Chest 1 View (Final result)  Result time 06/27/22 16:10:34    Final result by Yogi Best MD (06/27/22 16:10:34)                 Impression:      Questionable infiltrate in the right lower lobe may indicate pneumonia in the appropriate setting.      Electronically signed by: Yogi Best MD  Date:    06/27/2022  Time:    16:10             Narrative:    EXAMINATION:  XR CHEST 1 VIEW    CLINICAL HISTORY:  Cough, unspecified    TECHNIQUE:  Single frontal view of the chest was performed.    COMPARISON:  03/09/2021    FINDINGS:  Questionable infiltrate in the right lower lobe.The lungs are otherwise clear, with normal appearance of pulmonary vasculature and  "no pleural effusion or pneumothorax.    The cardiac silhouette is normal in size. The hilar and mediastinal contours are unremarkable.    Bones are intact.                                 Medications   acetaminophen tablet 1,000 mg (1,000 mg Oral Given 6/27/22 1602)   iohexoL (OMNIPAQUE 350) injection 100 mL (100 mLs Intravenous Given 6/27/22 1949)     Medical Decision Making:   Initial Assessment:   46-year-old female presents for evaluation of URI symptoms x2 days.  Clinical Tests:   Lab Tests: Ordered and Reviewed  ED Management:  COVID swab is positive.    Educated on home quarantine.    Additional MDM:   PERC Rule:   Age is greater than or equal to 50 = 0.0  Heart Rate is greater than or equal to 100 = 1.0  SaO2 on room air < 95% = 0.0  Unilateral leg swelling = 0.0  Hemoptysis = 0.0  Recent surgery or trauma = 0.0  Prior PE or DVT =  0.0  Hormone use = 0.00  PERC Score = 1    Well's Criteria Score:  -Clinical symptoms of DVT (leg swelling, pain with palpation) = 0.0  -Other diagnosis less likely than pulmonary embolism =            0.0  -Heart Rate >100 =   1.5  -Immobilization (= or > than 3 days) or surgery in the previous 4 weeks = 0.0  -Previous DVT/PE = 0.0  -Hemoptysis =          0.0  -Malignancy =           0.0  Well's Probability Score =    1.5                ED Course as of 06/28/22 1512   Mon Jun 27, 2022   1453 SARS-CoV-2 RNA, Amplification, Qual(!): Positive [EW]   1453 EKG:  Normal sinus rhythm.  Heart rate 98. No STEMI. Reviewed with Dr. Coker.  [EW]   1535 Walking saturation 97-99% on room air. Tolerated well per nursing staff.  [EW]   1628 "Questionable infiltrate in the right lower lobe may indicate pneumonia in the appropriate setting."    Discussed with Dr. Peña, additional work up ordered. Plan to DC home with zpak.  [EW]   1811 Physician Attestation Statement: I have reviewed this case with my non-physician provider. I have provided a face to face evaluation of this patient at the request " of my  non-physician provider. The patient's condition warranted physician involvement. The treatment regimen was reviewed by me.     Management decisions for this encounter made during COVID-19 public health emergency. Available resources, standards for appropriate emergency department evaluation, and admission vs. discharge standards have necessarily shifted and remain dynamic.   Pt is a 46 y.o. F with no reported PMH who presents with URI symptoms, fevers. No sick contacts.   Ddx included COVID19 infection, PNA, bronchitis, sepsis, acute coronary syndrome, acute pulmonary embolus.  [RC]   1816 Care assumed per Dr. Peña. [EW]   1901 D-Dimer(!): 0.58 [RC]   2100 I independently interpreted and reviewed the patient's CTA chest, notable for no evidence of acute pulmonary embolus, or other acute chest process.      --  I discussed with patient and/or guardian/caretaker that this evaluation in the ED does not suggest any emergent or life threatening medical condition requiring admission or further immediate intervention or diagnostics. Regardless, an unremarkable evaluation in the ED does not preclude the development or presence of a serious or life threatening condition. As such, patient was instructed to return for any worsening, new, changed, or concerning symptoms.     I had a detailed discussion with patient and/or guardian/caretaker regarding findings, plan, return precautions, importance of medication adherence, need to follow-up with a PCP. All questions answered.     Management decisions for this encounter made during COVID-19 public health emergency. Available resources, standards for appropriate emergency department evaluation, and admission vs. discharge standards have necessarily shifted and remain dynamic.     Note was created using voice recognition software. It may have occasional typographical errors not identified and edited despite initial review prior to signing.   [RC]      ED Course User  Index  [EW] Carmen Shell NP  [RC] Minh Peña MD             Clinical Impression:   Final diagnoses:  [R07.9] Chest pain  [R05.9] Cough  [R50.9] Fever  [U07.1] COVID-19 (Primary)  [J18.9] Pneumonia due to infectious organism, unspecified laterality, unspecified part of lung          ED Disposition Condition    Discharge Good        ED Prescriptions     Medication Sig Dispense Start Date End Date Auth. Provider    nirmatrelvir-ritonavir 150 mg x 2- 100 mg copackaged tablets (EUA) Take 3 tablets by mouth 2 (two) times daily for 5 days. Each dose contains 2 nirmatrelvir (pink tablets) and 1 ritonavir (white tablet). Take all 3 tablets together 30 tablet 6/27/2022 7/2/2022 Carmen Shell NP    fluticasone propionate (FLONASE) 50 mcg/actuation nasal spray 2 sprays (100 mcg total) by Each Nostril route once daily. 9.9 mL 6/27/2022  Carmen Shell NP    ibuprofen (ADVIL,MOTRIN) 600 MG tablet Take 1 tablet (600 mg total) by mouth every 6 (six) hours as needed for Pain. 12 tablet 6/27/2022  Carmen Shell NP    azithromycin (Z-TOM) 250 MG tablet Take 1 tablet (250 mg total) by mouth once daily. Take first 2 tablets together, then 1 every day until finished. 6 tablet 6/27/2022  Carmen Shell NP        Follow-up Information     Follow up With Specialties Details Why Contact Info    Hiwot Shah MD Family Medicine Schedule an appointment as soon as possible for a visit in 2 days  [address]  [address]  [facility] [address]  [phone]             Carmen Shell NP  06/28/22 1212       Minh Peña MD  06/28/22 1126

## 2022-06-29 ENCOUNTER — PATIENT MESSAGE (OUTPATIENT)
Dept: PRIMARY CARE CLINIC | Facility: CLINIC | Age: 47
End: 2022-06-29
Payer: MEDICAID

## 2022-06-30 ENCOUNTER — OFFICE VISIT (OUTPATIENT)
Dept: PRIMARY CARE CLINIC | Facility: CLINIC | Age: 47
End: 2022-06-30
Payer: MEDICAID

## 2022-06-30 ENCOUNTER — PATIENT MESSAGE (OUTPATIENT)
Dept: PRIMARY CARE CLINIC | Facility: CLINIC | Age: 47
End: 2022-06-30

## 2022-06-30 DIAGNOSIS — I10 HTN (HYPERTENSION), BENIGN: ICD-10-CM

## 2022-06-30 DIAGNOSIS — R05.9 COUGH: ICD-10-CM

## 2022-06-30 DIAGNOSIS — U07.1 COVID-19: Primary | ICD-10-CM

## 2022-06-30 DIAGNOSIS — Z96.89 SPINAL CORD STIMULATOR STATUS: ICD-10-CM

## 2022-06-30 PROCEDURE — 99213 OFFICE O/P EST LOW 20 MIN: CPT | Mod: 95,,, | Performed by: FAMILY MEDICINE

## 2022-06-30 PROCEDURE — 99213 PR OFFICE/OUTPT VISIT, EST, LEVL III, 20-29 MIN: ICD-10-PCS | Mod: 95,,, | Performed by: FAMILY MEDICINE

## 2022-06-30 NOTE — PROGRESS NOTES
Subjective:      Patient ID: Rama Blair is a 46 y.o. female.    Chief Complaint: No chief complaint on file.    The patient location is: home  The chief complaint leading to consultation is: COVID     Visit type: audiovisual    Face to Face time with patient:     15 minutes of total time spent on the encounter, which includes face to face time and non-face to face time preparing to see the patient (eg, review of tests), Obtaining and/or reviewing separately obtained history, Documenting clinical information in the electronic or other health record, Independently interpreting results (not separately reported) and communicating results to the patient/family/caregiver, or Care coordination (not separately reported).         Each patient to whom he or she provides medical services by telemedicine is:  (1) informed of the relationship between the physician and patient and the respective role of any other health care provider with respect to management of the patient; and (2) notified that he or she may decline to receive medical services by telemedicine and may withdraw from such care at any time.    Notes:       Saturday sx began, to ER Monday , dx with COVID pneumonia, tx w Paxlovid (told to stop statin while taking) , Zpack & albuterol. Feeling a little better. No fever. Sleeping ok. Decreased appetitie. Tired, sleeping.     K 3.0 after IVF. Also taking HCTZ 25    Travelling to [facility] July 2022    No flare up of back pain  Current Outpatient Medications   Medication Instructions    albuterol (PROVENTIL/VENTOLIN HFA) 90 mcg/actuation inhaler INHALE 2 PUFFS EVERY 6 HOURS AS NEEDED FOR WHEEZING RESCUE    atorvastatin (LIPITOR) 80 MG tablet TAKE 1 TABLET(80 MG) BY MOUTH EVERY DAY    azithromycin (Z-TOM) 250 mg, Oral, Daily, Take first 2 tablets together, then 1 every day until finished.    blood pressure monitor ([employer] EASE) XL arm size (OP) Other, As needed (PRN)    ezetimibe (ZETIA) 10 mg, Oral, Daily     fluticasone propionate (FLONASE) 100 mcg, Each Nostril, Daily    hydroCHLOROthiazide (HYDRODIURIL) 25 MG tablet TAKE 1 TABLET(25 MG) BY MOUTH EVERY DAY    ibuprofen (ADVIL,MOTRIN) 600 mg, Oral, Every 6 hours PRN    losartan (COZAAR) 100 mg, Oral, Daily    nirmatrelvir-ritonavir 150 mg x 2- 100 mg copackaged tablets (EUA) Take 3 tablets by mouth 2 (two) times daily for 5 days. Each dose contains 2 nirmatrelvir (pink tablets) and 1 ritonavir (white tablet). Take all 3 tablets together    pantoprazole (PROTONIX) 40 MG tablet TAKE 1 TABLET(40 MG) BY MOUTH EVERY DAY    potassium chloride SA (KLOR-CON M15) 15 MEQ tablet 15 mEq, Oral, Daily    potassium citrate (UROCIT-K 15) 15 mEq TbSR 1 tablet, Oral, Daily    pregabalin (LYRICA) 100 mg, Oral, 2 times daily    triamcinolone acetonide 0.5% (KENALOG) 0.5 % Crea Topical (Top), 2 times daily       Lab Results   Component Value Date    HGBA1C 5.5 05/24/2022    HGBA1C 5.4 05/06/2015    HGBA1C 5.3 08/01/2012     No results found for: MICALBCREAT  Lab Results   Component Value Date    LDLCALC 228.4 (H) 05/09/2022    LDLCALC 211.0 (H) 01/14/2021    CHOL 307 (H) 05/09/2022    HDL 55 05/09/2022    TRIG 118 05/09/2022       Lab Results   Component Value Date     06/27/2022    K 3.0 (L) 06/27/2022     06/27/2022    CO2 21 (L) 06/27/2022    GLU 90 06/27/2022    BUN 11 06/27/2022    CREATININE 0.8 06/27/2022    CALCIUM 8.9 06/27/2022    PROT 7.0 06/27/2022    ALBUMIN 3.3 (L) 06/27/2022    BILITOT 0.5 06/27/2022    ALKPHOS 67 06/27/2022    AST 16 06/27/2022    ALT 14 06/27/2022    ANIONGAP 15 06/27/2022    ESTGFRAFRICA >60 06/27/2022    EGFRNONAA >60 06/27/2022    WBC 5.59 06/27/2022    HGB 11.5 (L) 06/27/2022    HGB 10.8 (L) 05/09/2022    HCT 35.4 (L) 06/27/2022    MCV 88 06/27/2022     06/27/2022    TSH 2.942 05/09/2022    HEPCAB Negative 06/27/2022       Lab Results   Component Value Date    QVHJDJJB77 984 (H) 08/20/2021    FERRITIN 57 05/09/2022     IRON 47 05/09/2022    TRANSFERRIN 215 05/09/2022    TIBC 318 05/09/2022    FESATURATED 15 (L) 05/09/2022         Past Medical History:   Diagnosis Date    Constipation     GI Dr Reece 2014    Elevated platelet count 3/11/2021    Refer hematol 3/2021    Gastritis 7/30/2021 2021    Gastroesophageal reflux disease without esophagitis 8/28/2018    She states she had EGD in past    Grief 09/19/2017    mom passed 2017    HTN (hypertension), benign 03/10/2015    Digital med 12/20    Intestinal erosis 9/17/2021    CS 2021    Iron deficiency anemia secondary to inadequate dietary iron intake 8/26/2021    Start OTC 8/21    Knee pain, left 2012    scope, Dr Reyes, injury at work, treadmill helps with stiffness    Low blood potassium 07/30/2021    avoid HCTZ    Mixed hyperlipidemia 8/29/2013    Morbid obesity 02/23/2015    bariatrics not covered by insurance    Spinal cord stimulator status 08/28/2018    In back for L knee pain, Dr Kessler surgery & pain mgmt, Lyrica or Hydrocodone at night    Thyroid disease      Past Surgical History:   Procedure Laterality Date    COLONOSCOPY N/A 9/17/2021    Procedure: COLONOSCOPY;  Surgeon: Keith Mckeon MD;  Location: [facility] (4TH FLR);  Service: Endoscopy;  Laterality: N/A;  prep ins. emailed / COVID [place] [facility] [place]- [facility]    DILATION AND CURETTAGE OF UTERUS      ESOPHAGOGASTRODUODENOSCOPY N/A 7/27/2021    Procedure: ESOPHAGOGASTRODUODENOSCOPY (EGD);  Surgeon: Keith Mckeon MD;  Location: [facility] (4TH FLR);  Service: Endoscopy;  Laterality: N/A;  COVID test 7/24 [facility]-    KNEE ARTHROSCOPY      ONEIDA, Dasa    KNEE SURGERY      SKIN GRAFT      left finger due to injury    SPINAL CORD STIMULATOR IMPLANT      to L knee, Dr Kessler    TUBAL LIGATION       Social History     Social History Narrative    Surveillance, , 1 child, nonsmoker, nondrinker, GYN White     Family History   Problem Relation Age of Onset    Hypertension Mother      "Diabetes Mother     COPD Mother     Breast cancer Neg Hx     Colon cancer Neg Hx     Ovarian cancer Neg Hx      There were no vitals filed for this visit.  Objective:   Physical Exam  Assessment:     1. COVID-19    2. Cough    3. HTN (hypertension), benign    4. Spinal cord stimulator status      Plan:        Complete medication    Repeat K in the future    No need to repeat COVID test    Let me know if symptoms worsen or persist    =========    An UPPER RESPIRATORY ILLNESS is initially caused by a virus or allergies 95% of the time. The goal to help you feel better is drying up the drainage & stopping the cough so the virus can run it's course in about 10 days. If your drainage becomes more thick and worse after 7-10 days of trying the below  over the counter medications, please make an appointment for further evaluation    If you have POST NASAL DRIP , "RUNNY NOSE" or SORE THROAT FROM CLEARING YOUR THROAT :  Take an ANTIHISTAMINE  (Claritin or Zyrtec or Allegra ) AT NIGHT    Nasal Saline: Tilt head back and squirt into nostril 2-3 times until you taste saline in back of throat. Spit, and blow nose. Do this 4 times, alternating right and left nostril.   Do this routine 2-3 times per day.     NASAL SPRAY - Ipratropium , Nasacort (nasal steroid) or Flonase (nasal steroid)  Twice daily to decrease swelling & post nasal drip ------ very safe , works where the congestion is , & does not interfere with other medication or go through your blood stream. Consider prescription Ipratropium  Bromide nasal 0.03%      If you have THICK MUCOUS DRAINAGE from the NOSE or COUGHING UP THICK PHLEGM:  You can add a MUCOLYTIC like MUCINEX plain (guaifenesin) 1200mg twice a day    If your COUGH PERSISTS:  Nasal Spray - Ipratropium 4 times a day  Acetylcysteine reduces cough  You can add a COUGH SUPPRESSANT like DELSYM (dextromethorphan) twice a day    If you have PAIN, SORE THROAT, FEVER OR CHILLS:  You can ALTERNATE 250- 500 mg of  " "TYLENOL with  200-400 mg of IBUPROFEN every 3 hours - for the next 3 days  Discontinue and call me if  you have stomach upset    Zinc acetate or gluconate 80-92mg daily  helps with general symptoms    Lactobacillus Casei 200g daily fermented dairy product containing L. Casei  - lessened length of cold symptoms    For SORE THROAT , try: Gargle with warm salt water 2-3 times per day.     Drink lots of WATER until your urine is clear because all of the above medications can "dry you out" and cause constipation.     There are no Patient Instructions on file for this visit.    TELEMED                        Answers for HPI/ROS submitted by the patient on 6/30/2022  Chronicity: new  Onset: in the past 7 days  Frequency: every few minutes  Cough characteristics: non-productive  chest pain: Yes  chills: Yes  ear congestion: No  ear pain: No  fever: Yes  headaches: No  heartburn: No  hemoptysis: No  myalgias: Yes  nasal congestion: No  postnasal drip: No  rash: No  rhinorrhea: Yes  shortness of breath: No  sore throat: Yes  sweats: No  weight loss: No  wheezing: No  Aggravated by: nothing  asthma: No  bronchiectasis: No  bronchitis: No  COPD: Yes  emphysema: No  environmental allergies: No  pneumonia: Yes  Improvement on treatment: mild      "

## 2022-07-01 ENCOUNTER — PATIENT MESSAGE (OUTPATIENT)
Dept: ADMINISTRATIVE | Facility: OTHER | Age: 47
End: 2022-07-01
Payer: MEDICAID

## 2022-07-02 ENCOUNTER — NURSE TRIAGE (OUTPATIENT)
Dept: ADMINISTRATIVE | Facility: CLINIC | Age: 47
End: 2022-07-02
Payer: MEDICAID

## 2022-07-02 ENCOUNTER — PATIENT MESSAGE (OUTPATIENT)
Dept: PRIMARY CARE CLINIC | Facility: CLINIC | Age: 47
End: 2022-07-02
Payer: MEDICAID

## 2022-07-02 NOTE — TELEPHONE ENCOUNTER
[facility] outgoing call -     VM left for Pt explaining attempt to contact Pt and if having a medical emergency to call 911 or go to the nearest ER.    Pt returned call through switch . Incoming call.    Pt c/o covd/pneumonia, symptoms improved Pt denies having any symptoms now. Info only protocol followed and pt advised to tx at home. Education provided on isolation d/c criteria, possibility of false positive, contact pcp for work excuse, call ooc at [phone] if any questions or concerns arise. Pts questions answered as presented. Encounter routed to PCP.    Reason for Disposition   Health Information question, no triage required and triager able to answer question    Protocols used: INFORMATION ONLY CALL - NO TRIAGE-A-

## 2022-07-03 ENCOUNTER — PATIENT MESSAGE (OUTPATIENT)
Dept: ADMINISTRATIVE | Facility: OTHER | Age: 47
End: 2022-07-03
Payer: MEDICAID

## 2022-07-05 ENCOUNTER — PATIENT MESSAGE (OUTPATIENT)
Dept: OBSTETRICS AND GYNECOLOGY | Facility: CLINIC | Age: 47
End: 2022-07-05
Payer: MEDICAID

## 2022-07-05 ENCOUNTER — PATIENT MESSAGE (OUTPATIENT)
Dept: ADMINISTRATIVE | Facility: OTHER | Age: 47
End: 2022-07-05
Payer: MEDICAID

## 2022-07-07 ENCOUNTER — PATIENT MESSAGE (OUTPATIENT)
Dept: ADMINISTRATIVE | Facility: OTHER | Age: 47
End: 2022-07-07
Payer: MEDICAID

## 2022-07-10 DIAGNOSIS — I10 HTN (HYPERTENSION), BENIGN: ICD-10-CM

## 2022-07-11 RX ORDER — ATORVASTATIN CALCIUM 80 MG/1
TABLET, FILM COATED ORAL
Qty: 90 TABLET | Refills: 3 | Status: SHIPPED | OUTPATIENT
Start: 2022-07-11 | End: 2022-09-08

## 2022-07-11 RX ORDER — HYDROCHLOROTHIAZIDE 25 MG/1
TABLET ORAL
Qty: 90 TABLET | Refills: 3 | Status: SHIPPED | OUTPATIENT
Start: 2022-07-11 | End: 2023-06-01

## 2022-07-11 NOTE — TELEPHONE ENCOUNTER
Refill Routing Note   Medication(s) are not appropriate for processing by [facility] for the following reason(s):      - Required laboratory values are abnormal    [facility] action(s):  Defer  Approve       Medication Therapy Plan: Defer: HCTZ; Approve: Atorvastatin  Medication reconciliation completed: No     Appointments  past 12m or future 3m with PCP    Date Provider   Last Visit   6/30/2022 Hiwot Shah MD   Next Visit   Visit date not found Hiwot Shah MD   ED visits in past 90 days: 2        Note composed:1:09 PM 07/11/2022

## 2022-07-11 NOTE — TELEPHONE ENCOUNTER
No new care gaps identified.  [facility] Embedded Care Gaps. Reference number: [id number]. 7/11/2022   1:05:54 PM CDT

## 2022-07-22 ENCOUNTER — LAB VISIT (OUTPATIENT)
Dept: LAB | Facility: HOSPITAL | Age: 47
End: 2022-07-22
Attending: FAMILY MEDICINE
Payer: MEDICAID

## 2022-07-22 DIAGNOSIS — I10 HTN (HYPERTENSION), BENIGN: ICD-10-CM

## 2022-07-22 DIAGNOSIS — E78.2 MIXED HYPERLIPIDEMIA: ICD-10-CM

## 2022-07-22 LAB
CHOLEST SERPL-MCNC: 221 MG/DL (ref 120–199)
CHOLEST/HDLC SERPL: 4.4 {RATIO} (ref 2–5)
HDLC SERPL-MCNC: 50 MG/DL (ref 40–75)
HDLC SERPL: 22.6 % (ref 20–50)
LDLC SERPL CALC-MCNC: 152.8 MG/DL (ref 63–159)
NONHDLC SERPL-MCNC: 171 MG/DL
POTASSIUM SERPL-SCNC: 3.7 MMOL/L (ref 3.5–5.1)
TRIGL SERPL-MCNC: 91 MG/DL (ref 30–150)

## 2022-07-22 PROCEDURE — 36415 COLL VENOUS BLD VENIPUNCTURE: CPT | Mod: PN | Performed by: FAMILY MEDICINE

## 2022-07-22 PROCEDURE — 80061 LIPID PANEL: CPT | Performed by: FAMILY MEDICINE

## 2022-07-22 PROCEDURE — 84132 ASSAY OF SERUM POTASSIUM: CPT | Performed by: FAMILY MEDICINE

## 2022-07-26 ENCOUNTER — HOSPITAL ENCOUNTER (OUTPATIENT)
Dept: RADIOLOGY | Facility: OTHER | Age: 47
Discharge: HOME | End: 2022-07-26
Attending: OBSTETRICS & GYNECOLOGY
Payer: MEDICAID

## 2022-07-26 DIAGNOSIS — Z12.31 VISIT FOR SCREENING MAMMOGRAM: ICD-10-CM

## 2022-07-26 PROCEDURE — 77063 MAMMO DIGITAL SCREENING BILAT WITH TOMO: ICD-10-PCS | Mod: 26,,, | Performed by: RADIOLOGY

## 2022-07-26 PROCEDURE — 77067 MAMMO DIGITAL SCREENING BILAT WITH TOMO: ICD-10-PCS | Mod: 26,,, | Performed by: RADIOLOGY

## 2022-07-26 PROCEDURE — 77067 SCR MAMMO BI INCL CAD: CPT | Mod: 26,,, | Performed by: RADIOLOGY

## 2022-07-26 PROCEDURE — 77067 SCR MAMMO BI INCL CAD: CPT | Mod: TC

## 2022-07-26 PROCEDURE — 77063 BREAST TOMOSYNTHESIS BI: CPT | Mod: TC

## 2022-07-26 PROCEDURE — 77063 BREAST TOMOSYNTHESIS BI: CPT | Mod: 26,,, | Performed by: RADIOLOGY

## 2022-07-27 NOTE — PROGRESS NOTES
Good news!    Your LDL Cholesterol did decrease to 152 from 228.     I see that you took Rosuvastatin (Crestor) from 2013 - 2016. Back then, it may have been more expensive, but it is generic now. Did you have any problems with this, as it may lower your cholesterol more than Atorvastatin (Lipitor 80).     Let me know & we can try Crestor again

## 2022-07-28 ENCOUNTER — PATIENT MESSAGE (OUTPATIENT)
Dept: PRIMARY CARE CLINIC | Facility: CLINIC | Age: 47
End: 2022-07-28
Payer: MEDICAID

## 2022-08-24 DIAGNOSIS — R05.9 COUGH: Primary | ICD-10-CM

## 2022-08-24 RX ORDER — ALBUTEROL SULFATE 90 UG/1
1 AEROSOL, METERED RESPIRATORY (INHALATION) EVERY 6 HOURS PRN
Qty: 18 G | Refills: 3 | Status: SHIPPED | OUTPATIENT
Start: 2022-08-24 | End: 2023-03-20

## 2022-08-24 NOTE — TELEPHONE ENCOUNTER
No new care gaps identified.  [facility] Embedded Care Gaps. Reference number: [id number]. 8/24/2022   1:12:30 PM CDT

## 2022-08-25 ENCOUNTER — PATIENT MESSAGE (OUTPATIENT)
Dept: PRIMARY CARE CLINIC | Facility: CLINIC | Age: 47
End: 2022-08-25
Payer: MEDICAID

## 2022-08-25 DIAGNOSIS — M79.671 HEEL PAIN, BILATERAL: Primary | ICD-10-CM

## 2022-08-25 DIAGNOSIS — M79.672 HEEL PAIN, BILATERAL: Primary | ICD-10-CM

## 2022-08-30 ENCOUNTER — OFFICE VISIT (OUTPATIENT)
Dept: OBSTETRICS AND GYNECOLOGY | Facility: CLINIC | Age: 47
End: 2022-08-30
Attending: OBSTETRICS & GYNECOLOGY
Payer: MEDICAID

## 2022-08-30 ENCOUNTER — TELEPHONE (OUTPATIENT)
Dept: PRIMARY CARE CLINIC | Facility: CLINIC | Age: 47
End: 2022-08-30
Payer: MEDICAID

## 2022-08-30 ENCOUNTER — HOSPITAL ENCOUNTER (OUTPATIENT)
Dept: RADIOLOGY | Facility: OTHER | Age: 47
Discharge: HOME | End: 2022-08-30
Payer: MEDICAID

## 2022-08-30 VITALS
BODY MASS INDEX: 43.15 KG/M2 | DIASTOLIC BLOOD PRESSURE: 60 MMHG | WEIGHT: 274.94 LBS | HEIGHT: 67 IN | SYSTOLIC BLOOD PRESSURE: 138 MMHG

## 2022-08-30 DIAGNOSIS — N95.0 POSTMENOPAUSAL BLEEDING: Primary | ICD-10-CM

## 2022-08-30 DIAGNOSIS — N95.0 POSTMENOPAUSAL BLEEDING: ICD-10-CM

## 2022-08-30 DIAGNOSIS — E78.01 FAMILIAL HYPERCHOLESTEREMIA: Primary | ICD-10-CM

## 2022-08-30 LAB
B-HCG UR QL: NEGATIVE
CTP QC/QA: YES

## 2022-08-30 PROCEDURE — 99213 OFFICE O/P EST LOW 20 MIN: CPT | Mod: PBBFAC,PN | Performed by: OBSTETRICS & GYNECOLOGY

## 2022-08-30 PROCEDURE — 3075F PR MOST RECENT SYSTOLIC BLOOD PRESS GE 130-139MM HG: ICD-10-PCS | Mod: CPTII,,, | Performed by: OBSTETRICS & GYNECOLOGY

## 2022-08-30 PROCEDURE — 4010F ACE/ARB THERAPY RXD/TAKEN: CPT | Mod: CPTII,,, | Performed by: OBSTETRICS & GYNECOLOGY

## 2022-08-30 PROCEDURE — 3044F PR MOST RECENT HEMOGLOBIN A1C LEVEL <7.0%: ICD-10-PCS | Mod: CPTII,,, | Performed by: OBSTETRICS & GYNECOLOGY

## 2022-08-30 PROCEDURE — 3008F BODY MASS INDEX DOCD: CPT | Mod: CPTII,,, | Performed by: OBSTETRICS & GYNECOLOGY

## 2022-08-30 PROCEDURE — 1159F PR MEDICATION LIST DOCUMENTED IN MEDICAL RECORD: ICD-10-PCS | Mod: CPTII,,, | Performed by: OBSTETRICS & GYNECOLOGY

## 2022-08-30 PROCEDURE — 3044F HG A1C LEVEL LT 7.0%: CPT | Mod: CPTII,,, | Performed by: OBSTETRICS & GYNECOLOGY

## 2022-08-30 PROCEDURE — 1159F MED LIST DOCD IN RCRD: CPT | Mod: CPTII,,, | Performed by: OBSTETRICS & GYNECOLOGY

## 2022-08-30 PROCEDURE — 4010F PR ACE/ARB THEARPY RXD/TAKEN: ICD-10-PCS | Mod: CPTII,,, | Performed by: OBSTETRICS & GYNECOLOGY

## 2022-08-30 PROCEDURE — 3075F SYST BP GE 130 - 139MM HG: CPT | Mod: CPTII,,, | Performed by: OBSTETRICS & GYNECOLOGY

## 2022-08-30 PROCEDURE — 81025 URINE PREGNANCY TEST: CPT | Mod: PBBFAC,PN | Performed by: OBSTETRICS & GYNECOLOGY

## 2022-08-30 PROCEDURE — 99213 PR OFFICE/OUTPT VISIT, EST, LEVL III, 20-29 MIN: ICD-10-PCS | Mod: S$PBB,,, | Performed by: OBSTETRICS & GYNECOLOGY

## 2022-08-30 PROCEDURE — 76856 US PELVIS COMP WITH TRANSVAG NON-OB (XPD): ICD-10-PCS | Mod: 26,,, | Performed by: RADIOLOGY

## 2022-08-30 PROCEDURE — 3078F DIAST BP <80 MM HG: CPT | Mod: CPTII,,, | Performed by: OBSTETRICS & GYNECOLOGY

## 2022-08-30 PROCEDURE — 76830 TRANSVAGINAL US NON-OB: CPT | Mod: TC

## 2022-08-30 PROCEDURE — 99999 PR PBB SHADOW E&M-EST. PATIENT-LVL III: ICD-10-PCS | Mod: PBBFAC,,, | Performed by: OBSTETRICS & GYNECOLOGY

## 2022-08-30 PROCEDURE — 99999 PR PBB SHADOW E&M-EST. PATIENT-LVL III: CPT | Mod: PBBFAC,,, | Performed by: OBSTETRICS & GYNECOLOGY

## 2022-08-30 PROCEDURE — 76856 US EXAM PELVIC COMPLETE: CPT | Mod: 26,,, | Performed by: RADIOLOGY

## 2022-08-30 PROCEDURE — 3078F PR MOST RECENT DIASTOLIC BLOOD PRESSURE < 80 MM HG: ICD-10-PCS | Mod: CPTII,,, | Performed by: OBSTETRICS & GYNECOLOGY

## 2022-08-30 PROCEDURE — 99213 OFFICE O/P EST LOW 20 MIN: CPT | Mod: S$PBB,,, | Performed by: OBSTETRICS & GYNECOLOGY

## 2022-08-30 PROCEDURE — 76830 TRANSVAGINAL US NON-OB: CPT | Mod: 26,,, | Performed by: RADIOLOGY

## 2022-08-30 PROCEDURE — 76830 US PELVIS COMP WITH TRANSVAG NON-OB (XPD): ICD-10-PCS | Mod: 26,,, | Performed by: RADIOLOGY

## 2022-08-30 PROCEDURE — 3008F PR BODY MASS INDEX (BMI) DOCUMENTED: ICD-10-PCS | Mod: CPTII,,, | Performed by: OBSTETRICS & GYNECOLOGY

## 2022-08-30 NOTE — PROGRESS NOTES
Past medical, surgical, social, family, and obstetric histories; medications; prior records and results; and available outside records were reviewed and updated in the EMR.  Pertinent findings were noted below.    Reason for Visit   Vaginal Bleeding (C/o bleeding on  and . Has not had a cycle in over 1 year. Reports 4 days of bleeding, denies heavy or clotting. Moderate dysmenorrhea)    HPI   46 y.o. female     Patient's last menstrual period was 2021 (approximate).    Patient reports 4d light vaginal bleeding without clots, some cramping pain, x2 episodes starting on  and .   No fevers/chills, unexpected weight change, N/V, abdominal pain, vaginal discharge/odor/irritation/burning, issues with urination.  Reports hot flashes and vaginal dryness.     Contraception: N/A  Pap: 2022, NILM/HPV(-)  Mammogram: BIRADS1, T-C=5.05% on 2022      No pelvic exam d/t body habitus Answers submitted by the patient for this visit:  Vaginal Bleeding Questionnaire  (Submitted on 2022)  Chief Complaint: Vaginal bleeding  Onset: more than 1 month ago  Frequency: intermittently  Progression since onset: resolved  Pain severity: no pain  Pregnant now?: No  abdominal pain: No  anorexia: No  back pain: No  chills: No  constipation: No  diarrhea: No  discolored urine: No  dysuria: No  fever: No  flank pain: No  frequency: No  headaches: No  hematuria: No  nausea: No  painful intercourse: No  rash: No  urgency: No  vomiting: No  Vaginal bleeding: typical of menses  Passing clots?: No  Passing tissue?: No  Aggravated by: nothing  treatments tried: nothing  Sexual activity: sexually active  Partner with STD symptoms: no  Menstrual history: postmenopausal  STD: No  abdominal surgery: No   section: No  Ectopic pregnancy: No  Endometriosis: No  herpes simplex: No  gynecological surgery: No  menorrhagia: No  metrorrhagia: No  miscarriage: No  ovarian cysts: No  perineal abscess: No  PID:  "No  terminated pregnancy: No  vaginosis: No      Exam   /60   Ht 5' 7" (1.702 m)   Wt 124.7 kg (274 lb 14.6 oz)   LMP 05/19/2021 (Approximate)   BMI 43.06 kg/m²     Physical Exam  Constitutional:       General: She is not in acute distress.     Appearance: Normal appearance.   Genitourinary:      Vulva normal.      Right Labia: No tenderness or lesions.     Left Labia: No tenderness or lesions.     No vaginal discharge, erythema or bleeding.      No cervical motion tenderness, discharge, friability, lesion or polyp.   HENT:      Head: Normocephalic and atraumatic.   Eyes:      Extraocular Movements: Extraocular movements intact.   Cardiovascular:      Rate and Rhythm: Normal rate.   Pulmonary:      Effort: Pulmonary effort is normal. No respiratory distress.   Abdominal:      General: Abdomen is flat. There is no distension.      Palpations: Abdomen is soft.      Tenderness: There is no abdominal tenderness.   Musculoskeletal:         General: Normal range of motion.      Cervical back: Normal range of motion.   Neurological:      General: No focal deficit present.      Mental Status: She is alert and oriented to person, place, and time. Mental status is at baseline.   Skin:     General: Skin is warm and dry.   Psychiatric:         Mood and Affect: Mood normal.         Behavior: Behavior normal.         Thought Content: Thought content normal.         Judgment: Judgment normal.     Assessment and Plan   Postmenopausal bleeding  -     POCT Urine Pregnancy  -     US Pelvis Comp with Transvag NON-OB (xpd); Future; Expected date: 08/30/2022    Will obtain ultrasound to start work up for  to r/o anatomical etiology of bleeding.   Due to cyclic nature of bleeding episodes, likely ovulatory cycles.  Discussed benefits of EmBx with patient. Patient defers in-office biopsy d/t pain intolerance with past office based procedures (IUD insertion/removal). Would prefer endometrial biopsy in OR if needed. "         Felicity Saunders MD   OB/GYN PGY1  [facility]

## 2022-08-30 NOTE — TELEPHONE ENCOUNTER
----- Message from Sergey Chakraborty PharmD sent at 8/24/2022 10:04 AM CDT -----  Regarding: Lipid Patient  Hi Dr. Shah:    This patient is enrolled in our lipids digital medicine program and I discussed her today with Dr. Ramos at our roundtable discussion.  He believes that her high baseline LDL is indicative of familial hypercholesterolemia and this diagnosis should be added to her history.  I have put her on lipitor 80mg plus zetia and LDL is still 150's.  She would qualify for a PCSK9 inhibitor if she had familial dx.  Please let me know your thoughts.    Sincerely,    Sergey Chakraborty, PharmD  Clinical Pharmacist  [facility]  [phone]

## 2022-08-30 NOTE — TELEPHONE ENCOUNTER
Please let pt know that Cardiology & Digital Med have reached out to me w her high choelsterol levels despite 2 medications    Referral in to Cardiology to discuss best way to prevent future heart attack & stroke

## 2022-08-31 ENCOUNTER — TELEPHONE (OUTPATIENT)
Dept: OBSTETRICS AND GYNECOLOGY | Facility: CLINIC | Age: 47
End: 2022-08-31
Payer: MEDICAID

## 2022-08-31 ENCOUNTER — PATIENT MESSAGE (OUTPATIENT)
Dept: OBSTETRICS AND GYNECOLOGY | Facility: CLINIC | Age: 47
End: 2022-08-31
Payer: MEDICAID

## 2022-08-31 DIAGNOSIS — N95.0 PMB (POSTMENOPAUSAL BLEEDING): Primary | ICD-10-CM

## 2022-08-31 RX ORDER — MUPIROCIN 20 MG/G
OINTMENT TOPICAL
Status: CANCELLED | OUTPATIENT
Start: 2022-08-31

## 2022-08-31 RX ORDER — SODIUM CHLORIDE 9 MG/ML
INJECTION, SOLUTION INTRAVENOUS CONTINUOUS
Status: CANCELLED | OUTPATIENT
Start: 2022-08-31

## 2022-08-31 NOTE — TELEPHONE ENCOUNTER
Let her know her ultrasound shows some fibroids.  Can you see if 9/21 at 12 works to do a hsc/d&c for postmenopausal bleeding?

## 2022-09-08 ENCOUNTER — OFFICE VISIT (OUTPATIENT)
Dept: CARDIOLOGY | Facility: CLINIC | Age: 47
End: 2022-09-08
Payer: MEDICAID

## 2022-09-08 VITALS
WEIGHT: 282.63 LBS | BODY MASS INDEX: 44.36 KG/M2 | HEIGHT: 67 IN | HEART RATE: 103 BPM | DIASTOLIC BLOOD PRESSURE: 76 MMHG | SYSTOLIC BLOOD PRESSURE: 115 MMHG

## 2022-09-08 DIAGNOSIS — R06.02 SHORTNESS OF BREATH: ICD-10-CM

## 2022-09-08 DIAGNOSIS — E78.00 PURE HYPERCHOLESTEROLEMIA: Primary | ICD-10-CM

## 2022-09-08 PROCEDURE — 1159F MED LIST DOCD IN RCRD: CPT | Mod: CPTII,,, | Performed by: INTERNAL MEDICINE

## 2022-09-08 PROCEDURE — 3044F PR MOST RECENT HEMOGLOBIN A1C LEVEL <7.0%: ICD-10-PCS | Mod: CPTII,,, | Performed by: INTERNAL MEDICINE

## 2022-09-08 PROCEDURE — 1160F PR REVIEW ALL MEDS BY PRESCRIBER/CLIN PHARMACIST DOCUMENTED: ICD-10-PCS | Mod: CPTII,,, | Performed by: INTERNAL MEDICINE

## 2022-09-08 PROCEDURE — 3044F HG A1C LEVEL LT 7.0%: CPT | Mod: CPTII,,, | Performed by: INTERNAL MEDICINE

## 2022-09-08 PROCEDURE — 3074F PR MOST RECENT SYSTOLIC BLOOD PRESSURE < 130 MM HG: ICD-10-PCS | Mod: CPTII,,, | Performed by: INTERNAL MEDICINE

## 2022-09-08 PROCEDURE — 4010F ACE/ARB THERAPY RXD/TAKEN: CPT | Mod: CPTII,,, | Performed by: INTERNAL MEDICINE

## 2022-09-08 PROCEDURE — 99213 OFFICE O/P EST LOW 20 MIN: CPT | Mod: PBBFAC,PN | Performed by: INTERNAL MEDICINE

## 2022-09-08 PROCEDURE — 99214 OFFICE O/P EST MOD 30 MIN: CPT | Mod: S$PBB,,, | Performed by: INTERNAL MEDICINE

## 2022-09-08 PROCEDURE — 99999 PR PBB SHADOW E&M-EST. PATIENT-LVL III: CPT | Mod: PBBFAC,,, | Performed by: INTERNAL MEDICINE

## 2022-09-08 PROCEDURE — 3008F BODY MASS INDEX DOCD: CPT | Mod: CPTII,,, | Performed by: INTERNAL MEDICINE

## 2022-09-08 PROCEDURE — 3008F PR BODY MASS INDEX (BMI) DOCUMENTED: ICD-10-PCS | Mod: CPTII,,, | Performed by: INTERNAL MEDICINE

## 2022-09-08 PROCEDURE — 3078F DIAST BP <80 MM HG: CPT | Mod: CPTII,,, | Performed by: INTERNAL MEDICINE

## 2022-09-08 PROCEDURE — 99999 PR PBB SHADOW E&M-EST. PATIENT-LVL III: ICD-10-PCS | Mod: PBBFAC,,, | Performed by: INTERNAL MEDICINE

## 2022-09-08 PROCEDURE — 3078F PR MOST RECENT DIASTOLIC BLOOD PRESSURE < 80 MM HG: ICD-10-PCS | Mod: CPTII,,, | Performed by: INTERNAL MEDICINE

## 2022-09-08 PROCEDURE — 4010F PR ACE/ARB THEARPY RXD/TAKEN: ICD-10-PCS | Mod: CPTII,,, | Performed by: INTERNAL MEDICINE

## 2022-09-08 PROCEDURE — 3074F SYST BP LT 130 MM HG: CPT | Mod: CPTII,,, | Performed by: INTERNAL MEDICINE

## 2022-09-08 PROCEDURE — 1160F RVW MEDS BY RX/DR IN RCRD: CPT | Mod: CPTII,,, | Performed by: INTERNAL MEDICINE

## 2022-09-08 PROCEDURE — 1159F PR MEDICATION LIST DOCUMENTED IN MEDICAL RECORD: ICD-10-PCS | Mod: CPTII,,, | Performed by: INTERNAL MEDICINE

## 2022-09-08 PROCEDURE — 99214 PR OFFICE/OUTPT VISIT, EST, LEVL IV, 30-39 MIN: ICD-10-PCS | Mod: S$PBB,,, | Performed by: INTERNAL MEDICINE

## 2022-09-08 RX ORDER — ROSUVASTATIN CALCIUM 40 MG/1
40 TABLET, COATED ORAL NIGHTLY
Qty: 90 TABLET | Refills: 3 | Status: SHIPPED | OUTPATIENT
Start: 2022-09-08 | End: 2023-11-22 | Stop reason: SDUPTHER

## 2022-09-08 NOTE — PROGRESS NOTES
[facility] Cardiology [id number]     SUBJECTIVE:     History of Present Illness:  Patient is a 46 y.o. female presents with abnormal lipids and here for this .     Primary Diagnosis:   Hypertension  DM: none  Nonsmoker  Family history of early CAD: none  Heart disease: none  No history of asthma   ROS  No chest pains  Has had shortness of breath with activity recently like getting out of the shower; none at rest; none at night; probably for the last 3 months or so   No recent syncope  No palpitations  Blood pressures at home normal   Activity: limited by knee; walks on the treadmill; cleans house   Review of patient's allergies indicates:  No Known Allergies    Past Medical History:   Diagnosis Date    Constipation     GI Dr Reece 2014    Elevated platelet count 03/11/2021    Refer hematol 3/2021    Familial hypercholesteremia     per digital lipid    Gastritis 07/30/2021 2021    Gastroesophageal reflux disease without esophagitis 08/28/2018    She states she had EGD in past    Grief 09/19/2017    mom passed 2017    HTN (hypertension), benign 03/10/2015    Digital med 12/20    Intestinal erosis 09/17/2021    CS 2021    Iron deficiency anemia secondary to inadequate dietary iron intake 08/26/2021    Start OTC 8/21    Knee pain, left 2012    scope, Dr Reyes, injury at work, treadmill helps with stiffness    Low blood potassium 07/30/2021    avoid HCTZ    Mixed hyperlipidemia 08/29/2013    Morbid obesity 02/23/2015    bariatrics not covered by insurance    Spinal cord stimulator status 08/28/2018    In back for L knee pain, Dr Kessler surgery & pain mgmt, Lyrica or Hydrocodone at night    Thyroid disease        Past Surgical History:   Procedure Laterality Date    COLONOSCOPY N/A 9/17/2021    Procedure: COLONOSCOPY;  Surgeon: Keith Mckeon MD;  Location: [facility] ([address] [place] [address]);  Service: Endoscopy;  Laterality: N/A;  prep ins. emailed [place] [facility]    DILATION AND CURETTAGE OF UTERUS       ESOPHAGOGASTRODUODENOSCOPY N/A 7/27/2021    Procedure: ESOPHAGOGASTRODUODENOSCOPY (EGD);  Surgeon: Keith Mckeon MD;  Location: [facility] ([address] [place] [address]);  Service: Endoscopy;  Laterality: N/A;  COVID test 7/24 [facility]-    KNEE ARTHROSCOPY      L, Dasa    KNEE SURGERY      SKIN GRAFT      left finger due to injury    SPINAL CORD STIMULATOR IMPLANT      to L knee, Dr Kessler    TUBAL LIGATION         Family History   Problem Relation Age of Onset    Hypertension Mother     Diabetes Mother     COPD Mother     Breast cancer Neg Hx     Colon cancer Neg Hx     Ovarian cancer Neg Hx        Social History     Tobacco Use    Smoking status: Never    Smokeless tobacco: Never   Substance Use Topics    Alcohol use: No    Drug use: No        Past Hospitalization:     Home meds:  Current Outpatient Medications on File Prior to Visit   Medication Sig Dispense Refill    albuterol (PROVENTIL/VENTOLIN HFA) 90 mcg/actuation inhaler Inhale 1 puff into the lungs every 6 (six) hours as needed for Wheezing. Rescue 18 g 3    atorvastatin (LIPITOR) 80 MG tablet TAKE 1 TABLET(80 MG) BY MOUTH EVERY DAY 90 tablet 3    blood pressure monitor ([employer] EASE) XL arm size (OP) by Other route as needed for High Blood Pressure. 1 each 0    ezetimibe (ZETIA) 10 mg tablet Take 1 tablet (10 mg total) by mouth once daily. 90 tablet 3    hydroCHLOROthiazide (HYDRODIURIL) 25 MG tablet TAKE 1 TABLET(25 MG) BY MOUTH EVERY DAY 90 tablet 3    ibuprofen (ADVIL,MOTRIN) 600 MG tablet Take 1 tablet (600 mg total) by mouth every 6 (six) hours as needed for Pain. 12 tablet 0    losartan (COZAAR) 100 MG tablet Take 1 tablet (100 mg total) by mouth once daily. 90 tablet 3    pantoprazole (PROTONIX) 40 MG tablet TAKE 1 TABLET(40 MG) BY MOUTH EVERY DAY 90 tablet 0    potassium chloride SA (KLOR-CON M15) 15 MEQ tablet Take 1 tablet (15 mEq total) by mouth once daily. 30 tablet 11    potassium citrate (UROCIT-K 15) 15 mEq TbSR Take 1 tablet by mouth once daily.       pregabalin (LYRICA) 100 MG capsule Take 100 mg by mouth 2 (two) times daily.       No current facility-administered medications on file prior to visit.       Cardiac meds:  Atorvastatin 80 mg  Zetia 10 mg  HCTZ 25 mg  Losartan 100 mg  K   Albuterol for shortness of breath         OBJECTIVE:     Vital Signs (Most Recent)  There were no vitals filed for this visit.      Physical Exam:  Neck: normal carotids, no bruits; normal JVP  Lungs :clear  Heart: RR, normal S1,S2, no murmurs, no gallops  Abd: no masses; no bruits;   Exts: normal DP and PT pulses bilaterally, no edema noted           LABS    CMP  Recent Labs   Lab Result Units 22  0805   Potassium mmol/L 3.7       LIPID  Recent Labs   Lab Result Units 22  0805   HDL mg/dL 50   Cholesterol mg/dL 221*   Triglycerides mg/dL 91   LDL Cholesterol mg/dL 152.8   HDL/Cholesterol Ratio % 22.6   Total Cholesterol/HDL Ratio  4.4       ;    Diagnostic Results:    EK/22: normal     Echo: : normal EF; normal diastology     Chart review:        ASSESSMENT/PLAN:     LDL at one time 2015; Zetia just added 2 months ago  2. No side effects from statin   3. Shortness of breath - why?   Plan: 1. She has no target organ damage and thus would continue the zetia and change the atorvastatin to rosuvastatin 40 mg and check lipids again. If still abnormal, will start PCSK9 drug  2. Lipids in 3 months return after   3. Echocardiogram   4. Return 4 months     Chao Call MD

## 2022-09-12 ENCOUNTER — TELEPHONE (OUTPATIENT)
Dept: ADMINISTRATIVE | Facility: OTHER | Age: 47
End: 2022-09-12
Payer: MEDICAID

## 2022-09-15 ENCOUNTER — ANESTHESIA EVENT (OUTPATIENT)
Dept: SURGERY | Facility: OTHER | Age: 47
End: 2022-09-15
Payer: MEDICAID

## 2022-09-15 ENCOUNTER — HOSPITAL ENCOUNTER (OUTPATIENT)
Dept: PREADMISSION TESTING | Facility: OTHER | Age: 47
Discharge: HOME | End: 2022-09-15
Attending: OBSTETRICS & GYNECOLOGY
Payer: MEDICAID

## 2022-09-15 ENCOUNTER — OFFICE VISIT (OUTPATIENT)
Dept: OBSTETRICS AND GYNECOLOGY | Facility: CLINIC | Age: 47
End: 2022-09-15
Attending: OBSTETRICS & GYNECOLOGY
Payer: MEDICAID

## 2022-09-15 VITALS
RESPIRATION RATE: 16 BRPM | SYSTOLIC BLOOD PRESSURE: 122 MMHG | BODY MASS INDEX: 44.1 KG/M2 | OXYGEN SATURATION: 97 % | WEIGHT: 281 LBS | HEIGHT: 67 IN | TEMPERATURE: 98 F | HEART RATE: 94 BPM | DIASTOLIC BLOOD PRESSURE: 74 MMHG

## 2022-09-15 VITALS
SYSTOLIC BLOOD PRESSURE: 107 MMHG | HEIGHT: 67 IN | BODY MASS INDEX: 44.25 KG/M2 | WEIGHT: 281.94 LBS | DIASTOLIC BLOOD PRESSURE: 75 MMHG

## 2022-09-15 DIAGNOSIS — N95.0 PMB (POSTMENOPAUSAL BLEEDING): Primary | ICD-10-CM

## 2022-09-15 DIAGNOSIS — Z01.818 PREOP TESTING: Primary | ICD-10-CM

## 2022-09-15 PROBLEM — E87.6 LOW BLOOD POTASSIUM: Status: RESOLVED | Noted: 2021-07-30 | Resolved: 2022-09-15

## 2022-09-15 LAB
ANION GAP SERPL CALC-SCNC: 10 MMOL/L (ref 8–16)
BASOPHILS # BLD AUTO: 0.04 K/UL (ref 0–0.2)
BASOPHILS NFR BLD: 0.5 % (ref 0–1.9)
BUN SERPL-MCNC: 14 MG/DL (ref 6–20)
CALCIUM SERPL-MCNC: 8.4 MG/DL (ref 8.7–10.5)
CHLORIDE SERPL-SCNC: 106 MMOL/L (ref 95–110)
CO2 SERPL-SCNC: 26 MMOL/L (ref 23–29)
CREAT SERPL-MCNC: 1 MG/DL (ref 0.5–1.4)
DIFFERENTIAL METHOD: ABNORMAL
EOSINOPHIL # BLD AUTO: 0.2 K/UL (ref 0–0.5)
EOSINOPHIL NFR BLD: 2 % (ref 0–8)
ERYTHROCYTE [DISTWIDTH] IN BLOOD BY AUTOMATED COUNT: 14 % (ref 11.5–14.5)
EST. GFR  (NO RACE VARIABLE): >60 ML/MIN/1.73 M^2
GLUCOSE SERPL-MCNC: 142 MG/DL (ref 70–110)
HCT VFR BLD AUTO: 34.4 % (ref 37–48.5)
HGB BLD-MCNC: 11.1 G/DL (ref 12–16)
IMM GRANULOCYTES # BLD AUTO: 0.01 K/UL (ref 0–0.04)
IMM GRANULOCYTES NFR BLD AUTO: 0.1 % (ref 0–0.5)
LYMPHOCYTES # BLD AUTO: 3.5 K/UL (ref 1–4.8)
LYMPHOCYTES NFR BLD: 43.3 % (ref 18–48)
MCH RBC QN AUTO: 29 PG (ref 27–31)
MCHC RBC AUTO-ENTMCNC: 32.3 G/DL (ref 32–36)
MCV RBC AUTO: 90 FL (ref 82–98)
MONOCYTES # BLD AUTO: 0.6 K/UL (ref 0.3–1)
MONOCYTES NFR BLD: 7.9 % (ref 4–15)
NEUTROPHILS # BLD AUTO: 3.7 K/UL (ref 1.8–7.7)
NEUTROPHILS NFR BLD: 46.2 % (ref 38–73)
NRBC BLD-RTO: 0 /100 WBC
PLATELET # BLD AUTO: 323 K/UL (ref 150–450)
PMV BLD AUTO: 9.7 FL (ref 9.2–12.9)
POTASSIUM SERPL-SCNC: 3.5 MMOL/L (ref 3.5–5.1)
RBC # BLD AUTO: 3.83 M/UL (ref 4–5.4)
SODIUM SERPL-SCNC: 142 MMOL/L (ref 136–145)
WBC # BLD AUTO: 8.1 K/UL (ref 3.9–12.7)

## 2022-09-15 PROCEDURE — 85025 COMPLETE CBC W/AUTO DIFF WBC: CPT | Performed by: ANESTHESIOLOGY

## 2022-09-15 PROCEDURE — 3074F PR MOST RECENT SYSTOLIC BLOOD PRESSURE < 130 MM HG: ICD-10-PCS | Mod: CPTII,,, | Performed by: OBSTETRICS & GYNECOLOGY

## 2022-09-15 PROCEDURE — 4010F PR ACE/ARB THEARPY RXD/TAKEN: ICD-10-PCS | Mod: CPTII,,, | Performed by: OBSTETRICS & GYNECOLOGY

## 2022-09-15 PROCEDURE — 99499 UNLISTED E&M SERVICE: CPT | Mod: S$PBB,,, | Performed by: OBSTETRICS & GYNECOLOGY

## 2022-09-15 PROCEDURE — 3078F DIAST BP <80 MM HG: CPT | Mod: CPTII,,, | Performed by: OBSTETRICS & GYNECOLOGY

## 2022-09-15 PROCEDURE — 3044F HG A1C LEVEL LT 7.0%: CPT | Mod: CPTII,,, | Performed by: OBSTETRICS & GYNECOLOGY

## 2022-09-15 PROCEDURE — 36415 COLL VENOUS BLD VENIPUNCTURE: CPT | Performed by: ANESTHESIOLOGY

## 2022-09-15 PROCEDURE — 4010F ACE/ARB THERAPY RXD/TAKEN: CPT | Mod: CPTII,,, | Performed by: OBSTETRICS & GYNECOLOGY

## 2022-09-15 PROCEDURE — 99999 PR PBB SHADOW E&M-EST. PATIENT-LVL III: ICD-10-PCS | Mod: PBBFAC,,, | Performed by: OBSTETRICS & GYNECOLOGY

## 2022-09-15 PROCEDURE — 99499 NO LOS: ICD-10-PCS | Mod: S$PBB,,, | Performed by: OBSTETRICS & GYNECOLOGY

## 2022-09-15 PROCEDURE — 3074F SYST BP LT 130 MM HG: CPT | Mod: CPTII,,, | Performed by: OBSTETRICS & GYNECOLOGY

## 2022-09-15 PROCEDURE — 99213 OFFICE O/P EST LOW 20 MIN: CPT | Mod: PBBFAC,PN | Performed by: OBSTETRICS & GYNECOLOGY

## 2022-09-15 PROCEDURE — 3008F BODY MASS INDEX DOCD: CPT | Mod: CPTII,,, | Performed by: OBSTETRICS & GYNECOLOGY

## 2022-09-15 PROCEDURE — 3008F PR BODY MASS INDEX (BMI) DOCUMENTED: ICD-10-PCS | Mod: CPTII,,, | Performed by: OBSTETRICS & GYNECOLOGY

## 2022-09-15 PROCEDURE — 99999 PR PBB SHADOW E&M-EST. PATIENT-LVL III: CPT | Mod: PBBFAC,,, | Performed by: OBSTETRICS & GYNECOLOGY

## 2022-09-15 PROCEDURE — 3044F PR MOST RECENT HEMOGLOBIN A1C LEVEL <7.0%: ICD-10-PCS | Mod: CPTII,,, | Performed by: OBSTETRICS & GYNECOLOGY

## 2022-09-15 PROCEDURE — 80048 BASIC METABOLIC PNL TOTAL CA: CPT | Performed by: ANESTHESIOLOGY

## 2022-09-15 PROCEDURE — 3078F PR MOST RECENT DIASTOLIC BLOOD PRESSURE < 80 MM HG: ICD-10-PCS | Mod: CPTII,,, | Performed by: OBSTETRICS & GYNECOLOGY

## 2022-09-15 RX ORDER — LIDOCAINE HYDROCHLORIDE 10 MG/ML
0.5 INJECTION, SOLUTION EPIDURAL; INFILTRATION; INTRACAUDAL; PERINEURAL ONCE
Status: CANCELLED | OUTPATIENT
Start: 2022-09-15 | End: 2022-09-15

## 2022-09-15 RX ORDER — ACETAMINOPHEN 500 MG
1000 TABLET ORAL
Status: CANCELLED | OUTPATIENT
Start: 2022-09-15 | End: 2022-09-15

## 2022-09-15 RX ORDER — SODIUM CHLORIDE, SODIUM LACTATE, POTASSIUM CHLORIDE, CALCIUM CHLORIDE 600; 310; 30; 20 MG/100ML; MG/100ML; MG/100ML; MG/100ML
INJECTION, SOLUTION INTRAVENOUS CONTINUOUS
Status: CANCELLED | OUTPATIENT
Start: 2022-09-15

## 2022-09-15 RX ORDER — PREGABALIN 75 MG/1
75 CAPSULE ORAL ONCE
Status: CANCELLED | OUTPATIENT
Start: 2022-09-15 | End: 2022-09-15

## 2022-09-15 NOTE — ANESTHESIA PREPROCEDURE EVALUATION
09/15/2022  Rama Blair is a 46 y.o., female.      Pre-op Assessment    I have reviewed the Patient Summary Reports.     I have reviewed the Nursing Notes. I have reviewed the NPO Status.   I have reviewed the Medications.     Review of Systems  Anesthesia Hx:  No problems with previous Anesthesia  Denies Family Hx of Anesthesia complications.   Denies Personal Hx of Anesthesia complications.   Hematology/Oncology:  Hematology Normal   Oncology Normal     EENT/Dental:EENT/Dental Normal   Cardiovascular:   Hypertension ECG has been reviewed. Recent echo in epic WNL   Pulmonary:   Shortness of breath Covid in June   Renal/:  Renal/ Normal     Hepatic/GI:   GERD    Musculoskeletal:  Musculoskeletal Normal    Neurological:  Neurology Normal    Endocrine:  Endocrine Normal  Morbid Obesity / BMI > 40  Dermatological:  Skin Normal    Psych:  Psychiatric Normal              Anesthesia Plan  Type of Anesthesia, risks & benefits discussed:    Anesthesia Type: Gen ETT  Intra-op Monitoring Plan: Standard ASA Monitors  Post Op Pain Control Plan: multimodal analgesia  Induction:  IV  Airway Plan: Video  Informed Consent: Informed consent signed with the Patient and all parties understand the risks and agree with anesthesia plan.  All questions answered.   ASA Score: 3  Anesthesia Plan Notes: Cardiology note from 9/8/22-(hyperlipidemia)  Getting echo-9/16- need results  EKG normal  CBC/EKG ordered  Echo WNL    Ready For Surgery From Anesthesia Perspective.     .

## 2022-09-15 NOTE — DISCHARGE INSTRUCTIONS
Information to Prepare you for your Surgery    PRE-ADMIT TESTING -  [phone]    [address]          Your surgery has been scheduled at [facility]. We are pleased to have the opportunity to serve you. For Further Information please call [phone].    On the day of surgery please report to the Information Desk on the 1st floor.    CONTACT YOUR PHYSICIAN'S OFFICE THE DAY PRIOR TO YOUR SURGERY TO OBTAIN YOUR ARRIVAL TIME.     The evening before surgery do not eat anything after 9 p.m. ( this includes hard candy, chewing gum and mints).  You may only have GATORADE, POWERADE AND WATER  from 9 p.m. until you leave your home.   DO NOT DRINK ANY LIQUIDS ON THE WAY TO THE HOSPITAL.      Why does your anesthesiologist allow you to drink Gatorade/Powerade before surgery?  Gatorade/Powerade helps to increase your comfort before surgery and to decrease your nausea after surgery. The carbohydrates in Gatorade/Powerade help reduce your body's stress response to surgery.  If you are a diabetic-drink only water prior to surgery.      Current Visitor policy(12/27/2021) - Patients may have 2 visitors pre and post procedure. Only 2 visitors will be allowed in the Surgical building with the patient.     SPECIAL MEDICATION INSTRUCTIONS: TAKE medications checked off by the Anesthesiologist on your Medication List.    Angiogram Patients: Take medications as instructed by your physician, including aspirin.     Surgery Patients:    If you take ASPIRIN - Your PHYSICIAN/SURGEON will need to inform you IF/OR when you need to stop taking aspirin prior to your surgery.     Do Not take any medications containing IBUPROFEN.    Do Not Wear any make-up (especially eye make-up) to surgery. Please remove any false eyelashes or eyelash extensions. If you arrive the day of surgery with makeup/eyelashes on you will be required to remove prior to surgery. (There is a risk of corneal  abrasions if eye makeup/eyelash extensions are not removed)      Leave all valuables at home.   Do Not wear any jewelry or watches, including any metal in body piercings. Jewelry must be removed prior to coming to the hospital.  There is a possibility that rings that are unable to be removed may be cut off if they are on the surgical extremity.    Please remove all hair extensions, wigs, clips and any other metal accessories/ ornaments from your hair.  These items may pose a flammable/fire risk in Surgery and must be removed.    Do not shave your surgical area at least 5 days prior to your surgery. The surgical prep will be performed at the hospital according to Infection Control regulations.    Contact Lens must be removed before surgery. Either do not wear the contact lens or bring a case and solution for storage.  Please bring a container for eyeglasses or dentures as required.  Bring any paperwork your physician has provided, such as consent forms,  history and physicals, doctor's orders, etc.   Bring comfortable clothes that are loose fitting to wear upon discharge. Take into consideration the type of surgery being performed.  Maintain your diet as advised per your physician the day prior to surgery.      Adequate rest the night before surgery is advised.   Park in the Parking lot behind the hospital or in the [employer] [facility] across the street from the parking lot. Parking is complimentary.  If you will be discharged the same day as your procedure, please arrange for a responsible adult to drive you home or to accompany you if traveling by taxi.   YOU WILL NOT BE PERMITTED TO DRIVE OR TO LEAVE THE HOSPITAL ALONE AFTER SURGERY.   If you are being discharged the same day, it is strongly recommended that you arrange for someone to remain with you for the first 24 hrs following your surgery.    The Surgeon will speak to your family/visitor after your surgery regarding the outcome of your surgery and post op  care.  The Surgeon may speak to you after your surgery, but there is a possibility you may not remember the details.  Please check with your family members regarding the conversation with the Surgeon.    We strongly recommend whoever is bringing you home be present for discharge instructions.  This will ensure a thorough understanding for your post op home care.    ALL CHILDREN MUST ALWAYS BE ACCOMPANIED BY AN ADULT.    Visitors-Refer to current Visitor policy handouts.    Thank you for your cooperation.  The Staff of [facility].            Bathing Instructions with Hibiclens    Shower the evening before and morning of your procedure with Hibiclens:  Wash your face with water and your regular face wash/soap  Apply Hibiclens directly on your skin or on a wet washcloth and wash gently. When showering: Move away from the shower stream when applying Hibiclens to avoid rinsing off too soon.  Rinse thoroughly with warm water  Do not dilute Hibiclens        Dry off as usual, do not use any deodorant, powder, body lotions, perfume, after shave or cologne.

## 2022-09-15 NOTE — PROGRESS NOTES
Subjective:       Patient ID: Rama Blair is a 46 y.o. female.    Chief Complaint: Pre-op Exam    HPI She is here for pre-op.  She has had some postmenopausal bleeding and desires biopsy in the OR.    Review of Systems  ROS:  GENERAL: No fever, chills, fatigability or weight loss.  VULVAR: No pain, no lesions and no itching.  VAGINAL: No relaxation, no itching, no discharge, + abnormal bleeding and no lesions.  ABDOMEN: No abdominal pain. Denies nausea. Denies vomiting. No diarrhea. No constipation  BREAST: Denies pain. No lumps. No discharge.  URINARY: No incontinence, no nocturia, no frequency and no dysuria.  CARDIOVASCULAR: No chest pain. No shortness of breath. No leg cramps.  NEUROLOGICAL: no headaches. No vision changes.       Objective:      Physical Exam    Physical Exam  Constitutional:       General: She is not in acute distress.     Appearance: Normal appearance.   Genitourinary:      Vulva normal.      Right Labia: No tenderness or lesions.     Left Labia: No tenderness or lesions.     No vaginal discharge, erythema or bleeding.      No cervical motion tenderness, discharge, friability, lesion or polyp.   HENT:      Head: Normocephalic and atraumatic.   Eyes:      Extraocular Movements: Extraocular movements intact.   Cardiovascular:      Rate and Rhythm: Normal rate.   Pulmonary:      Effort: Pulmonary effort is normal. No respiratory distress.   Abdominal:      General: Abdomen is flat. There is no distension.      Palpations: Abdomen is soft.      Tenderness: There is no abdominal tenderness.   Musculoskeletal:         General: Normal range of motion.      Cervical back: Normal range of motion.   Neurological:      General: No focal deficit present.      Mental Status: She is alert and oriented to person, place, and time. Mental status is at baseline.   Skin:     General: Skin is warm and dry.   Psychiatric:         Mood and Affect: Mood normal.         Behavior: Behavior normal.          Thought Content: Thought content normal.         Judgment: Judgment normal.   Assessment:       Problem List Items Addressed This Visit    None  Visit Diagnoses       PMB (postmenopausal bleeding)    -  Primary              Plan:       Plan HSC/D&C.  I have discussed the risks, benefits, indications, and alternatives of the procedure in detail.  The patient verbalizes her understanding.  All questions answered.  Consents signed.  The patient agrees to proceed to proceed as planned.

## 2022-09-16 ENCOUNTER — HOSPITAL ENCOUNTER (OUTPATIENT)
Dept: CARDIOLOGY | Facility: HOSPITAL | Age: 47
Discharge: HOME | End: 2022-09-16
Attending: INTERNAL MEDICINE
Payer: MEDICAID

## 2022-09-16 ENCOUNTER — PATIENT MESSAGE (OUTPATIENT)
Dept: CARDIOLOGY | Facility: CLINIC | Age: 47
End: 2022-09-16
Payer: MEDICAID

## 2022-09-16 VITALS
HEIGHT: 67 IN | WEIGHT: 283 LBS | BODY MASS INDEX: 44.42 KG/M2 | SYSTOLIC BLOOD PRESSURE: 123 MMHG | HEART RATE: 85 BPM | DIASTOLIC BLOOD PRESSURE: 63 MMHG

## 2022-09-16 DIAGNOSIS — R06.02 SHORTNESS OF BREATH: ICD-10-CM

## 2022-09-16 LAB
ASCENDING AORTA: 2.73 CM
AV INDEX (PROSTH): 0.76
AV MEAN GRADIENT: 6 MMHG
AV PEAK GRADIENT: 12 MMHG
AV VALVE AREA: 2.39 CM2
AV VELOCITY RATIO: 0.7
BSA FOR ECHO PROCEDURE: 2.46 M2
CV ECHO LV RWT: 0.4 CM
DOP CALC AO PEAK VEL: 1.75 M/S
DOP CALC AO VTI: 31.91 CM
DOP CALC LVOT AREA: 3.1 CM2
DOP CALC LVOT DIAMETER: 2 CM
DOP CALC LVOT PEAK VEL: 1.23 M/S
DOP CALC LVOT STROKE VOLUME: 76.3 CM3
DOP CALCLVOT PEAK VEL VTI: 24.3 CM
E WAVE DECELERATION TIME: 134.45 MSEC
E/A RATIO: 0.97
E/E' RATIO: 9.76 M/S
ECHO LV POSTERIOR WALL: 0.88 CM (ref 0.6–1.1)
EJECTION FRACTION: 60 %
FRACTIONAL SHORTENING: 33 % (ref 28–44)
INTERVENTRICULAR SEPTUM: 0.78 CM (ref 0.6–1.1)
IVRT: 88.49 MSEC
LA MAJOR: 5.13 CM
LA MINOR: 5.14 CM
LA WIDTH: 3.4 CM
LEFT ATRIUM SIZE: 3.65 CM
LEFT ATRIUM VOLUME INDEX: 23.1 ML/M2
LEFT ATRIUM VOLUME: 54.17 CM3
LEFT INTERNAL DIMENSION IN SYSTOLE: 2.91 CM (ref 2.1–4)
LEFT VENTRICLE DIASTOLIC VOLUME INDEX: 36.66 ML/M2
LEFT VENTRICLE DIASTOLIC VOLUME: 85.78 ML
LEFT VENTRICLE MASS INDEX: 48 G/M2
LEFT VENTRICLE SYSTOLIC VOLUME INDEX: 13.9 ML/M2
LEFT VENTRICLE SYSTOLIC VOLUME: 32.49 ML
LEFT VENTRICULAR INTERNAL DIMENSION IN DIASTOLE: 4.36 CM (ref 3.5–6)
LEFT VENTRICULAR MASS: 113.16 G
LV LATERAL E/E' RATIO: 7.55 M/S
LV SEPTAL E/E' RATIO: 13.83 M/S
MV A" WAVE DURATION": 9.42 MSEC
MV PEAK A VEL: 0.86 M/S
MV PEAK E VEL: 0.83 M/S
MV STENOSIS PRESSURE HALF TIME: 38.99 MS
MV VALVE AREA P 1/2 METHOD: 5.64 CM2
PISA TR MAX VEL: 2.51 M/S
PULM VEIN S/D RATIO: 1.23
PV PEAK D VEL: 0.43 M/S
PV PEAK S VEL: 0.53 M/S
RA MAJOR: 4.79 CM
RA PRESSURE: 3 MMHG
RA WIDTH: 3.43 CM
RIGHT VENTRICULAR END-DIASTOLIC DIMENSION: 3.05 CM
RV TISSUE DOPPLER FREE WALL SYSTOLIC VELOCITY 1 (APICAL 4 CHAMBER VIEW): 13.22 CM/S
SINUS: 3.44 CM
STJ: 2.52 CM
TDI LATERAL: 0.11 M/S
TDI SEPTAL: 0.06 M/S
TDI: 0.09 M/S
TR MAX PG: 25 MMHG
TRICUSPID ANNULAR PLANE SYSTOLIC EXCURSION: 2.24 CM
TV REST PULMONARY ARTERY PRESSURE: 28 MMHG

## 2022-09-16 PROCEDURE — 93306 ECHO (CUPID ONLY): ICD-10-PCS | Mod: 26,,, | Performed by: INTERNAL MEDICINE

## 2022-09-16 PROCEDURE — 93306 TTE W/DOPPLER COMPLETE: CPT

## 2022-09-16 PROCEDURE — 93306 TTE W/DOPPLER COMPLETE: CPT | Mod: 26,,, | Performed by: INTERNAL MEDICINE

## 2022-09-17 ENCOUNTER — PATIENT MESSAGE (OUTPATIENT)
Dept: OBSTETRICS AND GYNECOLOGY | Facility: CLINIC | Age: 47
End: 2022-09-17
Payer: MEDICAID

## 2022-09-17 ENCOUNTER — HOSPITAL ENCOUNTER (EMERGENCY)
Facility: OTHER | Age: 47
Discharge: HOME | End: 2022-09-17
Attending: EMERGENCY MEDICINE
Payer: MEDICAID

## 2022-09-17 VITALS
HEART RATE: 87 BPM | OXYGEN SATURATION: 99 % | DIASTOLIC BLOOD PRESSURE: 59 MMHG | RESPIRATION RATE: 18 BRPM | SYSTOLIC BLOOD PRESSURE: 123 MMHG | TEMPERATURE: 98 F

## 2022-09-17 DIAGNOSIS — R10.2 ACUTE PELVIC PAIN: Primary | ICD-10-CM

## 2022-09-17 DIAGNOSIS — R10.30 LOWER ABDOMINAL PAIN: ICD-10-CM

## 2022-09-17 LAB
ALBUMIN SERPL BCP-MCNC: 3.8 G/DL (ref 3.5–5.2)
ALP SERPL-CCNC: 101 U/L (ref 55–135)
ALT SERPL W/O P-5'-P-CCNC: 19 U/L (ref 10–44)
ANION GAP SERPL CALC-SCNC: 10 MMOL/L (ref 8–16)
AST SERPL-CCNC: 23 U/L (ref 10–40)
B-HCG UR QL: NEGATIVE
BACTERIA #/AREA URNS HPF: ABNORMAL /HPF
BASOPHILS # BLD AUTO: 0.05 K/UL (ref 0–0.2)
BASOPHILS NFR BLD: 0.4 % (ref 0–1.9)
BILIRUB SERPL-MCNC: 0.5 MG/DL (ref 0.1–1)
BILIRUB UR QL STRIP: NEGATIVE
BUN SERPL-MCNC: 21 MG/DL (ref 6–20)
CALCIUM SERPL-MCNC: 9.2 MG/DL (ref 8.7–10.5)
CHLORIDE SERPL-SCNC: 105 MMOL/L (ref 95–110)
CLARITY UR: CLEAR
CO2 SERPL-SCNC: 22 MMOL/L (ref 23–29)
COLOR UR: YELLOW
CREAT SERPL-MCNC: 1 MG/DL (ref 0.5–1.4)
CTP QC/QA: YES
DIFFERENTIAL METHOD: ABNORMAL
EOSINOPHIL # BLD AUTO: 0.1 K/UL (ref 0–0.5)
EOSINOPHIL NFR BLD: 0.8 % (ref 0–8)
ERYTHROCYTE [DISTWIDTH] IN BLOOD BY AUTOMATED COUNT: 13.7 % (ref 11.5–14.5)
EST. GFR  (NO RACE VARIABLE): >60 ML/MIN/1.73 M^2
GLUCOSE SERPL-MCNC: 106 MG/DL (ref 70–110)
GLUCOSE UR QL STRIP: NEGATIVE
HCT VFR BLD AUTO: 38 % (ref 37–48.5)
HGB BLD-MCNC: 12.1 G/DL (ref 12–16)
HGB UR QL STRIP: ABNORMAL
IMM GRANULOCYTES # BLD AUTO: 0.03 K/UL (ref 0–0.04)
IMM GRANULOCYTES NFR BLD AUTO: 0.2 % (ref 0–0.5)
KETONES UR QL STRIP: NEGATIVE
LEUKOCYTE ESTERASE UR QL STRIP: NEGATIVE
LYMPHOCYTES # BLD AUTO: 2.4 K/UL (ref 1–4.8)
LYMPHOCYTES NFR BLD: 17.9 % (ref 18–48)
MCH RBC QN AUTO: 28.9 PG (ref 27–31)
MCHC RBC AUTO-ENTMCNC: 31.8 G/DL (ref 32–36)
MCV RBC AUTO: 91 FL (ref 82–98)
MICROSCOPIC COMMENT: ABNORMAL
MONOCYTES # BLD AUTO: 0.9 K/UL (ref 0.3–1)
MONOCYTES NFR BLD: 6.8 % (ref 4–15)
NEUTROPHILS # BLD AUTO: 9.7 K/UL (ref 1.8–7.7)
NEUTROPHILS NFR BLD: 73.9 % (ref 38–73)
NITRITE UR QL STRIP: NEGATIVE
NRBC BLD-RTO: 0 /100 WBC
PH UR STRIP: 6 [PH] (ref 5–8)
PLATELET # BLD AUTO: 336 K/UL (ref 150–450)
PMV BLD AUTO: 9.8 FL (ref 9.2–12.9)
POTASSIUM SERPL-SCNC: 4.5 MMOL/L (ref 3.5–5.1)
PROT SERPL-MCNC: 8.3 G/DL (ref 6–8.4)
PROT UR QL STRIP: NEGATIVE
RBC # BLD AUTO: 4.19 M/UL (ref 4–5.4)
RBC #/AREA URNS HPF: 6 /HPF (ref 0–4)
SODIUM SERPL-SCNC: 137 MMOL/L (ref 136–145)
SP GR UR STRIP: 1.02 (ref 1–1.03)
SQUAMOUS #/AREA URNS HPF: 3 /HPF
URN SPEC COLLECT METH UR: ABNORMAL
UROBILINOGEN UR STRIP-ACNC: NEGATIVE EU/DL
WBC # BLD AUTO: 13.18 K/UL (ref 3.9–12.7)
WBC #/AREA URNS HPF: 0 /HPF (ref 0–5)

## 2022-09-17 PROCEDURE — 81025 URINE PREGNANCY TEST: CPT | Performed by: EMERGENCY MEDICINE

## 2022-09-17 PROCEDURE — 99285 EMERGENCY DEPT VISIT HI MDM: CPT | Mod: 25

## 2022-09-17 PROCEDURE — 85025 COMPLETE CBC W/AUTO DIFF WBC: CPT | Performed by: EMERGENCY MEDICINE

## 2022-09-17 PROCEDURE — 96375 TX/PRO/DX INJ NEW DRUG ADDON: CPT

## 2022-09-17 PROCEDURE — 80053 COMPREHEN METABOLIC PANEL: CPT | Performed by: EMERGENCY MEDICINE

## 2022-09-17 PROCEDURE — 81000 URINALYSIS NONAUTO W/SCOPE: CPT | Performed by: EMERGENCY MEDICINE

## 2022-09-17 PROCEDURE — 25500020 PHARM REV CODE 255: Performed by: EMERGENCY MEDICINE

## 2022-09-17 PROCEDURE — 63600175 PHARM REV CODE 636 W HCPCS: Performed by: EMERGENCY MEDICINE

## 2022-09-17 PROCEDURE — 96374 THER/PROPH/DIAG INJ IV PUSH: CPT | Mod: 59

## 2022-09-17 PROCEDURE — 25000003 PHARM REV CODE 250: Performed by: EMERGENCY MEDICINE

## 2022-09-17 RX ORDER — IBUPROFEN 600 MG/1
600 TABLET ORAL EVERY 6 HOURS PRN
Qty: 20 TABLET | Refills: 0 | Status: SHIPPED | OUTPATIENT
Start: 2022-09-17 | End: 2022-12-13

## 2022-09-17 RX ORDER — HYDROMORPHONE HYDROCHLORIDE 1 MG/ML
0.5 INJECTION, SOLUTION INTRAMUSCULAR; INTRAVENOUS; SUBCUTANEOUS
Status: COMPLETED | OUTPATIENT
Start: 2022-09-17 | End: 2022-09-17

## 2022-09-17 RX ORDER — KETOROLAC TROMETHAMINE 30 MG/ML
15 INJECTION, SOLUTION INTRAMUSCULAR; INTRAVENOUS
Status: COMPLETED | OUTPATIENT
Start: 2022-09-17 | End: 2022-09-17

## 2022-09-17 RX ORDER — OXYCODONE AND ACETAMINOPHEN 5; 325 MG/1; MG/1
1 TABLET ORAL
Status: COMPLETED | OUTPATIENT
Start: 2022-09-17 | End: 2022-09-17

## 2022-09-17 RX ORDER — OXYCODONE AND ACETAMINOPHEN 5; 325 MG/1; MG/1
1 TABLET ORAL EVERY 6 HOURS PRN
Qty: 12 TABLET | Refills: 0 | Status: SHIPPED | OUTPATIENT
Start: 2022-09-17 | End: 2023-07-06

## 2022-09-17 RX ADMIN — IOHEXOL 100 ML: 350 INJECTION, SOLUTION INTRAVENOUS at 09:09

## 2022-09-17 RX ADMIN — KETOROLAC TROMETHAMINE 15 MG: 30 INJECTION, SOLUTION INTRAMUSCULAR; INTRAVENOUS at 08:09

## 2022-09-17 RX ADMIN — OXYCODONE HYDROCHLORIDE AND ACETAMINOPHEN 1 TABLET: 5; 325 TABLET ORAL at 11:09

## 2022-09-17 RX ADMIN — HYDROMORPHONE HYDROCHLORIDE 0.5 MG: 1 INJECTION, SOLUTION INTRAMUSCULAR; INTRAVENOUS; SUBCUTANEOUS at 06:09

## 2022-09-17 NOTE — ED PROVIDER NOTES
46-year-old female with history of hypertension signed out by Dr. Ho after she woke up with diffuse lower abdominal pain.  Patient has had heavy and irregular cycles for the past few months, so has hysteroscopy and D&C planned with gyn upcoming.  However she states she is not bleeding now and has not had this pain with her cycle in the past.  Initial labs with WBC 13, no other acute findings.  Pelvic ultrasound done that shows fibroids but no other clear etiology for pain.  CT done to rule out any sign of intra-abdominal infection and with no acute process.  Patient still has some pain after Toradol, will give short course of Percocet for severe pain, continue NSAIDs for likely pain related to fibroids.  Her cycle should be starting soon so this may be pain related to this as well as fibroids, no other clear etiology, no indication further emergent workup, no sign of UTI.  Patient has scheduled gyn procedure in 4 days, will follow-up then and return to the ED for any worsening pain or other new concerning symptoms     Socrates Ribeiro MD  09/17/22 6926

## 2022-09-17 NOTE — ED PROVIDER NOTES
Source of History:  Patient    Chief complaint:  Abdominal Pain (C/o sudden onset lower abd pain this am. Woke pt from sleep. Denies n/v/d, no fever reported)      HPI:  Rama Blair is a 46 y.o. female presenting with sudden onset of pelvic pain, hour and a half ago, but her from sleep.  No recent similar pain.  She has seen her OBGYN recently as she had not had menses for year and then they restarted in July and August.  States that she is due to have AD and C for this by her OBGYN in the near future.  She is not currently having vaginal bleeding or discharge.  Denies concern for STD.  No fevers or chills.  No other recent symptoms.    This is the extent to the patients complaints today here in the emergency department.    ROS: As per HPI and below:     General: No fever.  No chills.  Eyes: No visual changes.  ENT: No sore throat. No ear pain  Head: No headache.    Respiratory: No shortness of breath.  Cardiovascular: No chest pain.  Abdomen:  As per HPI  Genito-Urinary: No abnormal urination.  No dysuria or frequency.  Neurologic: No focal weakness.  No numbness.  MSK:  Chronic back pain, unchanged.  Integument: No rashes or lesions.  Hematologic: No easy bruising.  Endocrine: No excessive thirst or urination.      Review of patient's allergies indicates:  No Known Allergies    PMH:  As per HPI and below:  Past Medical History:   Diagnosis Date    Anemia, unspecified     Constipation     GI Dr Reece 2014    COVID-19     Elevated platelet count 03/11/2021    Refer hematol 3/2021    Familial hypercholesteremia     per digital lipid    Gastritis 07/30/2021 2021    Gastroesophageal reflux disease without esophagitis 08/28/2018    She states she had EGD in past    Grief 09/19/2017    mom passed 2017    HTN (hypertension), benign 03/10/2015    Digital med 12/20    Intestinal erosis 09/17/2021    CS 2021    Iron deficiency anemia secondary to inadequate dietary iron intake 08/26/2021    Start OTC 8/21     Knee pain, left 2012    scope, Dr Reyes, injury at work, treadmill helps with stiffness    Low blood potassium 07/30/2021    avoid HCTZ    Mixed hyperlipidemia 08/29/2013    Morbid obesity 02/23/2015    bariatrics not covered by insurance    SOB (shortness of breath)     Spinal cord stimulator status 08/28/2018    In back for L knee pain, Dr Kessler surgery & pain mgmt, Lyrica or Hydrocodone at night    Thyroid disease      Past Surgical History:   Procedure Laterality Date    COLONOSCOPY N/A 9/17/2021    Procedure: COLONOSCOPY;  Surgeon: Keith Mckeon MD;  Location: [facility] (4TH FLR);  Service: Endoscopy;  Laterality: N/A;  prep ins. emailed / COVID [place] [facility] [place] [facility] [place]14[place]21- ER[facility]    DILATION AND CURETTAGE OF UTERUS      ESOPHAGOGASTRODUODENOSCOPY N/A 7/27/2021    Procedure: ESOPHAGOGASTRODUODENOSCOPY (EGD);  Surgeon: Keith Mckeon MD;  Location: [facility] (4TH FLR);  Service: Endoscopy;  Laterality: N/A;  COVID test 7/24 [facility]-    KNEE ARTHROSCOPY      L, Dasa    KNEE SURGERY      SKIN GRAFT      left finger due to injury    SPINAL CORD STIMULATOR IMPLANT      to L knee, Dr Kessler    TUBAL LIGATION         Social History     Tobacco Use    Smoking status: Never    Smokeless tobacco: Never   Substance Use Topics    Alcohol use: No    Drug use: No       Physical Exam:    BP (!) 184/88   Pulse 85   Temp 98.2 °F (36.8 °C)   Resp 18   LMP 05/19/2021 (Approximate)   SpO2 98%   Breastfeeding No   Nursing note and vital signs reviewed.  Appearance:  Uncomfortable appearing, mild distress.  Eyes: No conjunctival injection.  No pallor or icterus  ENT: Oropharynx clear.  Moist mucous membranes  Chest/ Respiratory: Clear to auscultation bilaterally.  Good air movement.  No wheezes.  No rhonchi. No rales. No accessory muscle use.  Cardiovascular: Regular rate and rhythm.  No murmurs. No gallops. No rubs.  Abdomen: Soft.  Not distended.  Tenderness with some voluntary guarding throughout the lower abdomen,  greatest in the suprapubic region.  No focal tenderness at McBurney's point.  Negative Navarrete sign. No rebound. Non-peritoneal.  Bowel sounds normal throughout  Musculoskeletal: Good range of motion all joints.  No deformities.  Neck supple.  No meningismus.  Skin: No rashes seen.  Good turgor.  No abrasions.  No ecchymoses.  Neurologic: Motor intact.  Sensation intact.  Cerebellar intact.  Cranial nerves intact.  Mental Status:  Alert and oriented x 3.  Appropriate, conversant.    Labs that have been ordered have been independently reviewed and interpreted by myself.        MDM/ Differential Dx:    46 y.o. female with acute onset pelvic pain.  Will obtain laboratory studies, ultrasound to rule out ovarian torsion, hemorrhagic cyst or other worrisome intra-abdominal cause of the pain.  Care is turned over at 6:00 a.m. pending results of labs, ultrasound and likely recommendations by her gyn service.                 Diagnostic Impression:    1. Acute pelvic pain                  Naseem Ho II, MD  09/17/22 0622

## 2022-09-17 NOTE — ED TRIAGE NOTES
Pt c/o lower abdominal pain woke patient up. Pt is suppose to have GYN procedure on 9/21 to scrape uterus

## 2022-09-20 ENCOUNTER — PATIENT MESSAGE (OUTPATIENT)
Dept: OBSTETRICS AND GYNECOLOGY | Facility: CLINIC | Age: 47
End: 2022-09-20
Payer: MEDICAID

## 2022-09-20 ENCOUNTER — TELEPHONE (OUTPATIENT)
Dept: OBSTETRICS AND GYNECOLOGY | Facility: CLINIC | Age: 47
End: 2022-09-20
Payer: MEDICAID

## 2022-09-21 ENCOUNTER — ANESTHESIA (OUTPATIENT)
Dept: SURGERY | Facility: OTHER | Age: 47
End: 2022-09-21
Payer: MEDICAID

## 2022-09-21 ENCOUNTER — TELEPHONE (OUTPATIENT)
Dept: OBSTETRICS AND GYNECOLOGY | Facility: CLINIC | Age: 47
End: 2022-09-21
Payer: MEDICAID

## 2022-09-21 ENCOUNTER — HOSPITAL ENCOUNTER (OUTPATIENT)
Facility: OTHER | Age: 47
Discharge: HOME | End: 2022-09-21
Attending: OBSTETRICS & GYNECOLOGY | Admitting: OBSTETRICS & GYNECOLOGY
Payer: MEDICAID

## 2022-09-21 VITALS
SYSTOLIC BLOOD PRESSURE: 105 MMHG | WEIGHT: 283 LBS | OXYGEN SATURATION: 95 % | DIASTOLIC BLOOD PRESSURE: 64 MMHG | TEMPERATURE: 99 F | HEIGHT: 67 IN | BODY MASS INDEX: 44.42 KG/M2 | HEART RATE: 81 BPM | RESPIRATION RATE: 17 BRPM

## 2022-09-21 DIAGNOSIS — N95.0 PMB (POSTMENOPAUSAL BLEEDING): ICD-10-CM

## 2022-09-21 DIAGNOSIS — Z98.890 STATUS POST HYSTEROSCOPY: Primary | ICD-10-CM

## 2022-09-21 PROCEDURE — 63600175 PHARM REV CODE 636 W HCPCS: Performed by: STUDENT IN AN ORGANIZED HEALTH CARE EDUCATION/TRAINING PROGRAM

## 2022-09-21 PROCEDURE — 00952 ANES VAG PX HYSTSC&/HSG: CPT | Performed by: OBSTETRICS & GYNECOLOGY

## 2022-09-21 PROCEDURE — 36000706: Performed by: OBSTETRICS & GYNECOLOGY

## 2022-09-21 PROCEDURE — 25000003 PHARM REV CODE 250: Performed by: ANESTHESIOLOGY

## 2022-09-21 PROCEDURE — 37000009 HC ANESTHESIA EA ADD 15 MINS: Performed by: OBSTETRICS & GYNECOLOGY

## 2022-09-21 PROCEDURE — 71000016 HC POSTOP RECOV ADDL HR: Performed by: OBSTETRICS & GYNECOLOGY

## 2022-09-21 PROCEDURE — 37000008 HC ANESTHESIA 1ST 15 MINUTES: Performed by: OBSTETRICS & GYNECOLOGY

## 2022-09-21 PROCEDURE — 88305 TISSUE EXAM BY PATHOLOGIST: ICD-10-PCS | Mod: 26,,, | Performed by: PATHOLOGY

## 2022-09-21 PROCEDURE — 71000033 HC RECOVERY, INTIAL HOUR: Performed by: OBSTETRICS & GYNECOLOGY

## 2022-09-21 PROCEDURE — 58558 HYSTEROSCOPY BIOPSY: CPT | Mod: ,,, | Performed by: OBSTETRICS & GYNECOLOGY

## 2022-09-21 PROCEDURE — 27201423 OPTIME MED/SURG SUP & DEVICES STERILE SUPPLY: Performed by: OBSTETRICS & GYNECOLOGY

## 2022-09-21 PROCEDURE — 58558 PR HYSTEROSCOPY,W/ENDO BX: ICD-10-PCS | Mod: ,,, | Performed by: OBSTETRICS & GYNECOLOGY

## 2022-09-21 PROCEDURE — 71000015 HC POSTOP RECOV 1ST HR: Performed by: OBSTETRICS & GYNECOLOGY

## 2022-09-21 PROCEDURE — 25000003 PHARM REV CODE 250: Performed by: STUDENT IN AN ORGANIZED HEALTH CARE EDUCATION/TRAINING PROGRAM

## 2022-09-21 PROCEDURE — 25000003 PHARM REV CODE 250: Performed by: OBSTETRICS & GYNECOLOGY

## 2022-09-21 PROCEDURE — 63600175 PHARM REV CODE 636 W HCPCS: Performed by: ANESTHESIOLOGY

## 2022-09-21 PROCEDURE — 88305 TISSUE EXAM BY PATHOLOGIST: CPT | Mod: 26,,, | Performed by: PATHOLOGY

## 2022-09-21 PROCEDURE — 36000707: Performed by: OBSTETRICS & GYNECOLOGY

## 2022-09-21 PROCEDURE — 71000039 HC RECOVERY, EACH ADD'L HOUR: Performed by: OBSTETRICS & GYNECOLOGY

## 2022-09-21 PROCEDURE — 88305 TISSUE EXAM BY PATHOLOGIST: CPT | Performed by: PATHOLOGY

## 2022-09-21 RX ORDER — SODIUM CHLORIDE, SODIUM LACTATE, POTASSIUM CHLORIDE, CALCIUM CHLORIDE 600; 310; 30; 20 MG/100ML; MG/100ML; MG/100ML; MG/100ML
INJECTION, SOLUTION INTRAVENOUS CONTINUOUS
Status: DISCONTINUED | OUTPATIENT
Start: 2022-09-21 | End: 2022-09-21 | Stop reason: HOSPADM

## 2022-09-21 RX ORDER — DIPHENHYDRAMINE HYDROCHLORIDE 50 MG/ML
25 INJECTION INTRAMUSCULAR; INTRAVENOUS EVERY 6 HOURS PRN
Status: DISCONTINUED | OUTPATIENT
Start: 2022-09-21 | End: 2022-09-21 | Stop reason: HOSPADM

## 2022-09-21 RX ORDER — MEPERIDINE HYDROCHLORIDE 25 MG/ML
12.5 INJECTION INTRAMUSCULAR; INTRAVENOUS; SUBCUTANEOUS ONCE AS NEEDED
Status: DISCONTINUED | OUTPATIENT
Start: 2022-09-21 | End: 2022-09-21 | Stop reason: HOSPADM

## 2022-09-21 RX ORDER — PROCHLORPERAZINE EDISYLATE 5 MG/ML
5 INJECTION INTRAMUSCULAR; INTRAVENOUS EVERY 30 MIN PRN
Status: DISCONTINUED | OUTPATIENT
Start: 2022-09-21 | End: 2022-09-21 | Stop reason: HOSPADM

## 2022-09-21 RX ORDER — ONDANSETRON 2 MG/ML
4 INJECTION INTRAMUSCULAR; INTRAVENOUS EVERY 4 HOURS PRN
Status: DISCONTINUED | OUTPATIENT
Start: 2022-09-21 | End: 2022-09-21 | Stop reason: HOSPADM

## 2022-09-21 RX ORDER — LIDOCAINE HYDROCHLORIDE 10 MG/ML
0.5 INJECTION, SOLUTION EPIDURAL; INFILTRATION; INTRACAUDAL; PERINEURAL ONCE
Status: DISCONTINUED | OUTPATIENT
Start: 2022-09-21 | End: 2022-09-21 | Stop reason: HOSPADM

## 2022-09-21 RX ORDER — PREGABALIN 75 MG/1
75 CAPSULE ORAL ONCE
Status: COMPLETED | OUTPATIENT
Start: 2022-09-21 | End: 2022-09-21

## 2022-09-21 RX ORDER — IBUPROFEN 600 MG/1
600 TABLET ORAL EVERY 6 HOURS PRN
Qty: 60 TABLET | Refills: 2 | Status: SHIPPED | OUTPATIENT
Start: 2022-09-21 | End: 2022-12-15

## 2022-09-21 RX ORDER — HYDROMORPHONE HYDROCHLORIDE 2 MG/ML
0.2 INJECTION, SOLUTION INTRAMUSCULAR; INTRAVENOUS; SUBCUTANEOUS
Status: DISCONTINUED | OUTPATIENT
Start: 2022-09-21 | End: 2022-09-21 | Stop reason: HOSPADM

## 2022-09-21 RX ORDER — KETOROLAC TROMETHAMINE 30 MG/ML
15 INJECTION, SOLUTION INTRAMUSCULAR; INTRAVENOUS EVERY 6 HOURS PRN
Status: DISCONTINUED | OUTPATIENT
Start: 2022-09-21 | End: 2022-09-21 | Stop reason: HOSPADM

## 2022-09-21 RX ORDER — PROPOFOL 10 MG/ML
VIAL (ML) INTRAVENOUS
Status: DISCONTINUED | OUTPATIENT
Start: 2022-09-21 | End: 2022-09-21

## 2022-09-21 RX ORDER — HYDROMORPHONE HYDROCHLORIDE 2 MG/ML
0.4 INJECTION, SOLUTION INTRAMUSCULAR; INTRAVENOUS; SUBCUTANEOUS EVERY 5 MIN PRN
Status: DISCONTINUED | OUTPATIENT
Start: 2022-09-21 | End: 2022-09-21 | Stop reason: HOSPADM

## 2022-09-21 RX ORDER — LIDOCAINE HYDROCHLORIDE 20 MG/ML
INJECTION INTRAVENOUS
Status: DISCONTINUED | OUTPATIENT
Start: 2022-09-21 | End: 2022-09-21

## 2022-09-21 RX ORDER — SODIUM CHLORIDE 0.9 % (FLUSH) 0.9 %
3 SYRINGE (ML) INJECTION
Status: DISCONTINUED | OUTPATIENT
Start: 2022-09-21 | End: 2022-09-21 | Stop reason: HOSPADM

## 2022-09-21 RX ORDER — KETOROLAC TROMETHAMINE 30 MG/ML
INJECTION, SOLUTION INTRAMUSCULAR; INTRAVENOUS
Status: DISCONTINUED | OUTPATIENT
Start: 2022-09-21 | End: 2022-09-21

## 2022-09-21 RX ORDER — ONDANSETRON 2 MG/ML
INJECTION INTRAMUSCULAR; INTRAVENOUS
Status: DISCONTINUED | OUTPATIENT
Start: 2022-09-21 | End: 2022-09-21

## 2022-09-21 RX ORDER — FENTANYL CITRATE 50 UG/ML
INJECTION, SOLUTION INTRAMUSCULAR; INTRAVENOUS
Status: DISCONTINUED | OUTPATIENT
Start: 2022-09-21 | End: 2022-09-21

## 2022-09-21 RX ORDER — SODIUM CHLORIDE 9 MG/ML
INJECTION, SOLUTION INTRAVENOUS CONTINUOUS
Status: DISCONTINUED | OUTPATIENT
Start: 2022-09-21 | End: 2022-09-21 | Stop reason: HOSPADM

## 2022-09-21 RX ORDER — ACETAMINOPHEN 500 MG
1000 TABLET ORAL EVERY 6 HOURS PRN
Status: DISCONTINUED | OUTPATIENT
Start: 2022-09-21 | End: 2022-09-21 | Stop reason: HOSPADM

## 2022-09-21 RX ORDER — DEXMEDETOMIDINE HYDROCHLORIDE 100 UG/ML
INJECTION, SOLUTION INTRAVENOUS
Status: DISCONTINUED | OUTPATIENT
Start: 2022-09-21 | End: 2022-09-21

## 2022-09-21 RX ORDER — OXYCODONE HYDROCHLORIDE 5 MG/1
5 TABLET ORAL
Status: DISCONTINUED | OUTPATIENT
Start: 2022-09-21 | End: 2022-09-21 | Stop reason: HOSPADM

## 2022-09-21 RX ORDER — DEXAMETHASONE SODIUM PHOSPHATE 4 MG/ML
INJECTION, SOLUTION INTRA-ARTICULAR; INTRALESIONAL; INTRAMUSCULAR; INTRAVENOUS; SOFT TISSUE
Status: DISCONTINUED | OUTPATIENT
Start: 2022-09-21 | End: 2022-09-21

## 2022-09-21 RX ORDER — MUPIROCIN 20 MG/G
OINTMENT TOPICAL
Status: DISCONTINUED | OUTPATIENT
Start: 2022-09-21 | End: 2022-09-21 | Stop reason: HOSPADM

## 2022-09-21 RX ORDER — ACETAMINOPHEN 500 MG
1000 TABLET ORAL
Status: COMPLETED | OUTPATIENT
Start: 2022-09-21 | End: 2022-09-21

## 2022-09-21 RX ADMIN — DEXAMETHASONE SODIUM PHOSPHATE 8 MG: 4 INJECTION, SOLUTION INTRAMUSCULAR; INTRAVENOUS at 01:09

## 2022-09-21 RX ADMIN — MUPIROCIN: 20 OINTMENT TOPICAL at 12:09

## 2022-09-21 RX ADMIN — SODIUM CHLORIDE, SODIUM LACTATE, POTASSIUM CHLORIDE, AND CALCIUM CHLORIDE: 600; 310; 30; 20 INJECTION, SOLUTION INTRAVENOUS at 12:09

## 2022-09-21 RX ADMIN — PREGABALIN 75 MG: 75 CAPSULE ORAL at 12:09

## 2022-09-21 RX ADMIN — LIDOCAINE HYDROCHLORIDE 100 MG: 20 INJECTION, SOLUTION INTRAVENOUS at 01:09

## 2022-09-21 RX ADMIN — GLYCOPYRROLATE 0.2 MG: 0.2 INJECTION, SOLUTION INTRAMUSCULAR; INTRAVITREAL at 01:09

## 2022-09-21 RX ADMIN — FENTANYL CITRATE 100 MCG: 50 INJECTION, SOLUTION INTRAMUSCULAR; INTRAVENOUS at 12:09

## 2022-09-21 RX ADMIN — PROPOFOL 300 MG: 10 INJECTION, EMULSION INTRAVENOUS at 01:09

## 2022-09-21 RX ADMIN — KETOROLAC TROMETHAMINE 30 MG: 30 INJECTION, SOLUTION INTRAMUSCULAR; INTRAVENOUS at 01:09

## 2022-09-21 RX ADMIN — OXYCODONE 5 MG: 5 TABLET ORAL at 02:09

## 2022-09-21 RX ADMIN — ONDANSETRON HYDROCHLORIDE 4 MG: 2 INJECTION INTRAMUSCULAR; INTRAVENOUS at 12:09

## 2022-09-21 RX ADMIN — ACETAMINOPHEN 1000 MG: 500 TABLET, FILM COATED ORAL at 12:09

## 2022-09-21 RX ADMIN — DEXMEDETOMIDINE HYDROCHLORIDE 20 MCG: 100 INJECTION, SOLUTION, CONCENTRATE INTRAVENOUS at 01:09

## 2022-09-21 NOTE — ANESTHESIA PROCEDURE NOTES
Intubation    Date/Time: 9/21/2022 1:06 PM  Performed by: Joseph Fisher CRNA  Authorized by: Lauren Herman MD     Intubation:     Induction:  Intravenous    Intubated:  Postinduction    Mask Ventilation:  N/a    Attempts:  1    Attempted By:  Student    Difficult Airway Encountered?: No      Complications:  None    Airway Device:  Supraglottic airway/LMA    Airway Device Size:  4.0    Style/Cuff Inflation:  Uncuffed    Placement Verified By:  Capnometry    Complicating Factors:  None    Findings Post-Intubation:  BS equal bilateral and atraumatic/condition of teeth unchanged

## 2022-09-21 NOTE — ANESTHESIA POSTPROCEDURE EVALUATION
Anesthesia Post Evaluation    Patient: Rama Blair    Procedure(s) Performed: Procedure(s) (LRB):  HYSTEROSCOPY, WITH DILATION AND CURETTAGE OF UTERUS (N/A)    Final Anesthesia Type: general      Patient location during evaluation: PACU  Patient participation: Yes- Able to Participate  Level of consciousness: awake and alert  Post-procedure vital signs: reviewed and stable  Pain management: adequate  Airway patency: patent    PONV status at discharge: No PONV  Anesthetic complications: no      Cardiovascular status: blood pressure returned to baseline and stable  Respiratory status: unassisted, spontaneous ventilation and room air  Hydration status: euvolemic  Follow-up not needed.          Vitals Value Taken Time   /73 09/21/22 1423   Temp 36.9 °C (98.5 °F) 09/21/22 1335   Pulse 73 09/21/22 1425   Resp 16 09/21/22 1417   SpO2 97 % 09/21/22 1425   Vitals shown include unvalidated device data.      No case tracking events are documented in the log.      Pain/Tomasa Score: Pain Rating Prior to Med Admin: 7 (9/21/2022  2:17 PM)  Tomasa Score: 8 (9/21/2022  2:05 PM)

## 2022-09-21 NOTE — INTERVAL H&P NOTE
The patient has been examined and the H&P has been reviewed:    I concur with the findings and no changes have occurred since H&P was written.    Surgery risks, benefits and alternative options discussed and understood by patient/family.      Katherine Boecking MD   Ob/Gyn PGY-3      There are no hospital problems to display for this patient.

## 2022-09-21 NOTE — OP NOTE
OPERATIVE REPORT    09/21/2022     PREOPERATIVE DIAGNOSIS  1. Post-Menopausal bleeding    POSTOPERATIVE DIAGNOSIS  1.  S/P D&C/Hysteroscopy    PROCEDURE:  1. Hysteroscopy  2. Dilation and curettage     SURGEON: Dr. Kim Juarez MD    ASSISTANT: Katie Boecking, MD PGY-3    ANESTHESIA: General    COMPLICATIONS: None    EBL: Minimal < 20 cc    IV FLUIDS: See anesthesia report    URINE OUTPUT: 10  cc drained via in-and-out catheterization prior to procedure    Hysteroscopy fluid deficit: 80 cc    FINDINGS:  Normal appearing external genitalia and cervix. Cervix without descensus to the introitus. Uterus sounded to 8 cm. Serially dilated to 18 using Armando dilators. Proliferative appearing endometrium with polypoid tissue in posterior aspect of uterus.    SPECIMENS:  1. Endometrial curettage      PROCEDURE:   Patient was taken to the operating room where general anesthesia was administered and found to be adequate.  She was placed in the dorsal lithotomy position using Kenenth stirrups, then prepped and draped in the usual sterile fashion. Bladder was drained via in-and-out catheterization prior to procedure.  A surgical timeout was performed with patient's name, date of birth, procedure to be performed, and allergies verbalized. All OR staff in agreement. No preoperative antibiotics were administered as none were indicated.    Attention was then turned to the vagina where a sterile speculum was placed in the vagina.  The anterior lip of the cervix was grasped with a single tooth tenaculum.  The uterus was sounded to approximately 8 cm. The cervix was serially dilated to 18 using Armando dilators. The 5mm hysteroscope was advanced through the cervical os and the uterus was distended with normal saline.  The endometrium was inspected and found to be proliferative with polypoid tissue noted on posterior aspect of uterus.  The tubal ostia were identified without difficulty, and appeared normal. The hysteroscope was withdrawn  without difficulty.     The uterus was then curetted in a clockwise fashion until gritty feeling was noted in all aspects of the uterus. The endometrial scrapings were sent to pathology. The tenaculum was removed and hemostasis was noted at the puncture sites in the cervix.     Sponge and instrument counts were correct x 2. The patient tolerated the procedure well and was awakened without difficulty. She was taken to the recovery room in stable condition.    Katherine Boecking MD  Ob/Gyn PGY-3

## 2022-09-21 NOTE — TRANSFER OF CARE
"Anesthesia Transfer of Care Note    Patient: Rama Blair    Procedure(s) Performed: Procedure(s) (LRB):  HYSTEROSCOPY, WITH DILATION AND CURETTAGE OF UTERUS (N/A)    Patient location: PACU    Anesthesia Type: general    Transport from OR: Transported from OR on 6-10 L/min O2 by face mask with adequate spontaneous ventilation    Post pain: adequate analgesia    Post assessment: no apparent anesthetic complications and tolerated procedure well    Post vital signs: stable    Level of consciousness: responds to stimulation    Nausea/Vomiting: no nausea/vomiting    Complications: none    Transfer of care protocol was followed      Last vitals:   Visit Vitals  BP (!) 146/71 (BP Location: Left arm, Patient Position: Lying)   Pulse 80   Temp 36.9 °C (98.5 °F) (Oral)   Resp 16   Ht 5' 7" (1.702 m)   Wt 128.4 kg (283 lb)   LMP 05/19/2021 (Approximate)   SpO2 98%   Breastfeeding No   BMI 44.32 kg/m²     "

## 2022-09-21 NOTE — DISCHARGE INSTRUCTIONS
Home Care Instruction D&C Hysteroscopy             ACTIVITY LEVEL:  If you received sedation and/or an anesthetic, you may feel sleepy for several hours. Rest until you feel more  awake. Gradually resume your normal activities.    DIET:  At home, continue with liquids. If there is no nausea, you may eat a soft diet and gradually resume a regular diet.    BATHING:  You may shower  as desired in one day.  You should avoid tub baths, hot tubs and swimming pools until seen by your physician for a post-op follow up.    CARE:  You can expect watery or bloody vaginal discharge for several days. Do not use tampons, please only use pads. Sexual activity is restricted as advised by your doctor.    MEDICATIONS:  You will receive instructions for any pain and/or antibiotic prescriptions. Pain medication should be taken only if needed and as directed. Antibiotics, if ordered, should be taken as directed until the entire prescription is completed.    ADDITIONAL INFORMATION:  __________________________________________________________________________________________  WHEN TO CALL THE DOCTOR:   For any heavy vaginal bleeding   Fever over 101°F (38.4°C)   Any lower abdominal pain not relieved by the pain mediation  RETURN APPOINTMENT:  __________________________________________________________________________________________  FOR EMERGENCIES:  If any unusual problems or difficulties occur, contact Dr. Juarez or the resident at [phone] (page ) or the Clinic office, [phone].       Anesthesia: After Your Surgery  Youve just had surgery. During surgery, you received medication called anesthesia to keep you comfortable and pain-free. After surgery, you may experience some pain or nausea. This is common. Here are some tips for feeling better and recovering after surgery.    Going home  Your doctor or nurse will show you how to take care of yourself when you go home. He or she will also answer your questions. Have an  adult family member or friend drive you home. For the first 24 hours after your surgery:  Do not drive or use heavy equipment.  Do not make important decisions or sign legal documents.  Avoid alcohol.  Have someone stay with you, if needed. He or she can watch for problems and help keep you safe.  Take your time getting up from a seated or lying position. You may experience dizziness for 24 hours  Be sure to keep all follow-up appointments with your doctor. And rest after your procedure for as long as your doctor tells you to.    Coping with pain  If you have pain after surgery, pain medication will help you feel better. Take it as directed, before pain becomes severe. Also, ask your doctor or pharmacist about other ways to control pain, such as with heat, ice, and relaxation. And follow any other instructions your surgeon or nurse gives you.    URINARY RETENTION  Should you experience a decrease in your urine output or are unable to urinate following surgery, this can be due to the medications given during surgery.  We recommend you going to the nearest Emergency Department.    Tips for taking pain medication  To get the best relief possible, remember these points:  Pain medications can upset your stomach. Taking them with a little food may help.  Most pain relievers taken by mouth need at least 20 to 30 minutes to take effect.  Taking medication on a schedule can help you remember to take it. Try to time your medication so that you can take it before beginning an activity, such as dressing, walking, or sitting down for dinner.  Constipation is a common side effect of pain medications. Contact your doctor before taking any medications like laxatives or stool softeners to help relieve constipation. Also ask about any dietary restrictions, because drinking lots of fluids and eating foods like fruits and vegetables that are high in fiber can also help. Remember, dont take laxatives unless your surgeon has prescribed  them.  Mixing alcohol and pain medication can cause dizziness and slow your breathing. It can even be fatal. Dont drink alcohol while taking pain medication.  Pain medication can slow your reflexes. Dont drive or operate machinery while taking pain medication.  If your health care provider tells you to take acetaminophen to help relieve your pain, ask him or her how much you are supposed to take each day. (Acetaminophen is the generic name for Tylenol and other brand-name pain relievers.) Acetaminophen or other pain relievers may interact with your prescription medicines or other over-the-counter (OTC) drugs. Some prescription medications contain acetaminophen along with other active ingredients. Using both prescription and OTC acetaminophen for pain can cause you to overdose. The FDA recommends that you read the labels on your OTC medications carefully. This will help you to clearly understand the list of active ingredients, dosing instructions, and any warnings. It may also help you avoid taking too much acetaminophen. If you have questions or don't understand the information, ask your pharmacist or health care provider to explain it to you before you take the OTC medication.    Managing nausea  Some people have an upset stomach after surgery. This is often due to anesthesia, pain, pain medications, or the stress of surgery. The following tips will help you manage nausea and get good nutrition as you recover. If you were on a special diet before surgery, ask your doctor if you should follow it during recovery. These tips may help:  Dont push yourself to eat. Your body will tell you when to eat and how much.  Start off with clear liquids and soup. They are easier to digest.  Progress to semi-solid foods (mashed potatoes, applesauce, and gelatin) as you feel ready.  Slowly move to solid foods. Dont eat fatty, rich, or spicy foods at first.  Dont force yourself to have three large meals a day. Instead, eat  smaller amounts more often.  Take pain medications with a small amount of solid food, such as crackers or toast to avoid nausea.      Call your surgeon if    You feel too sleepy, dizzy, or groggy (medication may be too strong).  You have side effects like nausea, vomiting, or skin changes (rash, itching, or hives).

## 2022-09-21 NOTE — PLAN OF CARE
Rama Johnnie has met all discharge criteria from Phase II. Vital Signs are stable, ambulating  without difficulty. Discharge instructions given, patient verbalized understanding. Discharged from facility via wheelchair in stable condition.

## 2022-09-21 NOTE — DISCHARGE SUMMARY
[facility]  Brief Op Note/Discharge Note  Short Stay    Admit Date: 9/21/2022    Discharge Date: 09/21/2022    Attending Physician: Amber Stratton MD     Surgery Date: 9/21/2022     Surgeon(s) and Role:     * Amber Stratton MD - Primary    Assisting Surgeon: None    Pre-op Diagnosis:  PMB (postmenopausal bleeding) [N95.0]    Post-op Diagnosis:  Post-Op Diagnosis Codes:     * PMB (postmenopausal bleeding) [N95.0]    Procedure(s) (LRB):  HYSTEROSCOPY, WITH DILATION AND CURETTAGE OF UTERUS (N/A)    Anesthesia: Choice    Findings/Key Components: Uterus sounded to 8 cm. Cervix did not have descensus. Proliferative appearing endometrial with small posterior likely polyp. Sharp curettage performed in all four quadrants. Excellent hemostasis following procedure. Fluid defect 80 cc.     Estimated Blood Loss: Minimal < 20 cc         Specimens:   Specimen (24h ago, onward)       Start     Ordered    09/21/22 1322  Specimen to Pathology, Surgery Gynecology and Obstetrics  Once        Comments: Pre-op Diagnosis: PMB (postmenopausal bleeding) [N95.0]Procedure(s):HYSTEROSCOPY, WITH DILATION AND CURETTAGE OF UTERUS Number of specimens: 1Name of specimens: 1. ENDOMETRIAL CURETTAGE     References:    Click here for ordering Quick Tip   Question Answer Comment   Procedure Type: Gynecology and Obstetrics    Specimen Class: Complex case/Special    Which provider would you like to cc? AMBER STRATTON    Release to patient Immediate        09/21/22 1324                    Discharge Provider: Katherine C Boecking    Diagnoses:  Active Hospital Problems    Diagnosis  POA    *Status post hysteroscopy D&C [Z98.890]  Not Applicable      Resolved Hospital Problems   No resolved problems to display.       Discharged Condition: good    Hospital Course:   Patient was admitted for outpatient procedure as above, and tolerated the procedure well with no complications. Please see operative report for further details. Following the procedure,  the patient was awakened from anesthesia and transferred to the recovery area in stable condition. She was discharged to home once ambulating, voiding, tolerating PO intake, and pain was well-controlled. Patient was given routine post-op instructions and prescriptions for pain medication to take as needed. Patient instructed to follow up with Dr. Kim Juarez as scheduled.    Final Diagnoses: Same as principal problem.    Disposition: Home or Self Care    Follow up/Patient Instructions:    Medications:  Reconciled Home Medications:      Medication List        CHANGE how you take these medications      * ibuprofen 600 MG tablet  Commonly known as: ADVIL,MOTRIN  Take 1 tablet (600 mg total) by mouth every 6 (six) hours as needed for Pain.  What changed: Another medication with the same name was added. Make sure you understand how and when to take each.     * ibuprofen 600 MG tablet  Commonly known as: ADVIL,MOTRIN  Take 1 tablet (600 mg total) by mouth every 6 (six) hours as needed for Pain.  What changed: You were already taking a medication with the same name, and this prescription was added. Make sure you understand how and when to take each.           * This list has 2 medication(s) that are the same as other medications prescribed for you. Read the directions carefully, and ask your doctor or other care provider to review them with you.                CONTINUE taking these medications      albuterol 90 mcg/actuation inhaler  Commonly known as: PROVENTIL/VENTOLIN HFA  Inhale 1 puff into the lungs every 6 (six) hours as needed for Wheezing. Rescue     blood pressure monitor XL arm size (OP)  Commonly known as: [employer] EASE  by Other route as needed for High Blood Pressure.     ezetimibe 10 mg tablet  Commonly known as: ZETIA  Take 1 tablet (10 mg total) by mouth once daily.     hydroCHLOROthiazide 25 MG tablet  Commonly known as: HYDRODIURIL  TAKE 1 TABLET(25 MG) BY MOUTH EVERY DAY     losartan 100 MG  tablet  Commonly known as: COZAAR  Take 1 tablet (100 mg total) by mouth once daily.     oxyCODONE-acetaminophen 5-325 mg per tablet  Commonly known as: PERCOCET  Take 1 tablet by mouth every 6 (six) hours as needed for Pain.     pantoprazole 40 MG tablet  Commonly known as: PROTONIX  TAKE 1 TABLET(40 MG) BY MOUTH EVERY DAY     potassium chloride SA 15 MEQ tablet  Commonly known as: KLOR-CON M15  Take 1 tablet (15 mEq total) by mouth once daily.     pregabalin 100 MG capsule  Commonly known as: LYRICA  Take 100 mg by mouth 2 (two) times daily.     rosuvastatin 40 MG Tab  Commonly known as: CRESTOR  Take 1 tablet (40 mg total) by mouth every evening.            Discharge Procedure Orders   Diet general     Lifting restrictions   Order Comments: LIFTING:  No lifting greater than 15 pounds for six weeks.     PELVIC REST:  No douching, tampons, or intercourse for 6 weeks.  If prescribed, vaginal estrogen cream may be used during the postoperative period.     Other restrictions (specify):   Order Comments: DRIVING:  No driving while on narcotics. Driving may be resumed initially with a competent passenger one to two weeks after surgery if no longer taking narcotics.     EXERCISE:  For six weeks your exercise should be limited to walking. You may walk as far as you wish, as long as you increase your level of exertion gradually and avoid slippery surfaces. You may climb stairs as needed to get around, but should not use stair climbing for exercise.     Leave dressing on - Keep it clean, dry, and intact until clinic visit     Remove dressing in 24 hours   Order Comments: If you have a bandage on wound, you may remove it the day after dismissal.  If you had steri-strips remove them once they begin to peel off (usually 2 weeks). Keep incision clean and dry.  Inspect the incision daily for signs and symptoms of infection.     Call MD for:  temperature >100.4     Call MD for:  persistent nausea and vomiting     Call MD for:   severe uncontrolled pain     Call MD for:  difficulty breathing, headache or visual disturbances     Call MD for:  redness, tenderness, or signs of infection (pain, swelling, redness, odor or green/yellow discharge around incision site)     Call MD for:  hives     Call MD for:   Order Comments: inability to void,urine is ketchup colored or you have large clots, vaginal bleeding is heavier than a period.    VAGINAL DISCHARGE: You may develop a vaginal discharge and intermittent vaginal spotting after surgery and up to 6 weeks postoperatively.  The discharge may have an odor and may change in color but it is normal.  This is due to dissolving stiches.  Contact your surgical team if you develop vaginal or vulvar irritation along with a discharge.  Also contact your surgical team if you have vaginal discharge that smells like urine or stool.    PAIN MEDICATIONS:     Take your pain medications as instructed. It is best to take pain medications before your pain becomes severe. This will allow you to take less medication yet have better pain relief. For the first 2 or 3 days it may be helpful to take your pain medications on a regular schedule (e.g. every 4 to 6 hours). This will help you to keep your pain under better control. You should then begin to take fewer medications each day until you no longer need them. Do not take pain medication on an empty stomach. This may lead to nausea and vomiting.    CONSTIPATION REMEDIES: Patients are often constipated after surgery or with use of oral narcotic medicine. You should continue to take the stool softener, Senokot-S during the next six weeks, and consume adequate amounts of water.  If you have not had a bowel movement for 3 days after dismissal, or are uncomfortable and unable to pass stool, please try one or all of the following measures:  1.  Milk of Magnesia - 30 cc by mouth every 12 hours   2.  Dulcolax suppository - One suppository per rectum every 4-6 hours   3.   Metamucil, Fibercon or other bulk former - use as directed  4.  Fleets Enema  5.  Prunes or Prune juice    If you continue to have constipation after trying the above remedies, you should contact your surgical team using the contact information listed above     Activity as tolerated   Order Comments: Return to normal activity slowly as you feel able.  For 6 weeks your exercise should be limited to walking.  You may walk as far as you wish, as long as you increase your level of exertion gradually and avoid slippery surfaces.     Shower on day dressing removed (No bath)   Order Comments: You may shower at any time but should avoid immersing any abdominal incisions in water for at least 2 weeks after surgery or until the wound is completely healed.  If given, please shower with Hibaclens soap until bottle is completely finish           Katherine Boecking MD  Ob/Gyn PGY-3

## 2022-09-23 ENCOUNTER — PATIENT OUTREACH (OUTPATIENT)
Dept: EMERGENCY MEDICINE | Facility: OTHER | Age: 47
End: 2022-09-23
Payer: MEDICAID

## 2022-09-23 NOTE — PROGRESS NOTES
Mary Hughes LPN  ED Navigator  Emergency Department    [place] [facility] [place]  Role: Community Health Worker    Date: 09/23/2022  Patient Name: Rama Blair  MRN: [id number]  PCP: Hiwot Shah MD    Assessment:     Rama Blair is a 46 y.o. female who has presented to ED for Abdominal Pain. Patient has visited the ED 2 times in the past 3 months. Patient did not contact PCP.     ED Navigator Initial Assessment    ED Navigator Enrollment Documentation  Consent to Services  Does patient consent to completing the assessment?: Yes  Contact  Method of Initial Contact: Phone  Transportation  Does the patient have issues with Transportation?: No  Does the patient have transportation to and from healthcare appointments?: Yes  Insurance Coverage  Do you have coverage/adequate coverage?: Yes  Type/kind of coverage: [facility]  Is patient able to afford co-pays/deductibles?: Yes  Is patient able to afford HME or supplies?: Yes  Does patient have an established [facility] PCP?: Yes  Able to access?: Yes  Does the patient have a lack of adequate coverage?: No  Specialist Appointment  Did the patient come to the ED to see a specialist?: No  Does the patient have a pending specialist referral?: No  Does the patient have a specialist appointment made?: Yes  Is the patient able to access a timely specialist appointment?: Yes  PCP Follow Up Appointment  Has the patient had an appointment with a primary care provider in the past year?: Yes  Approximate date: 6/30/22  Provider: Hiwot Shah MD  Does the patient have a follow up appontment with a PCP?: No  When was the last time you saw your PCP?: 6/30/22  Why does the patient not have a follow up scheduled?: Other (see comments) (Comment: Has not scheduled)  Medications  Is patient able to afford medication?: Yes  Is patient unable to get medication due to lack of transportation?: No  Psychological  Does the patient have psycho-social concerns?:  No  Food  Does the patient have concerns about food?: No  Communication/Education  Does the patient have limited English proficiency/English not primary language?: No  Does patient have low literacy and/or low health literacy?: Yes  Does patient have concerns with care?: No  Does patient have dissatisfaction with care?: No  Other Financial Concerns  Does the patient have immediate financial distress?: No  Does the patient have general financial concerns?: Yes  Other Social Barriers/Concerns  Does the patient have any additional barriers or concerns?: Unable to afford utilities  Primary Barrier  Barriers identified: Cognitive barrier (health literacy, language and communication, etc.)  Root Cause of ED Utilization: Patient Knowledge/Low Health Literacy  Plan to address Patient Knowledge/Low Health Literacy: Provided information for [facility] 24/7 Nurse triage line [phone] or [phone] ([phone])  Next steps: Provided Education  Was education/educational materials provided surrounding PCP services/creating a medical home?: Yes Was education verbal or written?: Written     Was education/educational materials provided surrounding low cost, healthy foods?: Yes Was education verbal or written?: Written     Was education/educational materials provided surrounding other items? If so, use comment to explain.: Yes (Comment: Utility assistance resources) Was education verbal or written?: Written   Plan: Utility payment assistance resource given for their region  Expected Date of Follow Up 1: 11/21/22  Additional Documentation: Spoke with patient today and she was agreeable to enrollment and subsequent F/U calls. Pt. Denies any psychosocial needs at this time. Pt. Is established with Dr. Shah and was last seen on 6/30/22. Pt. Denies smoking. Pt. Would like resources for utility assistance. [facility] [employer] form, [place] Virtual Visit Flyer, [facility] PCP scheduling assistance, [facility] on call RN#, and Heart  Healthy Diet education all sent to email as well.          Social History     Socioeconomic History    Marital status:    Occupational History    Occupation: works as survelliance agent   Tobacco Use    Smoking status: Never    Smokeless tobacco: Never   Substance and Sexual Activity    Alcohol use: No    Drug use: No    Sexual activity: Yes     Partners: Male     Birth control/protection: None     Social Determinants of Health     Financial Resource Strain: Low Risk     Difficulty of Paying Living Expenses: Not very hard   Food Insecurity: No Food Insecurity    Worried About Running Out of Food in the Last Year: Never true    Ran Out of Food in the Last Year: Never true   Transportation Needs: No Transportation Needs    Lack of Transportation (Medical): No    Lack of Transportation (Non-Medical): No   Stress: No Stress Concern Present    Feeling of Stress : Not at all   Social Connections: Unknown    Frequency of Communication with Friends and Family: More than three times a week    Frequency of Social Gatherings with Friends and Family: More than three times a week    Marital Status:    Housing Stability: Unknown    Unable to Pay for Housing in the Last Year: No    Unstable Housing in the Last Year: No       Plan:   Spoke with patient today and she was agreeable to enrollment and subsequent F/U calls. Pt. Denies any psychosocial needs at this time. Pt. Is established with Dr. Shah and was last seen on 6/30/22. Pt. Denies smoking. Pt. Would like resources for utility assistance. [facility] [employer] form, [place] Virtual Visit Flyer, [facility] P[facility] scheduling assistance, [facility] on call RN#, and Heart Healthy Diet education all sent to email as well.

## 2022-09-26 ENCOUNTER — TELEPHONE (OUTPATIENT)
Dept: OBSTETRICS AND GYNECOLOGY | Facility: CLINIC | Age: 47
End: 2022-09-26
Payer: MEDICAID

## 2022-09-26 LAB
FINAL PATHOLOGIC DIAGNOSIS: NORMAL
GROSS: NORMAL
Lab: NORMAL

## 2022-09-26 NOTE — TELEPHONE ENCOUNTER
S/w pt  States she is doing well since surgery   Denies any pelvic pain or abnormal bleeding  Instructed to call office if needed

## 2022-11-10 ENCOUNTER — OFFICE VISIT (OUTPATIENT)
Dept: OBSTETRICS AND GYNECOLOGY | Facility: CLINIC | Age: 47
End: 2022-11-10
Attending: OBSTETRICS & GYNECOLOGY
Payer: MEDICAID

## 2022-11-10 VITALS
HEIGHT: 67 IN | WEIGHT: 276.88 LBS | BODY MASS INDEX: 43.46 KG/M2 | SYSTOLIC BLOOD PRESSURE: 122 MMHG | DIASTOLIC BLOOD PRESSURE: 62 MMHG

## 2022-11-10 DIAGNOSIS — Z98.890 STATUS POST HYSTEROSCOPY: Primary | ICD-10-CM

## 2022-11-10 PROCEDURE — 3074F PR MOST RECENT SYSTOLIC BLOOD PRESSURE < 130 MM HG: ICD-10-PCS | Mod: CPTII,,, | Performed by: OBSTETRICS & GYNECOLOGY

## 2022-11-10 PROCEDURE — 99213 OFFICE O/P EST LOW 20 MIN: CPT | Mod: PBBFAC,PN | Performed by: OBSTETRICS & GYNECOLOGY

## 2022-11-10 PROCEDURE — 1159F MED LIST DOCD IN RCRD: CPT | Mod: CPTII,,, | Performed by: OBSTETRICS & GYNECOLOGY

## 2022-11-10 PROCEDURE — 3044F PR MOST RECENT HEMOGLOBIN A1C LEVEL <7.0%: ICD-10-PCS | Mod: CPTII,,, | Performed by: OBSTETRICS & GYNECOLOGY

## 2022-11-10 PROCEDURE — 99213 OFFICE O/P EST LOW 20 MIN: CPT | Mod: S$PBB,,, | Performed by: OBSTETRICS & GYNECOLOGY

## 2022-11-10 PROCEDURE — 99999 PR PBB SHADOW E&M-EST. PATIENT-LVL III: CPT | Mod: PBBFAC,,, | Performed by: OBSTETRICS & GYNECOLOGY

## 2022-11-10 PROCEDURE — 3008F BODY MASS INDEX DOCD: CPT | Mod: CPTII,,, | Performed by: OBSTETRICS & GYNECOLOGY

## 2022-11-10 PROCEDURE — 99999 PR PBB SHADOW E&M-EST. PATIENT-LVL III: ICD-10-PCS | Mod: PBBFAC,,, | Performed by: OBSTETRICS & GYNECOLOGY

## 2022-11-10 PROCEDURE — 99213 PR OFFICE/OUTPT VISIT, EST, LEVL III, 20-29 MIN: ICD-10-PCS | Mod: S$PBB,,, | Performed by: OBSTETRICS & GYNECOLOGY

## 2022-11-10 PROCEDURE — 3008F PR BODY MASS INDEX (BMI) DOCUMENTED: ICD-10-PCS | Mod: CPTII,,, | Performed by: OBSTETRICS & GYNECOLOGY

## 2022-11-10 PROCEDURE — 3078F DIAST BP <80 MM HG: CPT | Mod: CPTII,,, | Performed by: OBSTETRICS & GYNECOLOGY

## 2022-11-10 PROCEDURE — 4010F PR ACE/ARB THEARPY RXD/TAKEN: ICD-10-PCS | Mod: CPTII,,, | Performed by: OBSTETRICS & GYNECOLOGY

## 2022-11-10 PROCEDURE — 1159F PR MEDICATION LIST DOCUMENTED IN MEDICAL RECORD: ICD-10-PCS | Mod: CPTII,,, | Performed by: OBSTETRICS & GYNECOLOGY

## 2022-11-10 PROCEDURE — 3044F HG A1C LEVEL LT 7.0%: CPT | Mod: CPTII,,, | Performed by: OBSTETRICS & GYNECOLOGY

## 2022-11-10 PROCEDURE — 3074F SYST BP LT 130 MM HG: CPT | Mod: CPTII,,, | Performed by: OBSTETRICS & GYNECOLOGY

## 2022-11-10 PROCEDURE — 3078F PR MOST RECENT DIASTOLIC BLOOD PRESSURE < 80 MM HG: ICD-10-PCS | Mod: CPTII,,, | Performed by: OBSTETRICS & GYNECOLOGY

## 2022-11-10 PROCEDURE — 4010F ACE/ARB THERAPY RXD/TAKEN: CPT | Mod: CPTII,,, | Performed by: OBSTETRICS & GYNECOLOGY

## 2022-11-10 RX ORDER — POTASSIUM CITRATE 15 MEQ/1
1 TABLET, EXTENDED RELEASE ORAL DAILY
COMMUNITY
Start: 2022-10-30 | End: 2022-11-28

## 2022-11-10 RX ORDER — ATORVASTATIN CALCIUM 80 MG/1
80 TABLET, FILM COATED ORAL DAILY
COMMUNITY
Start: 2022-10-10 | End: 2022-11-16

## 2022-11-10 NOTE — PROGRESS NOTES
"Past medical, surgical, social, family, and obstetric histories; medications; prior records and results; and available outside records were reviewed and updated in the EMR.  Pertinent findings were noted below.    Reason for Visit   Post-op Evaluation (D&C HYST 22)    HPI   46 y.o. female  here for post-op eval after Hysteroscope D&C. Pathology showed no evidence of atypia or malignancy. Pt with no complaints today. Denies any bleeding since surgery. Denies any pain since surgery.     Patient's last menstrual period was 2021 (approximate).      Contraception: None  Pap: 2022, NILM/HPV(-)  Mammogram: BIRADS1, T-C=5.05%    Exam   /62   Ht 5' 7" (1.702 m)   Wt 125.6 kg (276 lb 14.4 oz)   LMP 2021 (Approximate)   BMI 43.37 kg/m²     Physical Exam  Constitutional:       Appearance: She is well-developed.   Genitourinary:      Vulva and bladder normal.      Right Labia: No rash or tenderness.     Left Labia: No tenderness or rash.     No vaginal discharge or tenderness.      No cervical motion tenderness.      Uterus is not tender.   HENT:      Head: Normocephalic.   Eyes:      General: No scleral icterus.     Extraocular Movements: Extraocular movements intact.   Cardiovascular:      Rate and Rhythm: Normal rate.   Pulmonary:      Effort: Pulmonary effort is normal. No respiratory distress.   Abdominal:      General: There is no distension.      Palpations: Abdomen is soft. There is no hepatomegaly or mass.      Tenderness: There is no abdominal tenderness.   Musculoskeletal:         General: Normal range of motion.      Cervical back: Normal range of motion.   Neurological:      Mental Status: She is alert and oriented to person, place, and time.   Skin:     General: Skin is warm and dry.   Psychiatric:         Mood and Affect: Mood normal.         Behavior: Behavior normal.   Vitals reviewed. Exam conducted with a chaperone present.     Assessment and Plan   Status post " hysteroscopy D&C      Doing well post-op   Pelvic exam benign as above   RTC PRN or for annual     Jane Curiel MD  OB-GYN, PGY-1

## 2022-11-14 ENCOUNTER — LAB VISIT (OUTPATIENT)
Dept: LAB | Facility: HOSPITAL | Age: 47
End: 2022-11-14
Attending: FAMILY MEDICINE
Payer: MEDICAID

## 2022-11-14 DIAGNOSIS — E78.2 MIXED HYPERLIPIDEMIA: ICD-10-CM

## 2022-11-14 LAB
CHOLEST SERPL-MCNC: 192 MG/DL (ref 120–199)
CHOLEST/HDLC SERPL: 3.4 {RATIO} (ref 2–5)
HDLC SERPL-MCNC: 56 MG/DL (ref 40–75)
HDLC SERPL: 29.2 % (ref 20–50)
LDLC SERPL CALC-MCNC: 121.8 MG/DL (ref 63–159)
NONHDLC SERPL-MCNC: 136 MG/DL
TRIGL SERPL-MCNC: 71 MG/DL (ref 30–150)

## 2022-11-14 PROCEDURE — 80061 LIPID PANEL: CPT | Performed by: FAMILY MEDICINE

## 2022-11-14 PROCEDURE — 36415 COLL VENOUS BLD VENIPUNCTURE: CPT | Mod: PN | Performed by: FAMILY MEDICINE

## 2022-11-21 ENCOUNTER — PATIENT OUTREACH (OUTPATIENT)
Dept: EMERGENCY MEDICINE | Facility: OTHER | Age: 47
End: 2022-11-21

## 2022-12-13 ENCOUNTER — HOSPITAL ENCOUNTER (EMERGENCY)
Facility: OTHER | Age: 47
Discharge: HOME | End: 2022-12-13
Attending: EMERGENCY MEDICINE
Payer: MEDICAID

## 2022-12-13 VITALS
RESPIRATION RATE: 20 BRPM | HEART RATE: 68 BPM | OXYGEN SATURATION: 100 % | DIASTOLIC BLOOD PRESSURE: 65 MMHG | TEMPERATURE: 99 F | SYSTOLIC BLOOD PRESSURE: 122 MMHG

## 2022-12-13 DIAGNOSIS — R07.9 CHEST PAIN: ICD-10-CM

## 2022-12-13 DIAGNOSIS — S46.812A STRAIN OF LEFT TRAPEZIUS MUSCLE, INITIAL ENCOUNTER: Primary | ICD-10-CM

## 2022-12-13 LAB
ALBUMIN SERPL BCP-MCNC: 3.7 G/DL (ref 3.5–5.2)
ALP SERPL-CCNC: 80 U/L (ref 55–135)
ALT SERPL W/O P-5'-P-CCNC: 27 U/L (ref 10–44)
ANION GAP SERPL CALC-SCNC: 12 MMOL/L (ref 8–16)
AST SERPL-CCNC: 22 U/L (ref 10–40)
BASOPHILS # BLD AUTO: 0.05 K/UL (ref 0–0.2)
BASOPHILS NFR BLD: 0.6 % (ref 0–1.9)
BILIRUB SERPL-MCNC: 0.6 MG/DL (ref 0.1–1)
BNP SERPL-MCNC: <10 PG/ML (ref 0–99)
BUN SERPL-MCNC: 17 MG/DL (ref 6–20)
CALCIUM SERPL-MCNC: 9.4 MG/DL (ref 8.7–10.5)
CHLORIDE SERPL-SCNC: 102 MMOL/L (ref 95–110)
CO2 SERPL-SCNC: 27 MMOL/L (ref 23–29)
CREAT SERPL-MCNC: 1.1 MG/DL (ref 0.5–1.4)
DIFFERENTIAL METHOD: NORMAL
EOSINOPHIL # BLD AUTO: 0.2 K/UL (ref 0–0.5)
EOSINOPHIL NFR BLD: 2.2 % (ref 0–8)
ERYTHROCYTE [DISTWIDTH] IN BLOOD BY AUTOMATED COUNT: 13.6 % (ref 11.5–14.5)
EST. GFR  (NO RACE VARIABLE): >60 ML/MIN/1.73 M^2
GLUCOSE SERPL-MCNC: 91 MG/DL (ref 70–110)
HCT VFR BLD AUTO: 38.4 % (ref 37–48.5)
HCV AB SERPL QL IA: NEGATIVE
HGB BLD-MCNC: 12.5 G/DL (ref 12–16)
HIV 1+2 AB+HIV1 P24 AG SERPL QL IA: NEGATIVE
IMM GRANULOCYTES # BLD AUTO: 0.02 K/UL (ref 0–0.04)
IMM GRANULOCYTES NFR BLD AUTO: 0.3 % (ref 0–0.5)
LYMPHOCYTES # BLD AUTO: 3.1 K/UL (ref 1–4.8)
LYMPHOCYTES NFR BLD: 39.3 % (ref 18–48)
MCH RBC QN AUTO: 29.1 PG (ref 27–31)
MCHC RBC AUTO-ENTMCNC: 32.6 G/DL (ref 32–36)
MCV RBC AUTO: 89 FL (ref 82–98)
MONOCYTES # BLD AUTO: 0.5 K/UL (ref 0.3–1)
MONOCYTES NFR BLD: 6.2 % (ref 4–15)
NEUTROPHILS # BLD AUTO: 4 K/UL (ref 1.8–7.7)
NEUTROPHILS NFR BLD: 51.4 % (ref 38–73)
NRBC BLD-RTO: 0 /100 WBC
PLATELET # BLD AUTO: 355 K/UL (ref 150–450)
PMV BLD AUTO: 10.1 FL (ref 9.2–12.9)
POTASSIUM SERPL-SCNC: 3.2 MMOL/L (ref 3.5–5.1)
PROT SERPL-MCNC: 7.9 G/DL (ref 6–8.4)
RBC # BLD AUTO: 4.3 M/UL (ref 4–5.4)
SODIUM SERPL-SCNC: 141 MMOL/L (ref 136–145)
TROPONIN I SERPL DL<=0.01 NG/ML-MCNC: <0.006 NG/ML (ref 0–0.03)
WBC # BLD AUTO: 7.77 K/UL (ref 3.9–12.7)

## 2022-12-13 PROCEDURE — 80053 COMPREHEN METABOLIC PANEL: CPT | Performed by: NURSE PRACTITIONER

## 2022-12-13 PROCEDURE — 63600175 PHARM REV CODE 636 W HCPCS: Performed by: NURSE PRACTITIONER

## 2022-12-13 PROCEDURE — 84484 ASSAY OF TROPONIN QUANT: CPT | Performed by: NURSE PRACTITIONER

## 2022-12-13 PROCEDURE — 85025 COMPLETE CBC W/AUTO DIFF WBC: CPT | Performed by: NURSE PRACTITIONER

## 2022-12-13 PROCEDURE — 25000003 PHARM REV CODE 250: Performed by: NURSE PRACTITIONER

## 2022-12-13 PROCEDURE — 93010 EKG 12-LEAD: ICD-10-PCS | Mod: ,,, | Performed by: INTERNAL MEDICINE

## 2022-12-13 PROCEDURE — 93010 ELECTROCARDIOGRAM REPORT: CPT | Mod: ,,, | Performed by: INTERNAL MEDICINE

## 2022-12-13 PROCEDURE — 93005 ELECTROCARDIOGRAM TRACING: CPT

## 2022-12-13 PROCEDURE — 86803 HEPATITIS C AB TEST: CPT | Performed by: EMERGENCY MEDICINE

## 2022-12-13 PROCEDURE — 83880 ASSAY OF NATRIURETIC PEPTIDE: CPT | Performed by: NURSE PRACTITIONER

## 2022-12-13 PROCEDURE — 99285 EMERGENCY DEPT VISIT HI MDM: CPT | Mod: 25

## 2022-12-13 PROCEDURE — 96372 THER/PROPH/DIAG INJ SC/IM: CPT | Performed by: NURSE PRACTITIONER

## 2022-12-13 PROCEDURE — 87389 HIV-1 AG W/HIV-1&-2 AB AG IA: CPT | Performed by: EMERGENCY MEDICINE

## 2022-12-13 RX ORDER — METHOCARBAMOL 750 MG/1
1500 TABLET, FILM COATED ORAL 3 TIMES DAILY
Qty: 30 TABLET | Refills: 0 | Status: SHIPPED | OUTPATIENT
Start: 2022-12-13 | End: 2022-12-18

## 2022-12-13 RX ORDER — LIDOCAINE 50 MG/G
1 PATCH TOPICAL ONCE
Status: DISCONTINUED | OUTPATIENT
Start: 2022-12-13 | End: 2022-12-13 | Stop reason: HOSPADM

## 2022-12-13 RX ORDER — IBUPROFEN 600 MG/1
600 TABLET ORAL EVERY 6 HOURS PRN
Qty: 20 TABLET | Refills: 0 | Status: SHIPPED | OUTPATIENT
Start: 2022-12-13 | End: 2022-12-15

## 2022-12-13 RX ORDER — METHOCARBAMOL 750 MG/1
1500 TABLET, FILM COATED ORAL
Status: COMPLETED | OUTPATIENT
Start: 2022-12-13 | End: 2022-12-13

## 2022-12-13 RX ORDER — KETOROLAC TROMETHAMINE 30 MG/ML
15 INJECTION, SOLUTION INTRAMUSCULAR; INTRAVENOUS
Status: COMPLETED | OUTPATIENT
Start: 2022-12-13 | End: 2022-12-13

## 2022-12-13 RX ORDER — LIDOCAINE 50 MG/G
1 PATCH TOPICAL DAILY
Qty: 15 PATCH | Refills: 0 | Status: SHIPPED | OUTPATIENT
Start: 2022-12-13 | End: 2023-07-06

## 2022-12-13 RX ORDER — POTASSIUM CHLORIDE 20 MEQ/1
40 TABLET, EXTENDED RELEASE ORAL
Status: COMPLETED | OUTPATIENT
Start: 2022-12-13 | End: 2022-12-13

## 2022-12-13 RX ADMIN — KETOROLAC TROMETHAMINE 15 MG: 30 INJECTION, SOLUTION INTRAMUSCULAR; INTRAVENOUS at 02:12

## 2022-12-13 RX ADMIN — LIDOCAINE 1 PATCH: 50 PATCH CUTANEOUS at 02:12

## 2022-12-13 RX ADMIN — POTASSIUM CHLORIDE 40 MEQ: 1500 TABLET, EXTENDED RELEASE ORAL at 03:12

## 2022-12-13 RX ADMIN — METHOCARBAMOL 1500 MG: 750 TABLET ORAL at 02:12

## 2022-12-13 NOTE — ED PROVIDER NOTES
Source of History:  Patient    Chief complaint:  Back Pain (Upper part of back on left side), Chest Pain (2 days ago, LSCP), Shortness of Breath (With a walking), and Fatigue      HPI:  Rama Blair is a 47 y.o. female presenting with chest pain for the past 2 days and back pain that started last night.  Patient states that the left-sided chest pain feels like a tightness and has been intermittent for the past 2 days.  Last night the pain started on the left side of her upper back and neck.  She denies injury trauma, sleeping funny.  No associated headache, lightheadedness, shortness of breath, nausea or vomiting.  Although triage note reports shortness of breath with walking patient clarifies that the chest pain worsens and feels like she short of breath when she ambulate.    This is the extent to the patients complaints today here in the emergency department.    ROS: As per HPI and below:  General: No fever.  No chills.  Eyes: No visual changes.  ENT: No sore throat. No ear pain  Head: No headache.    Chest: No shortness of breath.  Cardiovascular: + chest pain.  Abdomen: No abdominal pain.  No nausea or vomiting.  Genito-Urinary: No abnormal urination.  Neurologic: No focal weakness.  No numbness.  MSK: + back pain.  Integument: No rashes or lesions.  Hematologic: No easy bruising.  Endocrine: No excessive thirst or urination.    Review of patient's allergies indicates:  No Known Allergies    PMH:  As per HPI and below:  Past Medical History:   Diagnosis Date    Anemia, unspecified     Constipation     GI Dr Reece 2014    COVID-19     Elevated platelet count 03/11/2021    Refer hematol 3/2021    Familial hypercholesteremia     per digital lipid    Gastritis 07/30/2021 2021    Gastroesophageal reflux disease without esophagitis 08/28/2018    She states she had EGD in past    Grief 09/19/2017    mom passed 2017    HTN (hypertension), benign 03/10/2015    Digital med 12/20    Intestinal erosis  09/17/2021    CS 2021    Iron deficiency anemia secondary to inadequate dietary iron intake 08/26/2021    Start OTC 8/21    Knee pain, left 2012    scope, Dr Reyes, injury at work, treadmill helps with stiffness    Low blood potassium 07/30/2021    avoid HCTZ    Mixed hyperlipidemia 08/29/2013    Morbid obesity 02/23/2015    bariatrics not covered by insurance    SOB (shortness of breath)     Spinal cord stimulator status 08/28/2018    In back for L knee pain, Dr Kessler surgery & pain mgmt, Lyrica or Hydrocodone at night    Thyroid disease      Past Surgical History:   Procedure Laterality Date    COLONOSCOPY N/A 9/17/2021    Procedure: COLONOSCOPY;  Surgeon: Keith Mckeon MD;  Location: [facility] (4TH FLR);  Service: Endoscopy;  Laterality: N/A;  prep ins. emailed / COVID screening [facility] [place] [facility] 2[facility] 9/14/21- ERW    DILATION AND CURETTAGE OF UTERUS      ESOPHAGOGASTRODUODENOSCOPY N/A 7/27/2021    Procedure: ESOPHAGOGASTRODUODENOSCOPY (EGD);  Surgeon: Keith Mckeon MD;  [facility]  Service: Endoscopy;  Laterality: N/A;  COVID test 7/24 [facility]-    HYSTEROSCOPY WITH DILATION AND CURETTAGE OF UTERUS N/A 9/21/2022    Procedure: HYSTEROSCOPY, WITH DILATION AND CURETTAGE OF UTERUS;  Surgeon: Kim Juarez MD;  Location: [facility];  Service: OB/GYN;  Laterality: N/A;    KNEE ARTHROSCOPY      Eric MOHAMUD    KNEE SURGERY      SKIN GRAFT      left finger due to injury    SPINAL CORD STIMULATOR IMPLANT      to L knee, Dr Kessler    TUBAL LIGATION         Social History     Tobacco Use    Smoking status: Never    Smokeless tobacco: Never   Substance Use Topics    Alcohol use: No    Drug use: No       Physical Exam:    /65   Pulse 68   Temp 98.7 °F (37.1 °C) (Oral)   Resp 20   LMP 05/19/2021 (Approximate)   SpO2 100%   Nursing note and vital signs reviewed.  Appearance: Uncomfortable appearing  Eyes: No conjunctival injection.  ENT: Oropharynx clear.    Chest/ Respiratory: Clear to auscultation  bilaterally.  Good air movement.  No wheezes.  No rhonchi. No rales. No accessory muscle use.  Cardiovascular: Regular rate and rhythm.  No murmurs. No gallops. No rubs.  Abdomen: Soft.  Not distended.  Nontender.  No guarding.  No rebound. Non-peritoneal.  Musculoskeletal: Good range of motion all joints.  No deformities.    No meningismus. TTP of left trapezius and left paracervical muscles. Pain elicited when laterally rotating head  Skin: No rashes seen.  Good turgor.  No abrasions.  No ecchymoses.  Neurologic: Motor intact.  Sensation intact.  Cerebellar intact.  Cranial nerves intact.  Mental Status:  Alert and oriented x 3.  Appropriate, conversant    Labs that have been ordered have been independently reviewed and interpreted by myself.        Labs Reviewed   COMPREHENSIVE METABOLIC PANEL - Abnormal; Notable for the following components:       Result Value    Potassium 3.2 (*)     All other components within normal limits   HIV 1 / 2 ANTIBODY    Narrative:     Release to patient->Immediate   HEPATITIS C ANTIBODY    Narrative:     Release to patient->Immediate   CBC W/ AUTO DIFFERENTIAL   B-TYPE NATRIURETIC PEPTIDE   TROPONIN I       Imaging Results              X-Ray Chest PA And Lateral (Final result)  Result time 12/13/22 13:20:52      Final result by Ar Estrella MD (12/13/22 13:20:52)                   Impression:      No detrimental change or radiographic acute intrathoracic process seen.      Electronically signed by: Ar Estrella MD  Date:    12/13/2022  Time:    13:20               Narrative:    EXAMINATION:  XR CHEST PA AND LATERAL    CLINICAL HISTORY:  Chest Pain;    TECHNIQUE:  PA and lateral views of the chest were performed.    COMPARISON:  Chest radiograph and CT thorax 06/27/2022; CT abdomen and pelvis 09/17/2022    FINDINGS:  No detrimental change.Bibasilar minimal platelike scarring versus atelectasis.  The lungs are otherwise well expanded without consolidation, sizeable pleural effusion  or pneumothorax seen.    The cardiac silhouette is normal in size. Pulmonary vasculature and hilar and mediastinal contours are within normal limits.    Osseous structures appear stable without acute process seen.  Partially imaged lumbar spinal leads noted.                                      Initial Impression/ Differential Dx:  Urgent evaluation of 47 y.o. female presenting with 2 day history of chest pain that began radiating to her back last night. Patient is afebrile, appears uncomfortable but not toxic appearing and hemodynamically stable.  Labs have resulted at time of initial assessment and are grossly unremarkable.  CMP with mild hypokalemia, replaced orally.  Troponin and BNP WNL.  CXR without acute cardiopulmonary process.  On exam patient is resistant to turn her head and is tender with palpation of the left paracervical spinal muscles/trapezius.  I suspect this is likely a musculoskeletal sprain, likely secondary to sleeping in a strange position.  Will treat with Toradol, Robaxin, Lidoderm and continue to reassess.  Considered but do not suspect AAA    Differential Diagnosis includes, but is not limited to:  ACS/MI, PE, aortic dissection, pneumothorax, cardiac tamponade, pericarditis/myocarditis, pneumonia, infection/abscess, lung mass, trauma/fracture, costochondritis/pleurisy, MSK pain/contusion, GERD, biliary disease, pancreatitis, anemia  Differential Diagnosis includes, but is not limited to:  Cauda equina syndrome, diskitis/osteomyelitis, epidural/paraspinal abscess, AAA, aortic dissection, post-op/hardware infection, trauma/vertebral fracture, spinal cord injury, disc herniation, spinal stenosis, sciatica, radiculopathy, neoplasm, lumbar muscle strain, muscle spasm, neuropathic pain, UTI/pyelonephritis, nephrolithiasis.      EKG (independently interpreted):  Normal sinus rhythm at a rate of 82 beats per minute, normal intervals, no STEMI or acute ischemic changes.      MDM:        ED Course as  of 12/13/22 1855 Tue Dec 13, 2022   1537 At bedside to reassess patient.  She reports significant improvement in her pain.  Patient's pain is likely musculoskeletal in nature given that it improved with Lidoderm, Robaxin and Toradol.  Will discharge patient home with supportive medications and PCP follow-up. Patient educated on signs and symptoms to monitor for and when to return to ED. Patient verbalized understanding agrees with treatment plan. All questions and concerns addressed.      [CU]      ED Course User Index  [CU] David Anthony NP                   Diagnostic Impression:    1. Strain of left trapezius muscle, initial encounter    2. Chest pain         ED Disposition Condition    Discharge Good            ED Prescriptions       Medication Sig Dispense Start Date End Date Auth. Provider    ibuprofen (ADVIL,MOTRIN) 600 MG tablet Take 1 tablet (600 mg total) by mouth every 6 (six) hours as needed for Pain. 20 tablet 12/13/2022 -- David Anthony NP    methocarbamoL (ROBAXIN) 750 MG Tab Take 2 tablets (1,500 mg total) by mouth 3 (three) times daily. for 5 days 30 tablet 12/13/2022 12/18/2022 David Anthony NP    LIDOcaine (LIDODERM) 5 % Place 1 patch onto the skin once daily. Remove & Discard patch within 12 hours or as directed by MD 15 patch 12/13/2022 -- David Anthony NP          Follow-up Information       Follow up With Specialties Details Why Contact Info    Hiwot Shah MD Family Medicine In 2 days  [address]  [address]  [facility] [address]  [phone]               David Anthony NP  12/13/22 0272

## 2022-12-13 NOTE — FIRST PROVIDER EVALUATION
Emergency Department TeleTriage Encounter Note      CHIEF COMPLAINT    Chief Complaint   Patient presents with    Back Pain     Upper part of back on left side    Chest Pain     2 days ago, LSCP    Shortness of Breath     With a walking    Fatigue     VITAL SIGNS   Initial Vitals [12/13/22 1201]   BP Pulse Resp Temp SpO2   117/68 99 20 98.7 °F (37.1 °C) 100 %      MAP       --          ALLERGIES    Review of patient's allergies indicates:  No Known Allergies    PROVIDER TRIAGE NOTE  This is a teletriage evaluation of a 47 y.o. female presenting to the ED with c/o tightness in chest and upper back that started last night.   No medications PTA.  Limited physical exam via telehealth: The patient is awake, alert, answering questions appropriately and is not in respiratory distress. Initial orders will be placed and care will be transferred to an alternate provider when patient is roomed for a full evaluation. Any additional orders and the final disposition will be determined by that provider.         ORDERS  Labs Reviewed   HIV 1 / 2 ANTIBODY   HEPATITIS C ANTIBODY       ED Orders (720h ago, onward)      Start Ordered     Status Ordering Provider    12/13/22 1240 12/13/22 1239  Vital signs  Every 15 min         Ordered YUKI CUENCA    12/13/22 1240 12/13/22 1239  Cardiac Monitoring - Adult  Continuous        Comments: Notify Physician If:    Ordered YUKI CUENCA    12/13/22 1240 12/13/22 1239  Pulse Oximetry Continuous  Continuous         Ordered YUKI CUENCA    12/13/22 1240 12/13/22 1239  Diet NPO  Diet effective now         Ordered YUKI CUENCA    12/13/22 1206 12/13/22 1206  EKG 12-lead (Chest Pain) Age >30  Once        Comments: If patient > 30 yrs.    Completed by RADHA KIM on 12/13/2022 at 12:16 PM SHAYNE LAGUNAS    12/13/22 1203 12/13/22 1205  HIV 1/2 Ag/Ab (4th Gen)  STAT         Ordered JANSHAYNE    12/13/22 1203 12/13/22 1205  Hepatitis C Antibody  STAT         Ordered JANSHAYNE     Unscheduled 12/13/22 1239  Saline lock IV  Once         Ordered YUKI CUENCA    Unscheduled 12/13/22 1239  EKG 12-lead  Once        Comments: Do not perform if previously done during this visit/ triage    Ordered YUKI CUENCA    Unscheduled 12/13/22 1239  CBC auto differential  STAT         Ordered YUKI CUENCA    Unscheduled 12/13/22 1239  Comprehensive metabolic panel  STAT         Ordered YUKI CUENCA    Unscheduled 12/13/22 1239  B-Type natriuretic peptide (BNP)  STAT         Ordered YUKI CUENCA    Unscheduled 12/13/22 1239  X-Ray Chest PA And Lateral  1 time imaging         Ordered YUKI CUENCA    Unscheduled 12/13/22 1239  Troponin I  STAT         Ordered YUKI CUENCA              Virtual Visit Note: The provider triage portion of this emergency department evaluation and documentation was performed via [employer], a HIPAA-compliant telemedicine application, in concert with a tele-presenter in the room. A face to face patient evaluation with one of my colleagues will occur once the patient is placed in an emergency department room.      DISCLAIMER: This note was prepared with [employer] voice recognition transcription software. Garbled syntax, mangled pronouns, and other bizarre constructions may be attributed to that software system.

## 2022-12-14 RX ORDER — LIDOCAINE 50 MG/G
1 PATCH TOPICAL DAILY
Qty: 15 PATCH | Refills: 0 | Status: CANCELLED | OUTPATIENT
Start: 2022-12-14

## 2022-12-15 ENCOUNTER — LAB VISIT (OUTPATIENT)
Dept: LAB | Facility: HOSPITAL | Age: 47
End: 2022-12-15
Attending: FAMILY MEDICINE
Payer: MEDICAID

## 2022-12-15 ENCOUNTER — OFFICE VISIT (OUTPATIENT)
Dept: PRIMARY CARE CLINIC | Facility: CLINIC | Age: 47
End: 2022-12-15
Payer: MEDICAID

## 2022-12-15 VITALS
HEART RATE: 88 BPM | SYSTOLIC BLOOD PRESSURE: 120 MMHG | BODY MASS INDEX: 44.18 KG/M2 | WEIGHT: 281.5 LBS | DIASTOLIC BLOOD PRESSURE: 70 MMHG | HEIGHT: 67 IN | OXYGEN SATURATION: 97 % | RESPIRATION RATE: 20 BRPM | TEMPERATURE: 98 F

## 2022-12-15 DIAGNOSIS — Z02.0 SCHOOL PHYSICAL EXAM: Primary | ICD-10-CM

## 2022-12-15 DIAGNOSIS — Z02.0 SCHOOL PHYSICAL EXAM: ICD-10-CM

## 2022-12-15 DIAGNOSIS — E78.01 FAMILIAL HYPERCHOLESTEREMIA: ICD-10-CM

## 2022-12-15 DIAGNOSIS — I10 HTN (HYPERTENSION), BENIGN: ICD-10-CM

## 2022-12-15 DIAGNOSIS — K29.70 GASTRITIS, PRESENCE OF BLEEDING UNSPECIFIED, UNSPECIFIED CHRONICITY, UNSPECIFIED GASTRITIS TYPE: ICD-10-CM

## 2022-12-15 PROCEDURE — 1159F MED LIST DOCD IN RCRD: CPT | Mod: CPTII,,, | Performed by: FAMILY MEDICINE

## 2022-12-15 PROCEDURE — 86787 VARICELLA-ZOSTER ANTIBODY: CPT | Performed by: FAMILY MEDICINE

## 2022-12-15 PROCEDURE — 3074F SYST BP LT 130 MM HG: CPT | Mod: CPTII,,, | Performed by: FAMILY MEDICINE

## 2022-12-15 PROCEDURE — 99999 PR PBB SHADOW E&M-EST. PATIENT-LVL IV: ICD-10-PCS | Mod: PBBFAC,,, | Performed by: FAMILY MEDICINE

## 2022-12-15 PROCEDURE — 3044F PR MOST RECENT HEMOGLOBIN A1C LEVEL <7.0%: ICD-10-PCS | Mod: CPTII,,, | Performed by: FAMILY MEDICINE

## 2022-12-15 PROCEDURE — 4010F ACE/ARB THERAPY RXD/TAKEN: CPT | Mod: CPTII,,, | Performed by: FAMILY MEDICINE

## 2022-12-15 PROCEDURE — 86762 RUBELLA ANTIBODY: CPT | Performed by: FAMILY MEDICINE

## 2022-12-15 PROCEDURE — 3078F PR MOST RECENT DIASTOLIC BLOOD PRESSURE < 80 MM HG: ICD-10-PCS | Mod: CPTII,,, | Performed by: FAMILY MEDICINE

## 2022-12-15 PROCEDURE — 3008F BODY MASS INDEX DOCD: CPT | Mod: CPTII,,, | Performed by: FAMILY MEDICINE

## 2022-12-15 PROCEDURE — 99999 PR PBB SHADOW E&M-EST. PATIENT-LVL IV: CPT | Mod: PBBFAC,,, | Performed by: FAMILY MEDICINE

## 2022-12-15 PROCEDURE — 86706 HEP B SURFACE ANTIBODY: CPT | Mod: 91 | Performed by: FAMILY MEDICINE

## 2022-12-15 PROCEDURE — 1160F RVW MEDS BY RX/DR IN RCRD: CPT | Mod: CPTII,,, | Performed by: FAMILY MEDICINE

## 2022-12-15 PROCEDURE — 99214 PR OFFICE/OUTPT VISIT, EST, LEVL IV, 30-39 MIN: ICD-10-PCS | Mod: S$PBB,,, | Performed by: FAMILY MEDICINE

## 2022-12-15 PROCEDURE — 86735 MUMPS ANTIBODY: CPT | Performed by: FAMILY MEDICINE

## 2022-12-15 PROCEDURE — 3008F PR BODY MASS INDEX (BMI) DOCUMENTED: ICD-10-PCS | Mod: CPTII,,, | Performed by: FAMILY MEDICINE

## 2022-12-15 PROCEDURE — 3044F HG A1C LEVEL LT 7.0%: CPT | Mod: CPTII,,, | Performed by: FAMILY MEDICINE

## 2022-12-15 PROCEDURE — 36415 COLL VENOUS BLD VENIPUNCTURE: CPT | Mod: PN | Performed by: FAMILY MEDICINE

## 2022-12-15 PROCEDURE — 1160F PR REVIEW ALL MEDS BY PRESCRIBER/CLIN PHARMACIST DOCUMENTED: ICD-10-PCS | Mod: CPTII,,, | Performed by: FAMILY MEDICINE

## 2022-12-15 PROCEDURE — 3078F DIAST BP <80 MM HG: CPT | Mod: CPTII,,, | Performed by: FAMILY MEDICINE

## 2022-12-15 PROCEDURE — 4010F PR ACE/ARB THEARPY RXD/TAKEN: ICD-10-PCS | Mod: CPTII,,, | Performed by: FAMILY MEDICINE

## 2022-12-15 PROCEDURE — 3074F PR MOST RECENT SYSTOLIC BLOOD PRESSURE < 130 MM HG: ICD-10-PCS | Mod: CPTII,,, | Performed by: FAMILY MEDICINE

## 2022-12-15 PROCEDURE — 99214 OFFICE O/P EST MOD 30 MIN: CPT | Mod: PBBFAC,PN | Performed by: FAMILY MEDICINE

## 2022-12-15 PROCEDURE — 86765 RUBEOLA ANTIBODY: CPT | Performed by: FAMILY MEDICINE

## 2022-12-15 PROCEDURE — 1159F PR MEDICATION LIST DOCUMENTED IN MEDICAL RECORD: ICD-10-PCS | Mod: CPTII,,, | Performed by: FAMILY MEDICINE

## 2022-12-15 PROCEDURE — 99214 OFFICE O/P EST MOD 30 MIN: CPT | Mod: S$PBB,,, | Performed by: FAMILY MEDICINE

## 2022-12-15 NOTE — PROGRESS NOTES
Subjective:      Patient ID: Rama Blair is a 47 y.o. female.    Chief Complaint: No chief complaint on file.    46 yo w HTN, HLD, anemia, GERD< spinal cord stimulator, asthma.   Here today for school exam phlebotomy at [employer].     Had Hep B vaccine x 1 last year    Tdap 5/7/2023    Was in ER 12/13 for chest wall strain. A little better after pain injection in ER>  L neck & upper chest to touch. Sore. Maybe slept wrong.     Denies any chest pain, shortness of breath,     Current Outpatient Medications   Medication Instructions    albuterol (PROVENTIL/VENTOLIN HFA) 90 mcg/actuation inhaler 1 puff, Inhalation, Every 6 hours PRN, Rescue    blood pressure monitor ([employer] EASE) XL arm size (OP) Other, As needed (PRN)    ezetimibe (ZETIA) 10 mg, Oral, Daily    hydroCHLOROthiazide (HYDRODIURIL) 25 MG tablet TAKE 1 TABLET(25 MG) BY MOUTH EVERY DAY    LIDOcaine (LIDODERM) 5 % 1 patch, Transdermal, Daily, Remove & Discard patch within 12 hours or as directed by MD    losartan (COZAAR) 100 mg, Oral, Daily    methocarbamoL (ROBAXIN) 1,500 mg, Oral, 3 times daily    oxyCODONE-acetaminophen (PERCOCET) 5-325 mg per tablet 1 tablet, Oral, Every 6 hours PRN    pantoprazole (PROTONIX) 40 MG tablet TAKE 1 TABLET(40 MG) BY MOUTH EVERY DAY    potassium citrate (UROCIT-K 15) 15 mEq TbSR 1 tablet, Oral, Daily    pregabalin (LYRICA) 100 mg, Oral, 2 times daily    rosuvastatin (CRESTOR) 40 mg, Oral, Nightly       Lab Results   Component Value Date    HGBA1C 5.5 05/24/2022    HGBA1C 5.4 05/06/2015    HGBA1C 5.3 08/01/2012     No results found for: MICALBCREAT  Lab Results   Component Value Date    LDLCALC 121.8 11/14/2022    LDLCALC 152.8 07/22/2022    CHOL 192 11/14/2022    HDL 56 11/14/2022    TRIG 71 11/14/2022       Lab Results   Component Value Date     12/13/2022    K 3.2 (L) 12/13/2022     12/13/2022    CO2 27 12/13/2022    GLU 91 12/13/2022    BUN 17 12/13/2022    CREATININE 1.1 12/13/2022    CALCIUM  9.4 12/13/2022    PROT 7.9 12/13/2022    ALBUMIN 3.7 12/13/2022    BILITOT 0.6 12/13/2022    ALKPHOS 80 12/13/2022    AST 22 12/13/2022    ALT 27 12/13/2022    ANIONGAP 12 12/13/2022    ESTGFRAFRICA >60 06/27/2022    EGFRNONAA >60 06/27/2022    WBC 7.77 12/13/2022    HGB 12.5 12/13/2022    HGB 12.1 09/17/2022    HCT 38.4 12/13/2022    MCV 89 12/13/2022     12/13/2022    TSH 2.942 05/09/2022    HEPCAB Negative 12/13/2022       Lab Results   Component Value Date    OKKCNMMB86 984 (H) 08/20/2021    FERRITIN 57 05/09/2022    IRON 47 05/09/2022    TRANSFERRIN 215 05/09/2022    TIBC 318 05/09/2022    FESATURATED 15 (L) 05/09/2022         Past Medical History:   Diagnosis Date    Anemia, unspecified     Constipation     GI Dr Reece 2014    COVID-19     Elevated platelet count 03/11/2021    Refer hematol 3/2021    Familial hypercholesteremia     per digital lipid    Gastritis 07/30/2021 2021    Gastroesophageal reflux disease without esophagitis 08/28/2018    She states she had EGD in past    Grief 09/19/2017    mom passed 2017    HTN (hypertension), benign 03/10/2015    Digital med 12/20    Intestinal erosis 09/17/2021    CS 2021    Iron deficiency anemia secondary to inadequate dietary iron intake 08/26/2021    Start OTC 8/21    Knee pain, left 2012    scope, Dr Reyes, injury at work, treadmill helps with stiffness    Low blood potassium 07/30/2021    avoid HCTZ    Mixed hyperlipidemia 08/29/2013    Morbid obesity 02/23/2015    bariatrics not covered by insurance    SOB (shortness of breath)     Spinal cord stimulator status 08/28/2018    In back for L knee pain, Dr Kessler surgery & pain mgmt, Lyrica or Hydrocodone at night    Thyroid disease      Past Surgical History:   Procedure Laterality Date    COLONOSCOPY N/A 9/17/2021    Procedure: COLONOSCOPY;  Surgeon: Keith Mckeon MD;  Location: [facility] (4TH FLR);  Service: Endoscopy;  Laterality: N/A;  prep ins. emailed / COVID screening [facility] 2nd floor 9/14/21-  "ERW    DILATION AND CURETTAGE OF UTERUS      ESOPHAGOGASTRODUODENOSCOPY N/A 7/27/2021    Procedure: ESOPHAGOGASTRODUODENOSCOPY (EGD);  Surgeon: Keith Mckeon MD;  Location: [address];  Service: Endoscopy;  Laterality: N/A;  COVID test 7/24 [facility]-    HYSTEROSCOPY WITH DILATION AND CURETTAGE OF UTERUS N/A 9/21/2022    Procedure: HYSTEROSCOPY, WITH DILATION AND CURETTAGE OF UTERUS;  Surgeon: Kim Juarez MD;  Location: [facility];  Service: OB/GYN;  Laterality: N/A;    KNEE ARTHROSCOPY      L, Dasa    KNEE SURGERY      SKIN GRAFT      left finger due to injury    SPINAL CORD STIMULATOR IMPLANT      to L knee, Dr Kessler    TUBAL LIGATION       Social History     Social History Narrative    Not on file     Family History   Problem Relation Age of Onset    Hypertension Mother     Diabetes Mother     COPD Mother     Breast cancer Neg Hx     Colon cancer Neg Hx     Ovarian cancer Neg Hx      Vitals:    12/15/22 1218   BP: 120/70   Pulse: 88   Resp: 20   Temp: 98.2 °F (36.8 °C)   TempSrc: Oral   SpO2: 97%   Weight: 127.7 kg (281 lb 8.4 oz)   Height: 5' 7" (1.702 m)   PainSc:   6   PainLoc: Shoulder     Objective:   Physical Exam  Cardiovascular:      Rate and Rhythm: Normal rate and regular rhythm.      Heart sounds: Normal heart sounds.   Pulmonary:      Effort: Pulmonary effort is normal. No respiratory distress.      Breath sounds: Normal breath sounds.   Psychiatric:         Behavior: Behavior normal.         Thought Content: Thought content normal.         Judgment: Judgment normal.     Assessment:     1. School physical exam    2. Familial hypercholesteremia    3. HTN (hypertension), benign    4. Gastritis, presence of bleeding unspecified, unspecified chronicity, unspecified gastritis type      Plan:     Orders Placed This Encounter    Varicella Zoster Antibody, IgG    Mumps, IgG Screen    Rubella Antibody, IgG    Rubeola Antibody IgG    Hepatitis B Surface Ab, Qualitative   Filled out form  I will notify " of results  Continue medications  PPD - will schedule to have this placed  FLu vaccine today  See me yearly    There are no Patient Instructions on file for this visit.

## 2022-12-16 ENCOUNTER — PATIENT MESSAGE (OUTPATIENT)
Dept: ADMINISTRATIVE | Facility: OTHER | Age: 47
End: 2022-12-16
Payer: MEDICAID

## 2022-12-16 ENCOUNTER — CLINICAL SUPPORT (OUTPATIENT)
Dept: PRIMARY CARE CLINIC | Facility: CLINIC | Age: 47
End: 2022-12-16
Payer: MEDICAID

## 2022-12-16 DIAGNOSIS — Z02.0 SCHOOL PHYSICAL EXAM: Primary | ICD-10-CM

## 2022-12-16 LAB
HBV SURFACE AB SER-ACNC: 56.12 MIU/ML
HBV SURFACE AB SER-ACNC: REACTIVE M[IU]/ML
RUBV IGG SER-ACNC: 38.6 IU/ML
RUBV IGG SER-IMP: REACTIVE

## 2022-12-16 PROCEDURE — 86580 TB INTRADERMAL TEST: CPT | Mod: PBBFAC,PN

## 2022-12-16 NOTE — PROGRESS NOTES
PPD Placement note  Rama Blair, 47 y.o. female is here today for placement of PPD test  Reason for PPD test: School Exam  Pt taken PPD test before: yes  Verified in allergy area and with patient that they are not allergic to the products PPD is made of (Phenol or Tween). Yes  Is patient taking any oral or IV steroid medication now or have they taken it in the last month? no  Has the patient ever received the BCG vaccine?: no  Has the patient been in recent contact with anyone known or suspected of having active TB disease?: no  O: Alert and oriented in NAD.  P:  PPD placed on 12/16/2022.  Patient advised to return for reading within 48-72 hours.

## 2022-12-19 ENCOUNTER — PATIENT MESSAGE (OUTPATIENT)
Dept: PRIMARY CARE CLINIC | Facility: CLINIC | Age: 47
End: 2022-12-19

## 2022-12-19 ENCOUNTER — CLINICAL SUPPORT (OUTPATIENT)
Dept: PRIMARY CARE CLINIC | Facility: CLINIC | Age: 47
End: 2022-12-19
Payer: MEDICAID

## 2022-12-19 DIAGNOSIS — Z02.0 SCHOOL PHYSICAL EXAM: Primary | ICD-10-CM

## 2022-12-19 LAB
MUMPS IGG INTERPRETATION: POSITIVE
MUMPS IGG SCREEN: 2.92 ISR (ref 0–0.9)
RUBEOLA IGG ANTIBODY: 2.38 ISR (ref 0–0.9)
RUBEOLA INTERPRETATION: POSITIVE
TB INDURATION - 48 HR READ: NORMAL
TB INDURATION - 72 HR READ: 0 MM
TB SKIN TEST - 48 HR READ: NORMAL
TB SKIN TEST - 72 HR READ: NEGATIVE
VARICELLA INTERPRETATION: POSITIVE
VARICELLA ZOSTER IGG: 2.3 ISR (ref 0–0.9)

## 2022-12-20 NOTE — PROGRESS NOTES
Good news!    You are immune to everything we tested. You can print out results & submit to your school

## 2023-01-11 ENCOUNTER — PATIENT MESSAGE (OUTPATIENT)
Dept: ADMINISTRATIVE | Facility: OTHER | Age: 48
End: 2023-01-11
Payer: MEDICAID

## 2023-01-25 ENCOUNTER — PATIENT MESSAGE (OUTPATIENT)
Dept: PRIMARY CARE CLINIC | Facility: CLINIC | Age: 48
End: 2023-01-25
Payer: MEDICAID

## 2023-05-05 ENCOUNTER — LAB VISIT (OUTPATIENT)
Dept: LAB | Facility: HOSPITAL | Age: 48
End: 2023-05-05
Attending: FAMILY MEDICINE
Payer: MEDICAID

## 2023-05-05 DIAGNOSIS — I10 HTN (HYPERTENSION), BENIGN: ICD-10-CM

## 2023-05-05 LAB
ANION GAP SERPL CALC-SCNC: 10 MMOL/L (ref 8–16)
BUN SERPL-MCNC: 21 MG/DL (ref 6–20)
CALCIUM SERPL-MCNC: 9.9 MG/DL (ref 8.7–10.5)
CHLORIDE SERPL-SCNC: 103 MMOL/L (ref 95–110)
CO2 SERPL-SCNC: 27 MMOL/L (ref 23–29)
CREAT SERPL-MCNC: 1.1 MG/DL (ref 0.5–1.4)
EST. GFR  (NO RACE VARIABLE): >60 ML/MIN/1.73 M^2
GLUCOSE SERPL-MCNC: 98 MG/DL (ref 70–110)
POTASSIUM SERPL-SCNC: 3.5 MMOL/L (ref 3.5–5.1)
SODIUM SERPL-SCNC: 140 MMOL/L (ref 136–145)

## 2023-05-05 PROCEDURE — 80048 BASIC METABOLIC PNL TOTAL CA: CPT | Performed by: FAMILY MEDICINE

## 2023-05-05 PROCEDURE — 36415 COLL VENOUS BLD VENIPUNCTURE: CPT | Mod: PN | Performed by: FAMILY MEDICINE

## 2023-05-31 ENCOUNTER — TELEPHONE (OUTPATIENT)
Dept: PRIMARY CARE CLINIC | Facility: CLINIC | Age: 48
End: 2023-05-31
Payer: MEDICAID

## 2023-05-31 RX ORDER — VALSARTAN 160 MG/1
160 TABLET ORAL DAILY
Qty: 90 TABLET | Refills: 0 | Status: SHIPPED | OUTPATIENT
Start: 2023-05-31 | End: 2023-07-06

## 2023-05-31 NOTE — TELEPHONE ENCOUNTER
LVM  Please let pt know that Losartan not covered by insurance. I sent Valsartan 160 daily instead   See me for PHYS after 6/30, no labs needed (lipids 11/2022)

## 2023-05-31 NOTE — TELEPHONE ENCOUNTER
LVM with pt requesting a return call to determine if she is still taking the HCTZ. Rx is still on med list.     Pharmacy requesting alternative therapy for HCTZ due to drug coverage.

## 2023-06-01 DIAGNOSIS — I10 HTN (HYPERTENSION), BENIGN: ICD-10-CM

## 2023-06-01 RX ORDER — SPIRONOLACTONE 25 MG/1
25 TABLET ORAL DAILY
Qty: 90 TABLET | Refills: 3 | Status: SHIPPED | OUTPATIENT
Start: 2023-06-01 | End: 2023-07-06

## 2023-07-05 ENCOUNTER — TELEPHONE (OUTPATIENT)
Dept: PRIMARY CARE CLINIC | Facility: CLINIC | Age: 48
End: 2023-07-05
Payer: MEDICAID

## 2023-07-06 ENCOUNTER — OFFICE VISIT (OUTPATIENT)
Dept: PRIMARY CARE CLINIC | Facility: CLINIC | Age: 48
End: 2023-07-06
Payer: MEDICAID

## 2023-07-06 VITALS
TEMPERATURE: 98 F | DIASTOLIC BLOOD PRESSURE: 82 MMHG | OXYGEN SATURATION: 99 % | HEART RATE: 95 BPM | SYSTOLIC BLOOD PRESSURE: 128 MMHG | BODY MASS INDEX: 44.43 KG/M2 | WEIGHT: 283.06 LBS | HEIGHT: 67 IN

## 2023-07-06 DIAGNOSIS — J02.9 SORE THROAT: Primary | ICD-10-CM

## 2023-07-06 DIAGNOSIS — R05.1 ACUTE COUGH: ICD-10-CM

## 2023-07-06 DIAGNOSIS — I10 HTN (HYPERTENSION), BENIGN: ICD-10-CM

## 2023-07-06 DIAGNOSIS — B30.9 ACUTE VIRAL CONJUNCTIVITIS OF BOTH EYES: ICD-10-CM

## 2023-07-06 PROCEDURE — 3074F PR MOST RECENT SYSTOLIC BLOOD PRESSURE < 130 MM HG: ICD-10-PCS | Mod: CPTII,,, | Performed by: FAMILY MEDICINE

## 2023-07-06 PROCEDURE — 3079F PR MOST RECENT DIASTOLIC BLOOD PRESSURE 80-89 MM HG: ICD-10-PCS | Mod: CPTII,,, | Performed by: FAMILY MEDICINE

## 2023-07-06 PROCEDURE — 1159F MED LIST DOCD IN RCRD: CPT | Mod: CPTII,,, | Performed by: FAMILY MEDICINE

## 2023-07-06 PROCEDURE — 99999 PR PBB SHADOW E&M-EST. PATIENT-LVL III: CPT | Mod: PBBFAC,,, | Performed by: FAMILY MEDICINE

## 2023-07-06 PROCEDURE — 99999 PR PBB SHADOW E&M-EST. PATIENT-LVL III: ICD-10-PCS | Mod: PBBFAC,,, | Performed by: FAMILY MEDICINE

## 2023-07-06 PROCEDURE — 99214 PR OFFICE/OUTPT VISIT, EST, LEVL IV, 30-39 MIN: ICD-10-PCS | Mod: S$PBB,,, | Performed by: FAMILY MEDICINE

## 2023-07-06 PROCEDURE — 4010F ACE/ARB THERAPY RXD/TAKEN: CPT | Mod: CPTII,,, | Performed by: FAMILY MEDICINE

## 2023-07-06 PROCEDURE — 4010F PR ACE/ARB THEARPY RXD/TAKEN: ICD-10-PCS | Mod: CPTII,,, | Performed by: FAMILY MEDICINE

## 2023-07-06 PROCEDURE — 3074F SYST BP LT 130 MM HG: CPT | Mod: CPTII,,, | Performed by: FAMILY MEDICINE

## 2023-07-06 PROCEDURE — 99213 OFFICE O/P EST LOW 20 MIN: CPT | Mod: PBBFAC,PN | Performed by: FAMILY MEDICINE

## 2023-07-06 PROCEDURE — 1159F PR MEDICATION LIST DOCUMENTED IN MEDICAL RECORD: ICD-10-PCS | Mod: CPTII,,, | Performed by: FAMILY MEDICINE

## 2023-07-06 PROCEDURE — 99214 OFFICE O/P EST MOD 30 MIN: CPT | Mod: S$PBB,,, | Performed by: FAMILY MEDICINE

## 2023-07-06 PROCEDURE — 1160F RVW MEDS BY RX/DR IN RCRD: CPT | Mod: CPTII,,, | Performed by: FAMILY MEDICINE

## 2023-07-06 PROCEDURE — 1160F PR REVIEW ALL MEDS BY PRESCRIBER/CLIN PHARMACIST DOCUMENTED: ICD-10-PCS | Mod: CPTII,,, | Performed by: FAMILY MEDICINE

## 2023-07-06 PROCEDURE — 3008F BODY MASS INDEX DOCD: CPT | Mod: CPTII,,, | Performed by: FAMILY MEDICINE

## 2023-07-06 PROCEDURE — 3008F PR BODY MASS INDEX (BMI) DOCUMENTED: ICD-10-PCS | Mod: CPTII,,, | Performed by: FAMILY MEDICINE

## 2023-07-06 PROCEDURE — 3079F DIAST BP 80-89 MM HG: CPT | Mod: CPTII,,, | Performed by: FAMILY MEDICINE

## 2023-07-06 RX ORDER — PROMETHAZINE HYDROCHLORIDE AND DEXTROMETHORPHAN HYDROBROMIDE 6.25; 15 MG/5ML; MG/5ML
5 SYRUP ORAL EVERY 4 HOURS PRN
Qty: 180 ML | Refills: 0 | Status: SHIPPED | OUTPATIENT
Start: 2023-07-06 | End: 2023-07-16

## 2023-07-06 RX ORDER — LOSARTAN POTASSIUM 100 MG/1
100 TABLET ORAL DAILY
Qty: 90 TABLET | Refills: 0
Start: 2023-07-06 | End: 2024-02-06

## 2023-07-06 NOTE — PROGRESS NOTES
Assessment:     1. Sore throat    2. Acute cough    3. Acute viral conjunctivitis of both eyes    4. HTN (hypertension), benign      Plan:     1. Sore throat  Assessment & Plan:  Tylenol 1000mg with breakfast, lunch & dinner      2. Acute cough  Assessment & Plan:  Plain Claritin 10mg when you go to bed.   Sudafed PE for post nasal drip - 2 in the morning & 2 at noon      3. Acute viral conjunctivitis of both eyes  Assessment & Plan:  Can use Visine red eye drop or Allergy drops to see which helps      4. HTN (hypertension), benign  Assessment & Plan:  Stable, continue losartan 100    Orders:  -     losartan (COZAAR) 100 MG tablet; Take 1 tablet (100 mg total) by mouth once daily.  Dispense: 90 tablet; Refill: 0    Other orders  -     promethazine-dextromethorphan (PROMETHAZINE-DM) 6.25-15 mg/5 mL Syrp; Take 5 mLs by mouth every 4 (four) hours as needed (cough). Do not drive  Dispense: 180 mL; Refill: 0        CHIEF COMPLAINT: sore throat    HPI: Rama Blair is a 47 y.o. female with is here today for sore throat, red eyes, cough 2d ago. No fever. No sick contacts. Son just started workin [employer]. Drinking hot tea & chloraseptic.     BP normal today w just Losartan 100 daily  Never started aldactone. We stopped HCTZ due to low potassium    Current Outpatient Medications   Medication Instructions    albuterol (PROVENTIL/VENTOLIN HFA) 90 mcg/actuation inhaler INHALE 1 PUFFS BY MOUTH INTO THE LUNGS EVERY 6 HOURS AS NEEDED FOR WHEEZING. RESCUE    blood pressure monitor ([facility] EASE) XL arm size (OP) Other, As needed (PRN)    ezetimibe (ZETIA) 10 mg, Oral, Daily    losartan (COZAAR) 100 mg, Oral, Daily    pantoprazole (PROTONIX) 40 MG tablet TAKE 1 TABLET(40 MG) BY MOUTH EVERY DAY    potassium citrate (UROCIT-K 15) 15 mEq TbSR TAKE 1 TABLET BY MOUTH EVERY DAY    pregabalin (LYRICA) 100 mg, Oral, 2 times daily    promethazine-dextromethorphan (PROMETHAZINE-DM) 6.25-15 mg/5 mL Syrp 5 mLs, Oral, Every  4 hours PRN, Do not drive    rosuvastatin (CRESTOR) 40 mg, Oral, Nightly       Lab Results   Component Value Date    HGBA1C 5.5 05/24/2022    HGBA1C 5.4 05/06/2015    HGBA1C 5.3 08/01/2012     No results found for: MICALBCREAT  Lab Results   Component Value Date    LDLCALC 121.8 11/14/2022    LDLCALC 152.8 07/22/2022    CHOL 192 11/14/2022    HDL 56 11/14/2022    TRIG 71 11/14/2022       Lab Results   Component Value Date     05/05/2023    K 3.5 05/05/2023     05/05/2023    CO2 27 05/05/2023    GLU 98 05/05/2023    BUN 21 (H) 05/05/2023    CREATININE 1.1 05/05/2023    CALCIUM 9.9 05/05/2023    PROT 7.9 12/13/2022    ALBUMIN 3.7 12/13/2022    BILITOT 0.6 12/13/2022    ALKPHOS 80 12/13/2022    AST 22 12/13/2022    ALT 27 12/13/2022    ANIONGAP 10 05/05/2023    ESTGFRAFRICA >60 06/27/2022    EGFRNONAA >60 06/27/2022    WBC 7.77 12/13/2022    HGB 12.5 12/13/2022    HGB 12.1 09/17/2022    HCT 38.4 12/13/2022    MCV 89 12/13/2022     12/13/2022    TSH 2.942 05/09/2022    HEPCAB Negative 12/13/2022       Lab Results   Component Value Date    IJULHGHM08 984 (H) 08/20/2021    FERRITIN 57 05/09/2022    IRON 47 05/09/2022    TRANSFERRIN 215 05/09/2022    TIBC 318 05/09/2022    FESATURATED 15 (L) 05/09/2022         Past Medical History:   Diagnosis Date    Anemia, unspecified     Constipation     GI Dr Reece 2014    COVID-19     Elevated platelet count 03/11/2021    Refer hematol 3/2021    Familial hypercholesteremia     per digital lipid    Gastritis 07/30/2021 2021    Gastroesophageal reflux disease without esophagitis 08/28/2018    She states she had EGD in past    Grief 09/19/2017    mom passed 2017    HTN (hypertension), benign 03/10/2015    Digital med 12/20    Intestinal erosis 09/17/2021    CS 2021    Iron deficiency anemia secondary to inadequate dietary iron intake 08/26/2021    Start OTC 8/21    Knee pain, left 2012    scope, Dr Reyes, injury at work, treadmill helps with stiffness    Low blood  "potassium 07/30/2021    avoid HCTZ    Mixed hyperlipidemia 08/29/2013    Morbid obesity 02/23/2015    bariatrics not covered by insurance    SOB (shortness of breath)     Spinal cord stimulator status 08/28/2018    In back for L knee pain, Dr Kessler surgery & pain mgmt, Lyrica or Hydrocodone at night    Thyroid disease      Past Surgical History:   Procedure Laterality Date    COLONOSCOPY N/A 9/17/2021    Procedure: COLONOSCOPY;  Surgeon: Keith Mckeon MD;  Location: [facility] ENDO (4TH FLR);  Service: Endoscopy;  Laterality: N/A;  prep ins. emailed / [place] [facility] [place]    DILATION AND CURETTAGE OF UTERUS      ESOPHAGOGASTRODUODENOSCOPY N/A 7/27/2021    Procedure: ESOPHAGOGASTRODUODENOSCOPY (EGD);  Surgeon: Keith Mckeon MD;  Location: [facility] ENDO (4TH FLR);  Service: Endoscopy;  Laterality: N/A;  COVID test 7/24 [facility]    HYSTEROSCOPY WITH DILATION AND CURETTAGE OF UTERUS N/A 9/21/2022    Procedure: HYSTEROSCOPY, WITH DILATION AND CURETTAGE OF UTERUS;  Surgeon: Kim Juarez MD;  Location: [facility];  Service: OB/GYN;  Laterality: N/A;    KNEE ARTHROSCOPY      L, Dasa    KNEE SURGERY      SKIN GRAFT      left finger due to injury    SPINAL CORD STIMULATOR IMPLANT      to L knee, Dr Kessler    TUBAL LIGATION       Vitals:    07/06/23 1126   BP: 128/82   Pulse: 95   Temp: 98.3 °F (36.8 °C)   TempSrc: Oral   SpO2: 99%   Weight: 128.4 kg (283 lb 1.1 oz)   Height: 5' 7" (1.702 m)   PainSc: 10-Worst pain ever   PainLoc: Throat     Objective:   Physical Exam  Vitals and nursing note reviewed.   Constitutional:       General: She is in acute distress.      Appearance: She is well-developed.   HENT:      Right Ear: Tympanic membrane and external ear normal.      Left Ear: Tympanic membrane and external ear normal.      Nose: Mucosal edema present. No rhinorrhea.      Right Sinus: No maxillary sinus tenderness or frontal sinus tenderness.      Left Sinus: No maxillary sinus tenderness or frontal sinus " tenderness.      Mouth/Throat:      Pharynx: No oropharyngeal exudate or posterior oropharyngeal erythema.   Eyes:      Pupils: Pupils are equal, round, and reactive to light.      Comments: Both conjunctiva red & draining   Neck:      Thyroid: No thyromegaly.   Cardiovascular:      Rate and Rhythm: Normal rate and regular rhythm.      Heart sounds: Normal heart sounds. No murmur heard.  Pulmonary:      Effort: Pulmonary effort is normal.      Breath sounds: Normal breath sounds. No wheezing or rales.   Musculoskeletal:      Cervical back: Normal range of motion and neck supple.   Lymphadenopathy:      Cervical: No cervical adenopathy.   Skin:     General: Skin is warm and dry.

## 2023-07-06 NOTE — ASSESSMENT & PLAN NOTE
Plain Claritin 10mg when you go to bed.   Sudafed PE for post nasal drip - 2 in the morning & 2 at noon

## 2023-07-12 DIAGNOSIS — I10 HTN (HYPERTENSION), BENIGN: ICD-10-CM

## 2023-07-12 NOTE — TELEPHONE ENCOUNTER
No care due was identified.  Health [facility] Embedded Care Due Messages. Reference number: [id number].   7/12/2023 5:52:27 AM CDT

## 2023-07-12 NOTE — TELEPHONE ENCOUNTER
Refill Routing Note   Medication(s) are not appropriate for processing by [facility] for the following reason(s):      The requested medication is not on the active medication list.    [facility] action(s):  Defer None identified   Medication Therapy Plan: The original prescription was discontinued on 6/1/2023 by Hiwot Shah MD.      Appointments  past 12m or future 3m with PCP    Date Provider   Last Visit   7/6/2023 Hiwot Shah MD   Next Visit   Visit date not found Hiwot Shah MD   ED visits in past 90 days: 0        Note composed:11:33 AM 07/12/2023

## 2023-07-13 RX ORDER — HYDROCHLOROTHIAZIDE 25 MG/1
TABLET ORAL
Qty: 90 TABLET | Refills: 0 | Status: SHIPPED | OUTPATIENT
Start: 2023-07-13 | End: 2023-10-10

## 2023-07-22 ENCOUNTER — PATIENT OUTREACH (OUTPATIENT)
Dept: EMERGENCY MEDICINE | Facility: OTHER | Age: 48
End: 2023-07-22
Payer: MEDICAID

## 2023-07-22 NOTE — PROGRESS NOTES
E-mailed F/U to [email or web address] due to unable to reach by phone. Reminded patient to call with any future needs/concerns and also resent [facility]IP documents.

## 2023-09-19 ENCOUNTER — HOSPITAL ENCOUNTER (EMERGENCY)
Facility: OTHER | Age: 48
Discharge: HOME | End: 2023-09-19
Attending: EMERGENCY MEDICINE
Payer: MEDICAID

## 2023-09-19 VITALS
BODY MASS INDEX: 39.24 KG/M2 | WEIGHT: 250 LBS | HEART RATE: 111 BPM | TEMPERATURE: 100 F | HEIGHT: 67 IN | OXYGEN SATURATION: 97 % | RESPIRATION RATE: 19 BRPM | SYSTOLIC BLOOD PRESSURE: 136 MMHG | DIASTOLIC BLOOD PRESSURE: 57 MMHG

## 2023-09-19 DIAGNOSIS — E86.0 MILD DEHYDRATION: ICD-10-CM

## 2023-09-19 DIAGNOSIS — B34.9 VIRAL SYNDROME: Primary | ICD-10-CM

## 2023-09-19 LAB
CTP QC/QA: YES
CTP QC/QA: YES
POC MOLECULAR INFLUENZA A AGN: NEGATIVE
POC MOLECULAR INFLUENZA B AGN: NEGATIVE
SARS-COV-2 RDRP RESP QL NAA+PROBE: NEGATIVE

## 2023-09-19 PROCEDURE — 25000003 PHARM REV CODE 250: Performed by: EMERGENCY MEDICINE

## 2023-09-19 PROCEDURE — 87635 SARS-COV-2 COVID-19 AMP PRB: CPT | Performed by: EMERGENCY MEDICINE

## 2023-09-19 PROCEDURE — 99282 EMERGENCY DEPT VISIT SF MDM: CPT

## 2023-09-19 RX ORDER — ACETAMINOPHEN 500 MG
1000 TABLET ORAL
Status: COMPLETED | OUTPATIENT
Start: 2023-09-19 | End: 2023-09-19

## 2023-09-19 RX ADMIN — ACETAMINOPHEN 1000 MG: 500 TABLET ORAL at 04:09

## 2023-09-19 NOTE — Clinical Note
"Rama Northjamil Blair was seen and treated in our emergency department on 9/19/2023.  She may return to work on 09/22/2023.       If you have any questions or concerns, please don't hesitate to call.      Naseem Ho II, MD"

## 2023-09-19 NOTE — ED PROVIDER NOTES
Source of History:  Patient    Chief complaint:  COVID-19 Concerns (Pt reporting chest tightness, subjective fever, body aches, and sore throat x 1 day. Unsure of sick contacts. Denies SOB at this time. )      HPI:  Rama Blair is a 47 y.o. female presenting with myalgias, malaise, fatigue, nonproductive cough and sore throat.  Symptoms started yesterday.  Reported a temperature at home of 100.2 yesterday, took Tylenol, no meds today.  Does not feel short of breath.  No exertional chest pain.  Decreased p.o. intake but no vomiting or diarrhea.  No difficulty urinating.  She is vaccinated for COVID.  Does work as a phlebotomist, with a lot of patient contacts    This is the extent to the patients complaints today here in the emergency department.    ROS: As per HPI    Review of patient's allergies indicates:  No Known Allergies    PMH:  As per HPI and below:  Past Medical History:   Diagnosis Date    Anemia, unspecified     Constipation     GI Dr Reece 2014    COVID-19     Elevated platelet count 03/11/2021    Refer hematol 3/2021    Familial hypercholesteremia     per digital lipid    Gastritis 07/30/2021 2021    Gastroesophageal reflux disease without esophagitis 08/28/2018    She states she had EGD in past    Grief 09/19/2017    mom passed 2017    HTN (hypertension), benign 03/10/2015    Digital med 12/20    Intestinal erosis 09/17/2021    CS 2021    Iron deficiency anemia secondary to inadequate dietary iron intake 08/26/2021    Start OTC 8/21    Knee pain, left 2012    scope, Dr Reyes, injury at work, treadmill helps with stiffness    Low blood potassium 07/30/2021    avoid HCTZ    Mixed hyperlipidemia 08/29/2013    Morbid obesity 02/23/2015    bariatrics not covered by insurance    SOB (shortness of breath)     Spinal cord stimulator status 08/28/2018    In back for L knee pain, Dr Kessler surgery & pain mgmt, Lyrica or Hydrocodone at night    Thyroid disease      Past Surgical History:   Procedure  "Laterality Date    COLONOSCOPY N/A 9/17/2021    Procedure: COLONOSCOPY;  Surgeon: Keith Mckeon MD;  Location: [facility] ENDO (4TH FLR);  Service: Endoscopy;  Laterality: N/A;  prep ins. emailed / COVID screening [facility] 9/14/21- ERW    DILATION AND CURETTAGE OF UTERUS      ESOPHAGOGASTRODUODENOSCOPY N/A 7/27/2021    Procedure: ESOPHAGOGASTRODUODENOSCOPY (EGD);  Surgeon: Keith Mckeon MD;  Location: [facility] ENDO (4TH FLR);  Service: Endoscopy;  Laterality: N/A;  COVID test 7/24 [facility]    HYSTEROSCOPY WITH DILATION AND CURETTAGE OF UTERUS N/A 9/21/2022    Procedure: HYSTEROSCOPY, WITH DILATION AND CURETTAGE OF UTERUS;  Surgeon: Kim Juarez MD;  Location: [facility] OR;  Service: OB/GYN;  Laterality: N/A;    KNEE ARTHROSCOPY      L, Dasa    KNEE SURGERY      SKIN GRAFT      left finger due to injury    SPINAL CORD STIMULATOR IMPLANT      to L knee, Dr Kessler    TUBAL LIGATION         Social History     Tobacco Use    Smoking status: Never    Smokeless tobacco: Never   Substance Use Topics    Alcohol use: No    Drug use: No       Physical Exam:    BP (!) 136/57 (BP Location: Left arm, Patient Position: Sitting)   Pulse (!) 111   Temp 99.7 °F (37.6 °C) (Oral)   Resp 19   Ht 5' 7" (1.702 m)   Wt 113.4 kg (250 lb)   LMP 04/27/2021   SpO2 97%   BMI 39.16 kg/m²   Nursing note and vital signs reviewed.  Appearance:  Mildly ill-appearing but in no acute distress.  Eyes: No conjunctival injection.  No pallor or icterus  ENT: Oropharynx clear.  Dry mucous membranes.  No erythema edema or exudate of tonsils.  Chest/ Respiratory: Clear to auscultation bilaterally.  Good air movement.  No wheezes.  No rhonchi. No rales. No accessory muscle use.  Cardiovascular:  Tachycardic rate, regular rhythm.  No murmurs. No gallops. No rubs.  Abdomen: Soft.  Not distended.  Nontender.  No guarding.  No rebound. Non-peritoneal.  Musculoskeletal: Good range of motion all joints.  No deformities.    Neck supple.  No meningismus.  No " cervical lymphadenopathy  Skin: No rashes seen.  Good turgor.  No abrasions.  No ecchymoses.  Neurologic: Motor intact.  Sensation intact.  Cerebellar intact.  Cranial nerves intact.  Mental Status:  Alert and oriented x 3.  Appropriate, conversant.    Labs that have been ordered have been independently reviewed and interpreted by myself.      MDM/ Differential Dx:    47 y.o. female with multiple nonspecific symptoms, consistent with viral syndrome.  Appears mildly dehydrated on exam, tachycardia.  Will administer Tylenol, have patient start rehydrating with p.o. fluids.  COVID and flu swabs were negative.  Suspect nonspecific viral syndrome.  Discussed symptomatic care and return precautions.  Discussed fever care and ongoing hydration at home.  Will provide a work note for the next couple days.                 Diagnostic Impression:    1. Viral syndrome    2. Mild dehydration         ED Disposition Condition    Discharge Stable            ED Prescriptions    None       Follow-up Information    None          Naseem Ho II, MD  09/19/23 2000

## 2023-09-20 ENCOUNTER — PATIENT MESSAGE (OUTPATIENT)
Dept: PRIMARY CARE CLINIC | Facility: CLINIC | Age: 48
End: 2023-09-20
Payer: MEDICAID

## 2023-09-20 NOTE — TELEPHONE ENCOUNTER
Pt was seen in ED on yesterday diagnosed with viral syndrome. She was told to take Tylenol and rest. Pt is complaining of fever sore throat and body aches. She want to know what to take for her symptoms. Please advise.

## 2023-09-22 ENCOUNTER — PATIENT MESSAGE (OUTPATIENT)
Dept: PRIMARY CARE CLINIC | Facility: CLINIC | Age: 48
End: 2023-09-22
Payer: MEDICAID

## 2023-09-22 NOTE — TELEPHONE ENCOUNTER
Pt states she purchased the OTC meds you suggested. She has had no relief.  She is requesting something to be prescribed for a cough. Please advise.

## 2023-09-25 RX ORDER — PROMETHAZINE HYDROCHLORIDE AND DEXTROMETHORPHAN HYDROBROMIDE 6.25; 15 MG/5ML; MG/5ML
5 SYRUP ORAL EVERY 4 HOURS PRN
Qty: 180 ML | Refills: 0 | Status: SHIPPED | OUTPATIENT
Start: 2023-09-25 | End: 2023-10-05

## 2023-10-09 DIAGNOSIS — I10 HTN (HYPERTENSION), BENIGN: ICD-10-CM

## 2023-10-09 NOTE — TELEPHONE ENCOUNTER
No care due was identified.  Health [facility] Embedded Care Due Messages. Reference number: [id number].   10/09/2023 5:39:46 AM CDT

## 2023-10-10 RX ORDER — HYDROCHLOROTHIAZIDE 25 MG/1
TABLET ORAL
Qty: 90 TABLET | Refills: 1 | Status: SHIPPED | OUTPATIENT
Start: 2023-10-10

## 2023-11-02 ENCOUNTER — PATIENT MESSAGE (OUTPATIENT)
Dept: PRIMARY CARE CLINIC | Facility: CLINIC | Age: 48
End: 2023-11-02
Payer: MEDICAID

## 2023-11-02 DIAGNOSIS — Z00.00 ROUTINE GENERAL MEDICAL EXAMINATION AT A HEALTH CARE FACILITY: Primary | ICD-10-CM

## 2023-11-03 ENCOUNTER — LAB VISIT (OUTPATIENT)
Dept: LAB | Facility: OTHER | Age: 48
End: 2023-11-03
Attending: FAMILY MEDICINE
Payer: MEDICAID

## 2023-11-03 DIAGNOSIS — Z00.00 ROUTINE GENERAL MEDICAL EXAMINATION AT A HEALTH CARE FACILITY: ICD-10-CM

## 2023-11-03 DIAGNOSIS — E78.00 PURE HYPERCHOLESTEROLEMIA: ICD-10-CM

## 2023-11-03 LAB
CHOLEST SERPL-MCNC: 181 MG/DL (ref 120–199)
CHOLEST/HDLC SERPL: 3.4 {RATIO} (ref 2–5)
ERYTHROCYTE [DISTWIDTH] IN BLOOD BY AUTOMATED COUNT: 13.9 % (ref 11.5–14.5)
HCT VFR BLD AUTO: 36.6 % (ref 37–48.5)
HDLC SERPL-MCNC: 53 MG/DL (ref 40–75)
HDLC SERPL: 29.3 % (ref 20–50)
HGB BLD-MCNC: 11.5 G/DL (ref 12–16)
LDLC SERPL CALC-MCNC: 113.4 MG/DL (ref 63–159)
MCH RBC QN AUTO: 28.3 PG (ref 27–31)
MCHC RBC AUTO-ENTMCNC: 31.4 G/DL (ref 32–36)
MCV RBC AUTO: 90 FL (ref 82–98)
NONHDLC SERPL-MCNC: 128 MG/DL
PLATELET # BLD AUTO: 323 K/UL (ref 150–450)
PMV BLD AUTO: 10.1 FL (ref 9.2–12.9)
RBC # BLD AUTO: 4.07 M/UL (ref 4–5.4)
TRIGL SERPL-MCNC: 73 MG/DL (ref 30–150)
WBC # BLD AUTO: 7.93 K/UL (ref 3.9–12.7)

## 2023-11-03 PROCEDURE — 85027 COMPLETE CBC AUTOMATED: CPT | Performed by: FAMILY MEDICINE

## 2023-11-03 PROCEDURE — 36415 COLL VENOUS BLD VENIPUNCTURE: CPT | Performed by: INTERNAL MEDICINE

## 2023-11-03 PROCEDURE — 80061 LIPID PANEL: CPT | Performed by: INTERNAL MEDICINE

## 2023-11-08 ENCOUNTER — OFFICE VISIT (OUTPATIENT)
Dept: PRIMARY CARE CLINIC | Facility: CLINIC | Age: 48
End: 2023-11-08
Payer: MEDICAID

## 2023-11-08 ENCOUNTER — TELEPHONE (OUTPATIENT)
Dept: PRIMARY CARE CLINIC | Facility: CLINIC | Age: 48
End: 2023-11-08
Payer: MEDICAID

## 2023-11-08 VITALS
BODY MASS INDEX: 43.91 KG/M2 | HEIGHT: 67 IN | OXYGEN SATURATION: 100 % | DIASTOLIC BLOOD PRESSURE: 80 MMHG | SYSTOLIC BLOOD PRESSURE: 118 MMHG | HEART RATE: 77 BPM | WEIGHT: 279.75 LBS | TEMPERATURE: 99 F

## 2023-11-08 DIAGNOSIS — F32.89 OTHER DEPRESSION: ICD-10-CM

## 2023-11-08 DIAGNOSIS — E66.01 CLASS 3 SEVERE OBESITY DUE TO EXCESS CALORIES WITH SERIOUS COMORBIDITY AND BODY MASS INDEX (BMI) OF 40.0 TO 44.9 IN ADULT: Primary | ICD-10-CM

## 2023-11-08 DIAGNOSIS — E78.2 MIXED HYPERLIPIDEMIA: ICD-10-CM

## 2023-11-08 DIAGNOSIS — Z00.00 ROUTINE GENERAL MEDICAL EXAMINATION AT A HEALTH CARE FACILITY: ICD-10-CM

## 2023-11-08 DIAGNOSIS — I10 HTN (HYPERTENSION), BENIGN: ICD-10-CM

## 2023-11-08 PROCEDURE — 99214 OFFICE O/P EST MOD 30 MIN: CPT | Mod: PBBFAC,PN

## 2023-11-08 PROCEDURE — 3008F PR BODY MASS INDEX (BMI) DOCUMENTED: ICD-10-PCS | Mod: CPTII,,,

## 2023-11-08 PROCEDURE — 3008F BODY MASS INDEX DOCD: CPT | Mod: CPTII,,,

## 2023-11-08 PROCEDURE — 4010F PR ACE/ARB THEARPY RXD/TAKEN: ICD-10-PCS | Mod: CPTII,,,

## 2023-11-08 PROCEDURE — 1159F MED LIST DOCD IN RCRD: CPT | Mod: CPTII,,,

## 2023-11-08 PROCEDURE — 3079F PR MOST RECENT DIASTOLIC BLOOD PRESSURE 80-89 MM HG: ICD-10-PCS | Mod: CPTII,,,

## 2023-11-08 PROCEDURE — 4010F ACE/ARB THERAPY RXD/TAKEN: CPT | Mod: CPTII,,,

## 2023-11-08 PROCEDURE — 3074F SYST BP LT 130 MM HG: CPT | Mod: CPTII,,,

## 2023-11-08 PROCEDURE — 99999 PR PBB SHADOW E&M-EST. PATIENT-LVL IV: CPT | Mod: PBBFAC,,,

## 2023-11-08 PROCEDURE — 3074F PR MOST RECENT SYSTOLIC BLOOD PRESSURE < 130 MM HG: ICD-10-PCS | Mod: CPTII,,,

## 2023-11-08 PROCEDURE — 3079F DIAST BP 80-89 MM HG: CPT | Mod: CPTII,,,

## 2023-11-08 PROCEDURE — 99215 PR OFFICE/OUTPT VISIT, EST, LEVL V, 40-54 MIN: ICD-10-PCS | Mod: S$PBB,,,

## 2023-11-08 PROCEDURE — 1159F PR MEDICATION LIST DOCUMENTED IN MEDICAL RECORD: ICD-10-PCS | Mod: CPTII,,,

## 2023-11-08 PROCEDURE — 99215 OFFICE O/P EST HI 40 MIN: CPT | Mod: S$PBB,,,

## 2023-11-08 PROCEDURE — 99999 PR PBB SHADOW E&M-EST. PATIENT-LVL IV: ICD-10-PCS | Mod: PBBFAC,,,

## 2023-11-08 RX ORDER — TIRZEPATIDE 2.5 MG/.5ML
2.5 INJECTION, SOLUTION SUBCUTANEOUS
Qty: 4 PEN | Refills: 0 | Status: SHIPPED | OUTPATIENT
Start: 2023-11-08 | End: 2023-11-09 | Stop reason: CLARIF

## 2023-11-08 RX ORDER — TRAZODONE HYDROCHLORIDE 50 MG/1
50 TABLET ORAL NIGHTLY PRN
Qty: 30 TABLET | Refills: 0 | Status: SHIPPED | OUTPATIENT
Start: 2023-11-08 | End: 2023-12-08

## 2023-11-08 NOTE — PROGRESS NOTES
[facility] Note    Chief Complaint      Chief Complaint   Patient presents with    Annual Exam     History of Present Illness      Rama Blair is a 47 y.o. female patient of Dr. Shah who presents today for annual exam.      Items discussed:  Immunizations, ASCVD risk, cancer prevention and screening, anxiety, depression, and recent blood work..    Patient received flu vaccine today.  Up-to-date with other vaccines except most recent COVID booster    Current ASCVD risk:  2.2% Values used for score:  Age: 47, Diabetes: no, sex: female, Smoker: no, Cholesterol: 181, HDL: 53, SBP: 118, treatment for HTN: yes, Race: .    Cancer screening:  Mammogram, PAP, colonoscopy are up to date.   Cancer prevention:  pt non-smoker.  Discussed increase in exercise and weight loss at great length.    Anxiety and Depression:  pt expressed recent episodes of anxiety and depression. Pt requested refill of trazodone to take as needed.    Blood work drawn on 11/03/2023.       Health Maintenance   Topic Date Due    Mammogram  07/26/2024    Lipid Panel  11/03/2028    TETANUS VACCINE  09/07/2031    Colorectal Cancer Screening  09/17/2031    Hepatitis C Screening  Completed       Past Medical History:   Diagnosis Date    Anemia, unspecified     Constipation     GI Dr Reece 2014    COVID-19     Elevated platelet count 03/11/2021    Refer hematol 3/2021    Familial hypercholesteremia     per digital lipid    Gastritis 07/30/2021 2021    Gastroesophageal reflux disease without esophagitis 08/28/2018    She states she had EGD in past    Grief 09/19/2017    mom passed 2017    HTN (hypertension), benign 03/10/2015    Digital med 12/20    Intestinal erosis 09/17/2021    CS 2021    Iron deficiency anemia secondary to inadequate dietary iron intake 08/26/2021    Start OTC 8/21    Knee pain, left 2012    scope, Dr Reyes, injury at work, treadmill helps with stiffness    Low blood potassium 07/30/2021    avoid  HCTZ    Mixed hyperlipidemia 08/29/2013    Morbid obesity 02/23/2015    bariatrics not covered by insurance    SOB (shortness of breath)     Spinal cord stimulator status 08/28/2018    In back for L knee pain, Dr Kessler surgery & pain mgmt, Lyrica or Hydrocodone at night    Thyroid disease        Past Surgical History:   Procedure Laterality Date    COLONOSCOPY N/A 9/17/2021    Procedure: COLONOSCOPY;  Surgeon: Keith Mckeon MD;  Location: [facility] (4TH FLR);  Service: Endoscopy;  Laterality: N/A;  prep ins. emailed / COVID screening [facility] 9/14/21- ERW    DILATION AND CURETTAGE OF UTERUS      ESOPHAGOGASTRODUODENOSCOPY N/A 7/27/2021    Procedure: ESOPHAGOGASTRODUODENOSCOPY (EGD);  Surgeon: Keith Mckeon MD;  Location: [facility] (4TH FLR);  Service: Endoscopy;  Laterality: N/A;  COVID test 7/24 [facility]-    HYSTEROSCOPY WITH DILATION AND CURETTAGE OF UTERUS N/A 9/21/2022    Procedure: HYSTEROSCOPY, WITH DILATION AND CURETTAGE OF UTERUS;  Surgeon: Kim Juarez MD;  Location: [facility];  Service: OB/GYN;  Laterality: N/A;    KNEE ARTHROSCOPY      L, Dasa    KNEE SURGERY      SKIN GRAFT      left finger due to injury    SPINAL CORD STIMULATOR IMPLANT      to L knee, Dr Kessler    TUBAL LIGATION         family history includes COPD in her mother; Diabetes in her mother; Hypertension in her mother.    Social History     Tobacco Use    Smoking status: Never    Smokeless tobacco: Never   Substance Use Topics    Alcohol use: No    Drug use: No       Review of Systems   Constitutional: Negative.    HENT: Negative.     Respiratory: Negative.     Cardiovascular: Negative.    Gastrointestinal: Negative.    Genitourinary: Negative.    Musculoskeletal:  Positive for joint pain.        Knee pain due to obesity     Neurological: Negative.         Outpatient Encounter Medications as of 11/8/2023   Medication Sig Dispense Refill    albuterol (PROVENTIL/VENTOLIN HFA) 90 mcg/actuation inhaler INHALE 1 PUFFS BY MOUTH INTO THE  "LUNGS EVERY 6 HOURS AS NEEDED FOR WHEEZING. RESCUE 54 g 2    blood pressure monitor (IHEAL EASE) XL arm size (OP) by Other route as needed for High Blood Pressure. 1 each 0    ezetimibe (ZETIA) 10 mg tablet Take 1 tablet (10 mg total) by mouth once daily. 90 tablet 3    hydroCHLOROthiazide (HYDRODIURIL) 25 MG tablet TAKE 1 TABLET(25 MG) BY MOUTH EVERY DAY 90 tablet 1    losartan (COZAAR) 100 MG tablet Take 1 tablet (100 mg total) by mouth once daily. 90 tablet 0    pantoprazole (PROTONIX) 40 MG tablet TAKE 1 TABLET(40 MG) BY MOUTH EVERY DAY 90 tablet 0    potassium citrate (UROCIT-K 15) 15 mEq TbSR TAKE 1 TABLET BY MOUTH EVERY DAY 90 tablet 3    pregabalin (LYRICA) 100 MG capsule Take 100 mg by mouth 2 (two) times daily.      rosuvastatin (CRESTOR) 40 MG Tab Take 1 tablet (40 mg total) by mouth every evening. 90 tablet 3    tirzepatide (MOUNJARO) 2.5 mg/0.5 mL PnIj Inject 2.5 mg into the skin every 7 days. 4 Pen 0    traZODone (DESYREL) 50 MG tablet Take 1 tablet (50 mg total) by mouth nightly as needed for Insomnia or Depression. 30 tablet 0     No facility-administered encounter medications on file as of 11/8/2023.        Review of patient's allergies indicates:  No Known Allergies    Physical Exam      Vital Signs  Temp: 98.5 °F (36.9 °C)  Pulse: 77  SpO2: 100 %  BP: 118/80  BP Location: Right arm  Patient Position: Sitting  Pain Score:   8  Pain Loc: Knee  Height and Weight  Height: 5' 7" (170.2 cm)  Weight: 126.9 kg (279 lb 12.2 oz)  BSA (Calculated - sq m): 2.45 sq meters  BMI (Calculated): 43.8  Weight in (lb) to have BMI = 25: 159.3    Physical Exam  Vitals reviewed.   Constitutional:       Appearance: Normal appearance.   HENT:      Head: Normocephalic and atraumatic.   Cardiovascular:      Rate and Rhythm: Normal rate and regular rhythm.      Pulses: Normal pulses.           Radial pulses are 2+ on the right side and 2+ on the left side.        Dorsalis pedis pulses are 2+ on the right side and 2+ on the " left side.        Posterior tibial pulses are 2+ on the right side and 2+ on the left side.      Heart sounds: Normal heart sounds.   Pulmonary:      Effort: Pulmonary effort is normal.      Breath sounds: Normal breath sounds.   Musculoskeletal:      Right lower leg: No edema.      Left lower leg: No edema.   Skin:     General: Skin is warm and dry.      Capillary Refill: Capillary refill takes less than 2 seconds.   Neurological:      General: No focal deficit present.      Mental Status: She is alert and oriented to person, place, and time.   Psychiatric:         Mood and Affect: Mood normal.         Behavior: Behavior normal.        Laboratory:  CBC:  Lab Results   Component Value Date    WBC 7.93 11/03/2023    RBC 4.07 11/03/2023    HGB 11.5 (L) 11/03/2023    HCT 36.6 (L) 11/03/2023     11/03/2023    MCV 90 11/03/2023    MCH 28.3 11/03/2023    MCHC 31.4 (L) 11/03/2023    MCHC 32.6 12/13/2022    MCHC 31.8 (L) 09/17/2022     CMP:  Lab Results   Component Value Date    GLU 98 05/05/2023    CALCIUM 9.9 05/05/2023    ALBUMIN 3.7 12/13/2022    PROT 7.9 12/13/2022     05/05/2023    K 3.5 05/05/2023    CO2 27 05/05/2023     05/05/2023    BUN 21 (H) 05/05/2023    ALKPHOS 80 12/13/2022    ALT 27 12/13/2022    AST 22 12/13/2022    BILITOT 0.6 12/13/2022    BILITOT 0.5 09/17/2022    BILITOT 0.5 06/27/2022     URINALYSIS:  Lab Results   Component Value Date    COLORU Yellow 09/17/2022    CLARITYU Cloudy 06/05/2020    SPECGRAV 1.025 09/17/2022    PHUR 6.0 09/17/2022    PROTEINUA Negative 09/17/2022    BACTERIA Rare 09/17/2022    NITRITE Negative 09/17/2022    LEUKOCYTESUR Negative 09/17/2022    UROBILINOGEN Negative 09/17/2022    HYALINECASTS 0 06/05/2020      LIPIDS:  Lab Results   Component Value Date    TSH 2.942 05/09/2022    TSH 2.352 02/14/2020    TSH 3.493 01/30/2019    HDL 53 11/03/2023    HDL 56 11/14/2022    HDL 50 07/22/2022    CHOL 181 11/03/2023    CHOL 192 11/14/2022    CHOL 221 (H)  07/22/2022    TRIG 73 11/03/2023    TRIG 71 11/14/2022    TRIG 91 07/22/2022    LDLCALC 113.4 11/03/2023    LDLCALC 121.8 11/14/2022    LDLCALC 152.8 07/22/2022    CHOLHDL 29.3 11/03/2023    CHOLHDL 29.2 11/14/2022    CHOLHDL 22.6 07/22/2022    NONHDLCHOL 128 11/03/2023    NONHDLCHOL 136 11/14/2022    NONHDLCHOL 171 07/22/2022    TOTALCHOLEST 3.4 11/03/2023    TOTALCHOLEST 3.4 11/14/2022    TOTALCHOLEST 4.4 07/22/2022     TSH:  Lab Results   Component Value Date    TSH 2.942 05/09/2022    TSH 2.352 02/14/2020    TSH 3.493 01/30/2019     A1C:  Lab Results   Component Value Date    HGBA1C 5.5 05/24/2022    HGBA1C 5.4 05/06/2015    HGBA1C 5.3 08/01/2012         Assessment/Plan     Rama Blair is a 47 y.o.female with:      1. Class 3 severe obesity due to excess calories with serious comorbidity and body mass index (BMI) of 40.0 to 44.9 in adult  -     tirzepatide (MOUNJARO) 2.5 mg/0.5 mL PnIj; Inject 2.5 mg into the skin every 7 days.  Dispense: 4 Pen; Refill: 0  -     Ambulatory referral/consult to Bariatric Surgery; Future; Expected date: 11/15/2023    2. Mixed hyperlipidemia  -     tirzepatide (MOUNJARO) 2.5 mg/0.5 mL PnIj; Inject 2.5 mg into the skin every 7 days.  Dispense: 4 Pen; Refill: 0    3. HTN (hypertension), benign  -     tirzepatide (MOUNJARO) 2.5 mg/0.5 mL PnIj; Inject 2.5 mg into the skin every 7 days.  Dispense: 4 Pen; Refill: 0    4. Routine general medical examination at a health care facility    5. Other depression  -     traZODone (DESYREL) 50 MG tablet; Take 1 tablet (50 mg total) by mouth nightly as needed for Insomnia or Depression.  Dispense: 30 tablet; Refill: 0         I spent 65 minutes on the day of this encounter for preparing for, evaluating, treating, and managing this patient.      -Continue current medications and maintain follow up with specialists.  Return to clinic in No follow-ups on file.       Mally Sorto NP  [facility] -[facility]    Portions  of this note may have been generated using voice recognition software.  Please excuse any spelling/grammatical errors. Occasional wrong-word or sound-a-like substitutions may have also occurred due to the inherent limitations of voice recognition software. Please read the chart carefully and recognize, using context, where substitutions have occurred.

## 2023-11-08 NOTE — PATIENT INSTRUCTIONS
I highly recommend my Lifestyle Medicine collegue. Her approach is very mindful & finding balance in life.     Dr Manolo Boykin, Pulmonologist & Lifestyle Medicine Board Certified     [email or web address]

## 2023-11-09 ENCOUNTER — PATIENT MESSAGE (OUTPATIENT)
Dept: PRIMARY CARE CLINIC | Facility: CLINIC | Age: 48
End: 2023-11-09
Payer: MEDICAID

## 2023-11-20 ENCOUNTER — PATIENT MESSAGE (OUTPATIENT)
Dept: PRIMARY CARE CLINIC | Facility: CLINIC | Age: 48
End: 2023-11-20
Payer: MEDICAID

## 2023-11-22 ENCOUNTER — HOSPITAL ENCOUNTER (EMERGENCY)
Facility: OTHER | Age: 48
Discharge: HOME | End: 2023-11-22
Attending: EMERGENCY MEDICINE
Payer: MEDICAID

## 2023-11-22 VITALS
TEMPERATURE: 98 F | RESPIRATION RATE: 18 BRPM | OXYGEN SATURATION: 98 % | WEIGHT: 273 LBS | BODY MASS INDEX: 42.85 KG/M2 | DIASTOLIC BLOOD PRESSURE: 69 MMHG | SYSTOLIC BLOOD PRESSURE: 148 MMHG | HEIGHT: 67 IN | HEART RATE: 73 BPM

## 2023-11-22 DIAGNOSIS — R07.89 ATYPICAL CHEST PAIN: ICD-10-CM

## 2023-11-22 DIAGNOSIS — B34.9 VIRAL SYNDROME: Primary | ICD-10-CM

## 2023-11-22 DIAGNOSIS — R07.9 CHEST PAIN: ICD-10-CM

## 2023-11-22 LAB
ALBUMIN SERPL BCP-MCNC: 3.8 G/DL (ref 3.5–5.2)
ALP SERPL-CCNC: 58 U/L (ref 55–135)
ALT SERPL W/O P-5'-P-CCNC: 14 U/L (ref 10–44)
ANION GAP SERPL CALC-SCNC: 15 MMOL/L (ref 8–16)
AST SERPL-CCNC: 26 U/L (ref 10–40)
BASOPHILS # BLD AUTO: 0.03 K/UL (ref 0–0.2)
BASOPHILS NFR BLD: 0.6 % (ref 0–1.9)
BILIRUB SERPL-MCNC: 0.6 MG/DL (ref 0.1–1)
BNP SERPL-MCNC: <10 PG/ML (ref 0–99)
BUN SERPL-MCNC: 13 MG/DL (ref 6–20)
CALCIUM SERPL-MCNC: 9.1 MG/DL (ref 8.7–10.5)
CHLORIDE SERPL-SCNC: 100 MMOL/L (ref 95–110)
CO2 SERPL-SCNC: 23 MMOL/L (ref 23–29)
CREAT SERPL-MCNC: 1 MG/DL (ref 0.5–1.4)
CTP QC/QA: YES
CTP QC/QA: YES
DIFFERENTIAL METHOD: NORMAL
EOSINOPHIL # BLD AUTO: 0 K/UL (ref 0–0.5)
EOSINOPHIL NFR BLD: 0.8 % (ref 0–8)
ERYTHROCYTE [DISTWIDTH] IN BLOOD BY AUTOMATED COUNT: 13.5 % (ref 11.5–14.5)
EST. GFR  (NO RACE VARIABLE): >60 ML/MIN/1.73 M^2
GLUCOSE SERPL-MCNC: 93 MG/DL (ref 70–110)
HCT VFR BLD AUTO: 38.8 % (ref 37–48.5)
HCV AB SERPL QL IA: NEGATIVE
HGB BLD-MCNC: 12.4 G/DL (ref 12–16)
HIV 1+2 AB+HIV1 P24 AG SERPL QL IA: NEGATIVE
IMM GRANULOCYTES # BLD AUTO: 0.01 K/UL (ref 0–0.04)
IMM GRANULOCYTES NFR BLD AUTO: 0.2 % (ref 0–0.5)
LYMPHOCYTES # BLD AUTO: 2.4 K/UL (ref 1–4.8)
LYMPHOCYTES NFR BLD: 46.2 % (ref 18–48)
MCH RBC QN AUTO: 28.2 PG (ref 27–31)
MCHC RBC AUTO-ENTMCNC: 32 G/DL (ref 32–36)
MCV RBC AUTO: 88 FL (ref 82–98)
MONOCYTES # BLD AUTO: 0.6 K/UL (ref 0.3–1)
MONOCYTES NFR BLD: 12.1 % (ref 4–15)
NEUTROPHILS # BLD AUTO: 2.1 K/UL (ref 1.8–7.7)
NEUTROPHILS NFR BLD: 40.1 % (ref 38–73)
NRBC BLD-RTO: 0 /100 WBC
PLATELET # BLD AUTO: 294 K/UL (ref 150–450)
PMV BLD AUTO: 9.9 FL (ref 9.2–12.9)
POC MOLECULAR INFLUENZA A AGN: NEGATIVE
POC MOLECULAR INFLUENZA B AGN: NEGATIVE
POTASSIUM SERPL-SCNC: 3.3 MMOL/L (ref 3.5–5.1)
PROT SERPL-MCNC: 8.1 G/DL (ref 6–8.4)
RBC # BLD AUTO: 4.39 M/UL (ref 4–5.4)
SARS-COV-2 RDRP RESP QL NAA+PROBE: NEGATIVE
SODIUM SERPL-SCNC: 138 MMOL/L (ref 136–145)
TROPONIN I SERPL DL<=0.01 NG/ML-MCNC: 0.02 NG/ML (ref 0–0.03)
WBC # BLD AUTO: 5.28 K/UL (ref 3.9–12.7)

## 2023-11-22 PROCEDURE — 93005 ELECTROCARDIOGRAM TRACING: CPT

## 2023-11-22 PROCEDURE — 63600175 PHARM REV CODE 636 W HCPCS: Performed by: EMERGENCY MEDICINE

## 2023-11-22 PROCEDURE — 80053 COMPREHEN METABOLIC PANEL: CPT | Performed by: EMERGENCY MEDICINE

## 2023-11-22 PROCEDURE — 84484 ASSAY OF TROPONIN QUANT: CPT | Performed by: EMERGENCY MEDICINE

## 2023-11-22 PROCEDURE — 85025 COMPLETE CBC W/AUTO DIFF WBC: CPT | Performed by: EMERGENCY MEDICINE

## 2023-11-22 PROCEDURE — 83880 ASSAY OF NATRIURETIC PEPTIDE: CPT | Performed by: EMERGENCY MEDICINE

## 2023-11-22 PROCEDURE — 93010 EKG 12-LEAD: ICD-10-PCS | Mod: ,,, | Performed by: INTERNAL MEDICINE

## 2023-11-22 PROCEDURE — 87389 HIV-1 AG W/HIV-1&-2 AB AG IA: CPT | Performed by: EMERGENCY MEDICINE

## 2023-11-22 PROCEDURE — 87635 SARS-COV-2 COVID-19 AMP PRB: CPT | Performed by: EMERGENCY MEDICINE

## 2023-11-22 PROCEDURE — 25000003 PHARM REV CODE 250: Performed by: EMERGENCY MEDICINE

## 2023-11-22 PROCEDURE — 93010 ELECTROCARDIOGRAM REPORT: CPT | Mod: ,,, | Performed by: INTERNAL MEDICINE

## 2023-11-22 PROCEDURE — 99285 EMERGENCY DEPT VISIT HI MDM: CPT | Mod: 25

## 2023-11-22 PROCEDURE — 86803 HEPATITIS C AB TEST: CPT | Performed by: EMERGENCY MEDICINE

## 2023-11-22 PROCEDURE — 96361 HYDRATE IV INFUSION ADD-ON: CPT

## 2023-11-22 PROCEDURE — 96374 THER/PROPH/DIAG INJ IV PUSH: CPT

## 2023-11-22 RX ORDER — KETOROLAC TROMETHAMINE 30 MG/ML
10 INJECTION, SOLUTION INTRAMUSCULAR; INTRAVENOUS
Status: COMPLETED | OUTPATIENT
Start: 2023-11-22 | End: 2023-11-22

## 2023-11-22 RX ORDER — ONDANSETRON 4 MG/1
4 TABLET, ORALLY DISINTEGRATING ORAL EVERY 8 HOURS PRN
Qty: 30 TABLET | Refills: 0 | Status: SHIPPED | OUTPATIENT
Start: 2023-11-22

## 2023-11-22 RX ADMIN — SODIUM CHLORIDE 1000 ML: 9 INJECTION, SOLUTION INTRAVENOUS at 05:11

## 2023-11-22 RX ADMIN — KETOROLAC TROMETHAMINE 10 MG: 30 INJECTION, SOLUTION INTRAMUSCULAR at 05:11

## 2023-11-22 NOTE — ED PROVIDER NOTES
Encounter Date: 11/22/2023    SCRIBE #1 NOTE: I, Quinton Tomas, am scribing for, and in the presence of,  Alisia Coker MD.       History     Chief Complaint   Patient presents with    Chest Pain     Chest tightness, body aches, fever and diarrhea x 2 days     Time seen by provider: 4:20 PM    Rama Blair is a 47 y.o. female, with a PMHx of HTN, HLD, and GERD, who presents to the ED with mid-sternal chest pain onset yesterday. The patient reports accompanying abdominal cramping, diarrhea, fevers recorded at 100.3 F, generalized body pain, palpitations, and hypertension with at-home blood pressure measured yesterday at 181 systolic. She notes having a sore throat last week but denies any current throat pain or rhinorrhea. Patient reports analgesic use of Tylenol with last dose at 1200. She denies taking any of her medications today due to her abdominal pain. This is the extent of the patient's complaints today in the Emergency Department.          Review of patient's allergies indicates:  No Known Allergies  Past Medical History:   Diagnosis Date    Anemia, unspecified     Constipation     GI Dr Reece 2014    COVID-19     Elevated platelet count 03/11/2021    Refer hematol 3/2021    Familial hypercholesteremia     per digital lipid    Gastritis 07/30/2021 2021    Gastroesophageal reflux disease without esophagitis 08/28/2018    She states she had EGD in past    Grief 09/19/2017    mom passed 2017    HTN (hypertension), benign 03/10/2015    Digital med 12/20    Intestinal erosis 09/17/2021    CS 2021    Iron deficiency anemia secondary to inadequate dietary iron intake 08/26/2021    Start OTC 8/21    Knee pain, left 2012    scope, Dr Reyes, injury at work, treadmill helps with stiffness    Low blood potassium 07/30/2021    avoid HCTZ    Mixed hyperlipidemia 08/29/2013    Morbid obesity 02/23/2015    bariatrics not covered by insurance    SOB (shortness of breath)     Spinal cord stimulator status 08/28/2018     In back for L knee pain, Dr Kessler surgery & pain mgmt, Lyrica or Hydrocodone at night    Thyroid disease      Past Surgical History:   Procedure Laterality Date    COLONOSCOPY N/A 9/17/2021    Procedure: COLONOSCOPY;  Surgeon: Keith Mckeon MD;  Location: [facility] (4TH FLR);  Service: Endoscopy;  Laterality: N/A;  prep ins. emailed / [place] [facility] [place] ER[place]    DILATION AND CURETTAGE OF UTERUS      ESOPHAGOGASTRODUODENOSCOPY N/A 7/27/2021    Procedure: ESOPHAGOGASTRODUODENOSCOPY (EGD);  Surgeon: Keith Mckeon MD;  Location: [facility] (4TH FLR);  Service: Endoscopy;  Laterality: N/A;  COVID test 7/24 [facility]-    HYSTEROSCOPY WITH DILATION AND CURETTAGE OF UTERUS N/A 9/21/2022    Procedure: HYSTEROSCOPY, WITH DILATION AND CURETTAGE OF UTERUS;  Surgeon: Kim Juarez MD;  Location: [facility];  Service: OB/GYN;  Laterality: N/A;    KNEE ARTHROSCOPY      L, Dasa    KNEE SURGERY      SKIN GRAFT      left finger due to injury    SPINAL CORD STIMULATOR IMPLANT      to L knee, Dr Kessler    TUBAL LIGATION       Family History   Problem Relation Age of Onset    Hypertension Mother     Diabetes Mother     COPD Mother     Breast cancer Neg Hx     Colon cancer Neg Hx     Ovarian cancer Neg Hx      Social History     Tobacco Use    Smoking status: Never    Smokeless tobacco: Never   Substance Use Topics    Alcohol use: No    Drug use: No     Review of Systems   Constitutional:  Positive for fever.   HENT:  Negative for sore throat.    Respiratory:  Negative for shortness of breath.    Cardiovascular:  Positive for chest pain and palpitations.   Gastrointestinal:  Positive for abdominal pain. Negative for nausea.   Genitourinary:  Negative for dysuria.   Musculoskeletal:  Positive for myalgias. Negative for back pain.   Skin:  Negative for color change, rash and wound.   Neurological:  Negative for weakness.   Hematological:  Does not bruise/bleed easily.   All other systems reviewed and are  negative.      Physical Exam     Initial Vitals [11/22/23 1603]   BP Pulse Resp Temp SpO2   (!) 191/96 100 20 99.1 °F (37.3 °C) 95 %      MAP       --         Physical Exam    Nursing note and vitals reviewed.  Constitutional: She appears well-developed and well-nourished. No distress.   Ill appearing, nontoxic. Morbidly obese.   HENT:   Head: Normocephalic and atraumatic.   Right Ear: External ear normal.   Left Ear: External ear normal.   Eyes: Conjunctivae and EOM are normal. Pupils are equal, round, and reactive to light. Right eye exhibits no discharge. Left eye exhibits no discharge. No scleral icterus.   No photophobia.   Neck: Neck supple.   Normal range of motion.  Cardiovascular:  Regular rhythm and normal heart sounds.   Tachycardia present.   Exam reveals no gallop and no friction rub.       No murmur heard.  Pulmonary/Chest: Breath sounds normal. No stridor. No respiratory distress. She has no wheezes. She has no rhonchi. She has no rales.   Abdominal: Abdomen is soft. Bowel sounds are normal. There is no abdominal tenderness.   Musculoskeletal:         General: No edema. Normal range of motion.      Cervical back: Normal range of motion and neck supple.     Neurological: She is alert and oriented to person, place, and time. She has normal strength. No cranial nerve deficit or sensory deficit. GCS score is 15. GCS eye subscore is 4. GCS verbal subscore is 5. GCS motor subscore is 6.   Skin: Skin is warm and dry. Capillary refill takes less than 2 seconds. No rash noted.   Psychiatric: She has a normal mood and affect. Her behavior is normal. Judgment and thought content normal.         ED Course   Procedures  Labs Reviewed   COMPREHENSIVE METABOLIC PANEL - Abnormal; Notable for the following components:       Result Value    Potassium 3.3 (*)     All other components within normal limits    Narrative:     Release to patient->Immediate   HIV 1 / 2 ANTIBODY    Narrative:     Release to patient->Immediate    HEPATITIS C ANTIBODY    Narrative:     Release to patient->Immediate   CBC W/ AUTO DIFFERENTIAL    Narrative:     Release to patient->Immediate   TROPONIN I    Narrative:     Release to patient->Immediate   B-TYPE NATRIURETIC PEPTIDE    Narrative:     Release to patient->Immediate   POCT INFLUENZA A/B MOLECULAR   SARS-COV-2 RDRP GENE     EKG Readings: (Independently Interpreted)   Sinus tachycardia with rate of 103. No ST abnormalities. Normal intervals.     ECG Results              EKG 12-lead (Final result)  Result time 11/24/23 09:00:40      Final result by Interface, Lab In [facility] (11/24/23 09:00:40)                   Narrative:    Test Reason : R07.9,    Vent. Rate : 103 BPM     Atrial Rate : 103 BPM     P-R Int : 156 ms          QRS Dur : 076 ms      QT Int : 340 ms       P-R-T Axes : 068 077 045 degrees     QTc Int : 445 ms    Sinus tachycardia  Cannot rule out Anterior infarct ,age undetermined  Abnormal ECG    Confirmed by Luis Eduardo Kim MD ([id number]) on 11/24/2023 9:00:31 AM    Referred By: [email or web address]   SELF           Confirmed By:Luis Eduardo Kim MD                                  Imaging Results              X-Ray Chest AP Portable (Final result)  Result time 11/22/23 17:58:46      Final result by Ray Hair MD (11/22/23 17:58:46)                   Impression:      No acute process.      Electronically signed by: Ray Hair MD  Date:    11/22/2023  Time:    17:58               Narrative:    EXAMINATION:  XR CHEST AP PORTABLE    CLINICAL HISTORY:  Chest pain, unspecified    TECHNIQUE:  Single frontal view of the chest was performed.    COMPARISON:  12/13/2022.    FINDINGS:  Monitoring EKG leads are present.  The trachea is unremarkable.  There are calcifications of the aortic knob.  The cardiomediastinal silhouette is within normal limits.  There is no evidence of free air beneath the hemidiaphragms.  There are no pleural effusions.  There is no evidence of a pneumothorax.  There is no evidence of  pneumomediastinum..  No airspace opacity is present.  The osseous structures are unremarkable.                                       Medications   sodium chloride 0.9% bolus 1,000 mL 1,000 mL (0 mLs Intravenous Stopped 11/22/23 1817)   ketorolac injection 9.999 mg (9.999 mg Intravenous Given 11/22/23 1722)     Medical Decision Making  Initial impression: Patient presents with myalgias, diarrhea, chest discomfort, fever for the last two days, and elevated blood pressure. Symptoms most consistent with viral syndrome with reactive hypertension. Plan labs including cardiac enzyme, COVID, and flu testing.    Differential Diagnosis: Viral upper respiratory infection, sinusitis, allergic rhinitis, bronchitis, influenza, pneumonia, dental infection, pharyngitis         Amount and/or Complexity of Data Reviewed  Labs: ordered.  Radiology: ordered.  ECG/medicine tests: ordered and independent interpretation performed.    Risk  Prescription drug management.      Additional MDM:   Heart Score:    History:          Slightly suspicious.  ECG:             Normal  Age:               45-65 years  Risk factors: 1-2 risk factors  Troponin:       Less than or equal to normal limit  Heart Score = 2                ED Course as of 11/28/23 0657   Wed Nov 22, 2023   1806 Patient's labs overall reassuring.  Influenza and COVID tests are negative.  Patient with negative cardiac enzymes, chemistry reassuring with mildly low potassium which is due to diarrhea and will self-correct.  Patient felt significantly better with Toradol and IV fluids, will discharge with supportive care.  [LF]   1806 Patient improved with treatment in the emergency department and comfortable going home. Discussed reasons to return and importance of followup.  Patient understands that the emergency visit today is primarily to address immediate concerns and to rule out emergent cause of symptoms and that they may require further workup and evaluation as an outpatient.  All questions addressed and patient given discharge instructions and followup information.    [LF]      ED Course User Index  [LF] Alisia Coker MD                         Clinical Impression:  Final diagnoses:  [R07.9] Chest pain  [R07.89] Atypical chest pain  [B34.9] Viral syndrome (Primary)          ED Disposition Condition    Discharge Stable          ED Prescriptions       Medication Sig Dispense Start Date End Date Auth. Provider    ondansetron (ZOFRAN-ODT) 4 MG TbDL Take 1 tablet (4 mg total) by mouth every 8 (eight) hours as needed (nausea). 30 tablet 11/22/2023 -- Alisia Coker MD          Follow-up Information       Follow up With Specialties Details Why Contact Info    Hiwot Shah MD Family Medicine Schedule an appointment as soon as possible for a visit   [address]  [address]  [facility] [address]  [phone]               Alisia Coker MD  11/28/23 0657

## 2023-11-22 NOTE — Clinical Note
"Rama Blair (Leana) was seen and treated in our emergency department on 11/22/2023.  She may return to work on 11/27/2023.       If you have any questions or concerns, please don't hesitate to call.      you HENSON    "

## 2023-11-22 NOTE — ED TRIAGE NOTES
46 YO FEMALE REPORTS TO ED WITH CO MID-STERNAL CHEST PAIN AND NECK PAIN ONSET YESTERDAY. REPORTS SOB WORSENING WITH EXERTION. DENIES PAIN WITH INSPIRATION. REPORTS HAVING A SORE THROAT LAST WEEK, DENIES CONGESTION. REPORTS HEADACHE AND SUBJECTIVE FEVERS. TYLENOL LAST TAKEN AT NOON TODAY. AAOX4.

## 2023-11-23 NOTE — DISCHARGE INSTRUCTIONS
Take Tylenol as needed for pain or fever, up to 1000 mg every 4-6 hours.  Take Zofran as needed for nausea.  Make sure you get enough rest, drink lots of fluids and wash hands frequently.     Thank you for allowing me to care for you today.  I hope our treatment plan will make you feel better in the next few days.  In order for me to take better care of my future patients and improve our Emergency Department, I would appreciate if you can provide us with feedback.  In the next few days, you may receive a survey in the mail.  If you do, it would mean a great deal to me if you would please take the time to complete it.    Alisia Coker MD, MPH

## 2023-12-06 ENCOUNTER — PATIENT MESSAGE (OUTPATIENT)
Dept: PRIMARY CARE CLINIC | Facility: CLINIC | Age: 48
End: 2023-12-06
Payer: MEDICAID

## 2023-12-12 ENCOUNTER — PATIENT MESSAGE (OUTPATIENT)
Dept: ADMINISTRATIVE | Facility: OTHER | Age: 48
End: 2023-12-12
Payer: MEDICAID

## 2023-12-21 ENCOUNTER — HOSPITAL ENCOUNTER (OUTPATIENT)
Dept: RADIOLOGY | Facility: OTHER | Age: 48
Discharge: HOME | End: 2023-12-21
Attending: FAMILY MEDICINE
Payer: MEDICAID

## 2023-12-21 DIAGNOSIS — Z12.31 ENCOUNTER FOR SCREENING MAMMOGRAM FOR BREAST CANCER: ICD-10-CM

## 2023-12-21 PROCEDURE — 77063 BREAST TOMOSYNTHESIS BI: CPT | Mod: 26,,, | Performed by: RADIOLOGY

## 2023-12-21 PROCEDURE — 77067 MAMMO DIGITAL SCREENING BILAT WITH TOMO: ICD-10-PCS | Mod: 26,,, | Performed by: RADIOLOGY

## 2023-12-21 PROCEDURE — 77067 SCR MAMMO BI INCL CAD: CPT | Mod: 26,,, | Performed by: RADIOLOGY

## 2023-12-21 PROCEDURE — 77063 MAMMO DIGITAL SCREENING BILAT WITH TOMO: ICD-10-PCS | Mod: 26,,, | Performed by: RADIOLOGY

## 2023-12-21 PROCEDURE — 77067 SCR MAMMO BI INCL CAD: CPT | Mod: TC

## 2024-01-23 ENCOUNTER — PATIENT MESSAGE (OUTPATIENT)
Dept: PRIMARY CARE CLINIC | Facility: CLINIC | Age: 49
End: 2024-01-23
Payer: MEDICAID

## 2024-01-23 ENCOUNTER — TELEPHONE (OUTPATIENT)
Dept: PRIMARY CARE CLINIC | Facility: CLINIC | Age: 49
End: 2024-01-23
Payer: MEDICAID

## 2024-01-23 NOTE — TELEPHONE ENCOUNTER
----- Message from Swathi Sanchez sent at 1/23/2024 10:45 AM CST -----  Contact: mobile [phone]  Patient would like to come sometime in the evening to have some paperwork filled out, please give the patient a call.

## 2024-01-30 ENCOUNTER — LAB VISIT (OUTPATIENT)
Dept: LAB | Facility: HOSPITAL | Age: 49
End: 2024-01-30
Attending: FAMILY MEDICINE
Payer: MEDICAID

## 2024-01-30 DIAGNOSIS — I10 HTN (HYPERTENSION), BENIGN: ICD-10-CM

## 2024-01-30 LAB — POTASSIUM SERPL-SCNC: 3.5 MMOL/L (ref 3.5–5.1)

## 2024-01-30 PROCEDURE — 84132 ASSAY OF SERUM POTASSIUM: CPT | Performed by: FAMILY MEDICINE

## 2024-01-30 PROCEDURE — 36415 COLL VENOUS BLD VENIPUNCTURE: CPT | Mod: PN | Performed by: FAMILY MEDICINE

## 2024-02-06 ENCOUNTER — PATIENT MESSAGE (OUTPATIENT)
Dept: OTHER | Facility: OTHER | Age: 49
End: 2024-02-06
Payer: MEDICAID

## 2024-02-06 DIAGNOSIS — I10 HTN (HYPERTENSION), BENIGN: ICD-10-CM

## 2024-02-06 RX ORDER — LOSARTAN POTASSIUM 100 MG/1
100 TABLET ORAL
Qty: 90 TABLET | Refills: 0 | Status: SHIPPED | OUTPATIENT
Start: 2024-02-06

## 2024-02-06 NOTE — TELEPHONE ENCOUNTER
No care due was identified.  [facility] Embedded Care Due Messages. Reference number: [id number].   2/06/2024 5:46:35 AM CST

## 2024-02-07 ENCOUNTER — PATIENT MESSAGE (OUTPATIENT)
Dept: PRIMARY CARE CLINIC | Facility: CLINIC | Age: 49
End: 2024-02-07
Payer: MEDICAID

## 2024-02-07 NOTE — TELEPHONE ENCOUNTER
She has a virtual pre op appt scheduled on 2/15/24. Is a virtual ok or do it need to be in office? She has the forms attached to a previous message.

## 2024-02-15 ENCOUNTER — OFFICE VISIT (OUTPATIENT)
Dept: PRIMARY CARE CLINIC | Facility: CLINIC | Age: 49
End: 2024-02-15
Payer: MEDICAID

## 2024-02-15 VITALS
BODY MASS INDEX: 44.61 KG/M2 | OXYGEN SATURATION: 97 % | DIASTOLIC BLOOD PRESSURE: 78 MMHG | WEIGHT: 284.19 LBS | TEMPERATURE: 98 F | HEIGHT: 67 IN | HEART RATE: 85 BPM | SYSTOLIC BLOOD PRESSURE: 132 MMHG

## 2024-02-15 DIAGNOSIS — I10 HTN (HYPERTENSION), BENIGN: ICD-10-CM

## 2024-02-15 DIAGNOSIS — E78.2 MIXED HYPERLIPIDEMIA: ICD-10-CM

## 2024-02-15 DIAGNOSIS — Z01.810 PREOP CARDIOVASCULAR EXAM: Primary | ICD-10-CM

## 2024-02-15 DIAGNOSIS — E66.01 CLASS 3 SEVERE OBESITY DUE TO EXCESS CALORIES WITH SERIOUS COMORBIDITY AND BODY MASS INDEX (BMI) OF 40.0 TO 44.9 IN ADULT: ICD-10-CM

## 2024-02-15 PROBLEM — J02.9 SORE THROAT: Status: RESOLVED | Noted: 2023-07-06 | Resolved: 2024-02-15

## 2024-02-15 PROBLEM — R05.1 ACUTE COUGH: Status: RESOLVED | Noted: 2023-07-06 | Resolved: 2024-02-15

## 2024-02-15 PROBLEM — B30.9 ACUTE VIRAL CONJUNCTIVITIS OF BOTH EYES: Status: RESOLVED | Noted: 2023-07-06 | Resolved: 2024-02-15

## 2024-02-15 PROBLEM — E78.01 FAMILIAL HYPERCHOLESTEREMIA: Status: RESOLVED | Noted: 2021-07-06 | Resolved: 2024-02-15

## 2024-02-15 PROCEDURE — 3075F SYST BP GE 130 - 139MM HG: CPT | Mod: CPTII,,, | Performed by: FAMILY MEDICINE

## 2024-02-15 PROCEDURE — 99214 OFFICE O/P EST MOD 30 MIN: CPT | Mod: S$PBB,,, | Performed by: FAMILY MEDICINE

## 2024-02-15 PROCEDURE — 4010F ACE/ARB THERAPY RXD/TAKEN: CPT | Mod: CPTII,,, | Performed by: FAMILY MEDICINE

## 2024-02-15 PROCEDURE — 93010 ELECTROCARDIOGRAM REPORT: CPT | Mod: S$PBB,,, | Performed by: INTERNAL MEDICINE

## 2024-02-15 PROCEDURE — 93005 ELECTROCARDIOGRAM TRACING: CPT | Mod: PBBFAC,PN | Performed by: INTERNAL MEDICINE

## 2024-02-15 PROCEDURE — 99214 OFFICE O/P EST MOD 30 MIN: CPT | Mod: PBBFAC,PN,25 | Performed by: FAMILY MEDICINE

## 2024-02-15 PROCEDURE — 1159F MED LIST DOCD IN RCRD: CPT | Mod: CPTII,,, | Performed by: FAMILY MEDICINE

## 2024-02-15 PROCEDURE — 3078F DIAST BP <80 MM HG: CPT | Mod: CPTII,,, | Performed by: FAMILY MEDICINE

## 2024-02-15 PROCEDURE — 1160F RVW MEDS BY RX/DR IN RCRD: CPT | Mod: CPTII,,, | Performed by: FAMILY MEDICINE

## 2024-02-15 PROCEDURE — 3008F BODY MASS INDEX DOCD: CPT | Mod: CPTII,,, | Performed by: FAMILY MEDICINE

## 2024-02-15 PROCEDURE — 99999 PR PBB SHADOW E&M-EST. PATIENT-LVL IV: CPT | Mod: PBBFAC,,, | Performed by: FAMILY MEDICINE

## 2024-02-15 RX ORDER — TRIAZOLAM 0.25 MG/1
TABLET ORAL
COMMUNITY
Start: 2023-10-27 | End: 2024-06-11

## 2024-02-15 NOTE — ASSESSMENT & PLAN NOTE
Gastric bypass by Dr Leo Klein  , surgical clinic [facility]    Denies any current chest pain shortness of breath, no current illness, no reason not to have surgery.  No history of clots. No problems with anesthesia in the past  .  Blood pressure and vital signs normal today    We reviewed recent testing   EKG normal sinus rhythm  Chest x-ray clear  CBC CMP normal    STOP ALL NSAIDS ( like Advil (Ibuprofen)  , Alleve (naproxen) , Mobic (meloxicam), Celebrex, Aspirin, Plavix, Omega Fatty Acids/ Fish Oil )  One week prior to surgery so you don't bleed excessively

## 2024-02-15 NOTE — PROGRESS NOTES
Assessment:     1. Preop cardiovascular exam    2. Class 3 severe obesity due to excess calories with serious comorbidity and body mass index (BMI) of 40.0 to 44.9 in adult    3. HTN (hypertension), benign    4. Mixed hyperlipidemia      Plan:     Preop cardiovascular exam  Gastric bypass by Dr Leo Klein  , surgical [facility]    Denies any current chest pain shortness of breath, no current illness, no reason not to have surgery.  No history of clots. No problems with anesthesia in the past  .  Blood pressure and vital signs normal today    We reviewed recent testing   EKG normal sinus rhythm  Chest x-ray clear  CBC CMP normal    STOP ALL NSAIDS ( like Advil (Ibuprofen)  , Alleve (naproxen) , Mobic (meloxicam), Celebrex, Aspirin, Plavix, Omega Fatty Acids/ Fish Oil )  One week prior to surgery so you don't bleed excessively    Class 3 severe obesity due to excess calories with serious comorbidity and body mass index (BMI) of 40.0 to 44.9 in adult  Planning Bariatric surgery    HTN (hypertension), benign  Stable, continue losartan 100 x1    Mixed hyperlipidemia  Stable, continue Ezetimibe 10 x 1  Rosuvastatin 40 x 1  Move more, sit less  High fiber, good fat (avocado, olive oil, nuts) foods  Try to eat 5 fresh COLORS a day          CHIEF COMPLAINT: Preop    HPI: Rama Blair is a 48 y.o. female with is here today for preop       Gastric bypass by Dr Leo Klein  , [facility]    2023 CXR clear    11/2023 CBC CMP nml    2022 Echo  The left ventricle is normal in size with normal systolic function.  The estimated ejection fraction is 60%.  Normal left ventricular diastolic function.  Normal right ventricular size with normal right ventricular systolic function.  The estimated PA systolic pressure is 28 mmHg.  Normal central venous pressure (3 mmHg).    Denies chest pain, shortness of breath    Current Outpatient Medications   Medication Instructions    albuterol  "(PROVENTIL/VENTOLIN HFA) 90 mcg/actuation inhaler INHALE 1 PUFFS BY MOUTH INTO THE LUNGS EVERY 6 HOURS AS NEEDED FOR WHEEZING. RESCUE    blood pressure monitor (IHEALTH EASE) XL arm size (OP) Other, As needed (PRN)    ezetimibe (ZETIA) 10 mg, Oral, Daily    hydroCHLOROthiazide (HYDRODIURIL) 25 MG tablet TAKE 1 TABLET(25 MG) BY MOUTH EVERY DAY    losartan (COZAAR) 100 mg, Oral    ondansetron (ZOFRAN-ODT) 4 mg, Oral, Every 8 hours PRN    pantoprazole (PROTONIX) 40 MG tablet TAKE 1 TABLET(40 MG) BY MOUTH EVERY DAY    potassium citrate (UROCIT-K 15) 15 mEq TbSR 2 tablets, Oral, Daily    pregabalin (LYRICA) 100 mg, Oral, 2 times daily    rosuvastatin (CRESTOR) 40 mg, Oral, Nightly    traZODone (DESYREL) 50 mg, Oral, Nightly PRN    triazolam (HALCION) 0.25 MG Tab Oral       Lab Results   Component Value Date    HGBA1C 5.5 05/24/2022    HGBA1C 5.4 05/06/2015    HGBA1C 5.3 08/01/2012     No results found for: "MICALBCREAT"  Lab Results   Component Value Date    LDLCALC 113.4 11/03/2023    LDLCALC 121.8 11/14/2022    CHOL 181 11/03/2023    HDL 53 11/03/2023    TRIG 73 11/03/2023       Lab Results   Component Value Date     11/22/2023    K 3.5 01/30/2024     11/22/2023    CO2 23 11/22/2023    GLU 93 11/22/2023    BUN 13 11/22/2023    CREATININE 1.0 11/22/2023    CALCIUM 9.1 11/22/2023    PROT 8.1 11/22/2023    ALBUMIN 3.8 11/22/2023    BILITOT 0.6 11/22/2023    ALKPHOS 58 11/22/2023    AST 26 11/22/2023    ALT 14 11/22/2023    ANIONGAP 15 11/22/2023    ESTGFRAFRICA >60 06/27/2022    EGFRNONAA >60 06/27/2022    WBC 5.28 11/22/2023    HGB 12.4 11/22/2023    HGB 11.5 (L) 11/03/2023    HCT 38.8 11/22/2023    MCV 88 11/22/2023     11/22/2023    TSH 2.942 05/09/2022    HEPCAB Negative 11/22/2023       Lab Results   Component Value Date    WEOQRWNT32 984 (H) 08/20/2021    FERRITIN 57 05/09/2022    IRON 47 05/09/2022    TRANSFERRIN 215 05/09/2022    TIBC 318 05/09/2022    FESATURATED 15 (L) 05/09/2022         Past " Medical History:   Diagnosis Date    Anemia, unspecified     Constipation     GI Dr Reece 2014    COVID-19     Elevated platelet count 03/11/2021    Refer hematol 3/2021    Familial hypercholesteremia     per digital lipid    Gastritis 07/30/2021 2021    Gastroesophageal reflux disease without esophagitis 08/28/2018    She states she had EGD in past    Grief 09/19/2017    mom passed 2017    HTN (hypertension), benign 03/10/2015    Digital med 12/20    Intestinal erosis 09/17/2021    CS 2021    Iron deficiency anemia secondary to inadequate dietary iron intake 08/26/2021    Start OTC 8/21    Knee pain, left 2012    scope, Dr Reyes, injury at work, treadmill helps with stiffness    Low blood potassium 07/30/2021    avoid HCTZ    Mixed hyperlipidemia 08/29/2013    Morbid obesity 02/23/2015    bariatrics not covered by insurance    SOB (shortness of breath)     Spinal cord stimulator status 08/28/2018    In back for L knee pain, Dr Kessler surgery & pain mgmt, Lyrica or Hydrocodone at night    Thyroid disease      Past Surgical History:   Procedure Laterality Date    COLONOSCOPY N/A 9/17/2021    Procedure: COLONOSCOPY;  Surgeon: Keith Mckeon MD;  Location: [facility] (4TH FLR);  Service: Endoscopy;  Laterality: N/A;  prep ins. emailed / COVID screening [facility] 2nd floor 9/14/21- ERW    DILATION AND CURETTAGE OF UTERUS      ESOPHAGOGASTRODUODENOSCOPY N/A 7/27/2021    Procedure: ESOPHAGOGASTRODUODENOSCOPY (EGD);  Surgeon: Keith Mckeon MD;  Location: [facility] (4TH FLR);  Service: Endoscopy;  Laterality: N/A;  COVID test 7/24 [facility]    HYSTEROSCOPY WITH DILATION AND CURETTAGE OF UTERUS N/A 9/21/2022    Procedure: HYSTEROSCOPY, WITH DILATION AND CURETTAGE OF UTERUS;  Surgeon: Kim Juarez MD;  Location: [facility];  Service: OB/GYN;  Laterality: N/A;    KNEE ARTHROSCOPY      Eric MOHAMUD    KNEE SURGERY      SKIN GRAFT      left finger due to injury    SPINAL CORD STIMULATOR IMPLANT      to L knee, Dr Kessler    TUBAL  "LIGATION       Vitals:    02/15/24 1416   BP: 132/78   Pulse: 85   Temp: 98 °F (36.7 °C)   TempSrc: Oral   SpO2: 97%   Weight: 128.9 kg (284 lb 2.8 oz)   Height: 5' 7" (1.702 m)   PainSc: 0-No pain     Wt Readings from Last 5 Encounters:   02/15/24 128.9 kg (284 lb 2.8 oz)   11/22/23 123.8 kg (273 lb)   11/08/23 126.9 kg (279 lb 12.2 oz)   09/19/23 113.4 kg (250 lb)   07/06/23 128.4 kg (283 lb 1.1 oz)     Objective:   Physical Exam  Cardiovascular:      Rate and Rhythm: Normal rate and regular rhythm.      Heart sounds: Normal heart sounds.   Pulmonary:      Effort: Pulmonary effort is normal. No respiratory distress.      Breath sounds: Normal breath sounds.   Psychiatric:         Behavior: Behavior normal.         Thought Content: Thought content normal.         Judgment: Judgment normal.                                     "

## 2024-02-15 NOTE — ASSESSMENT & PLAN NOTE
Stable, continue Ezetimibe 10 x 1  Rosuvastatin 40 x 1  Move more, sit less  High fiber, good fat (avocado, olive oil, nuts) foods  Try to eat 5 fresh COLORS a day

## 2024-02-16 LAB
OHS QRS DURATION: 76 MS
OHS QTC CALCULATION: 431 MS

## 2024-03-04 ENCOUNTER — PATIENT MESSAGE (OUTPATIENT)
Dept: PRIMARY CARE CLINIC | Facility: CLINIC | Age: 49
End: 2024-03-04
Payer: MEDICAID

## 2024-03-04 DIAGNOSIS — T85.192A SPINAL CORD STIMULATOR DYSFUNCTION, INITIAL ENCOUNTER: ICD-10-CM

## 2024-03-04 DIAGNOSIS — Z96.89 SPINAL CORD STIMULATOR STATUS: Primary | ICD-10-CM

## 2024-03-04 NOTE — TELEPHONE ENCOUNTER
Pt requesting External referral to general surgery to remove spinal cord device out of her  back because its not working and it has moved, it feels like its in the middle part of the spine .

## 2024-03-05 ENCOUNTER — PATIENT MESSAGE (OUTPATIENT)
Dept: PRIMARY CARE CLINIC | Facility: CLINIC | Age: 49
End: 2024-03-05
Payer: MEDICAID

## 2024-03-06 ENCOUNTER — TELEPHONE (OUTPATIENT)
Dept: PRIMARY CARE CLINIC | Facility: CLINIC | Age: 49
End: 2024-03-06
Payer: MEDICAID

## 2024-03-06 NOTE — TELEPHONE ENCOUNTER
----- Message from Yas Sanchez sent at 3/6/2024 12:06 PM CST -----  Regarding: referral  Type: Patient Call Back    Who called: p/t    What is the request in detail: p/t is calling to schedule referral for general surgery. She states that her spinal stimulator has shifted and it is not working, I tried to schedule but did not want to schedule wrong. Please call to assist.     Can the clinic reply by [facility]? Yes     Would the patient rather a call back or a response via [facility]?     Best call back number: [phone]    Additional Information:

## 2024-03-06 NOTE — TELEPHONE ENCOUNTER
Pt informed that referral has been faxed to [facility] per her request. She has to contact them to schedule.

## 2024-04-03 ENCOUNTER — PATIENT MESSAGE (OUTPATIENT)
Dept: ADMINISTRATIVE | Facility: OTHER | Age: 49
End: 2024-04-03
Payer: MEDICAID

## 2024-04-26 ENCOUNTER — HOSPITAL ENCOUNTER (EMERGENCY)
Facility: OTHER | Age: 49
Discharge: HOME | End: 2024-04-26
Attending: EMERGENCY MEDICINE
Payer: MEDICAID

## 2024-04-26 VITALS
WEIGHT: 274 LBS | OXYGEN SATURATION: 99 % | RESPIRATION RATE: 18 BRPM | SYSTOLIC BLOOD PRESSURE: 175 MMHG | BODY MASS INDEX: 43 KG/M2 | DIASTOLIC BLOOD PRESSURE: 78 MMHG | HEART RATE: 86 BPM | TEMPERATURE: 98 F | HEIGHT: 67 IN

## 2024-04-26 DIAGNOSIS — I10 HYPERTENSION, UNSPECIFIED TYPE: ICD-10-CM

## 2024-04-26 DIAGNOSIS — R19.7 DIARRHEA, UNSPECIFIED TYPE: ICD-10-CM

## 2024-04-26 DIAGNOSIS — R11.2 NAUSEA AND VOMITING, UNSPECIFIED VOMITING TYPE: ICD-10-CM

## 2024-04-26 DIAGNOSIS — M54.9 BACK PAIN, UNSPECIFIED BACK LOCATION, UNSPECIFIED BACK PAIN LATERALITY, UNSPECIFIED CHRONICITY: Primary | ICD-10-CM

## 2024-04-26 LAB
ALBUMIN SERPL BCP-MCNC: 3.4 G/DL (ref 3.5–5.2)
ALP SERPL-CCNC: 61 U/L (ref 55–135)
ALT SERPL W/O P-5'-P-CCNC: 13 U/L (ref 10–44)
ANION GAP SERPL CALC-SCNC: 10 MMOL/L (ref 8–16)
AST SERPL-CCNC: 20 U/L (ref 10–40)
BACTERIA #/AREA URNS HPF: ABNORMAL /HPF
BASOPHILS # BLD AUTO: 0.03 K/UL (ref 0–0.2)
BASOPHILS NFR BLD: 0.3 % (ref 0–1.9)
BILIRUB SERPL-MCNC: 0.4 MG/DL (ref 0.1–1)
BILIRUB UR QL STRIP: ABNORMAL
BUN SERPL-MCNC: 27 MG/DL (ref 6–20)
CALCIUM SERPL-MCNC: 9.4 MG/DL (ref 8.7–10.5)
CHLORIDE SERPL-SCNC: 105 MMOL/L (ref 95–110)
CLARITY UR: CLEAR
CO2 SERPL-SCNC: 25 MMOL/L (ref 23–29)
COLOR UR: YELLOW
CREAT SERPL-MCNC: 1.1 MG/DL (ref 0.5–1.4)
DIFFERENTIAL METHOD BLD: ABNORMAL
EOSINOPHIL # BLD AUTO: 0.1 K/UL (ref 0–0.5)
EOSINOPHIL NFR BLD: 0.9 % (ref 0–8)
ERYTHROCYTE [DISTWIDTH] IN BLOOD BY AUTOMATED COUNT: 14 % (ref 11.5–14.5)
EST. GFR  (NO RACE VARIABLE): >60 ML/MIN/1.73 M^2
GLUCOSE SERPL-MCNC: 96 MG/DL (ref 70–110)
GLUCOSE UR QL STRIP: NEGATIVE
HCT VFR BLD AUTO: 36.9 % (ref 37–48.5)
HGB BLD-MCNC: 11.4 G/DL (ref 12–16)
HGB UR QL STRIP: ABNORMAL
IMM GRANULOCYTES # BLD AUTO: 0.02 K/UL (ref 0–0.04)
IMM GRANULOCYTES NFR BLD AUTO: 0.2 % (ref 0–0.5)
KETONES UR QL STRIP: ABNORMAL
LEUKOCYTE ESTERASE UR QL STRIP: NEGATIVE
LIPASE SERPL-CCNC: 22 U/L (ref 4–60)
LYMPHOCYTES # BLD AUTO: 1.7 K/UL (ref 1–4.8)
LYMPHOCYTES NFR BLD: 19.5 % (ref 18–48)
MAGNESIUM SERPL-MCNC: 2.1 MG/DL (ref 1.6–2.6)
MCH RBC QN AUTO: 27.7 PG (ref 27–31)
MCHC RBC AUTO-ENTMCNC: 30.9 G/DL (ref 32–36)
MCV RBC AUTO: 90 FL (ref 82–98)
MICROSCOPIC COMMENT: ABNORMAL
MONOCYTES # BLD AUTO: 0.7 K/UL (ref 0.3–1)
MONOCYTES NFR BLD: 7.7 % (ref 4–15)
NEUTROPHILS # BLD AUTO: 6.3 K/UL (ref 1.8–7.7)
NEUTROPHILS NFR BLD: 71.4 % (ref 38–73)
NITRITE UR QL STRIP: NEGATIVE
NRBC BLD-RTO: 0 /100 WBC
PH UR STRIP: 6 [PH] (ref 5–8)
PLATELET # BLD AUTO: 304 K/UL (ref 150–450)
PMV BLD AUTO: 9.7 FL (ref 9.2–12.9)
POTASSIUM SERPL-SCNC: 3.9 MMOL/L (ref 3.5–5.1)
PROT SERPL-MCNC: 7.4 G/DL (ref 6–8.4)
PROT UR QL STRIP: NEGATIVE
RBC # BLD AUTO: 4.12 M/UL (ref 4–5.4)
RBC #/AREA URNS HPF: 7 /HPF (ref 0–4)
SODIUM SERPL-SCNC: 140 MMOL/L (ref 136–145)
SP GR UR STRIP: >=1.03 (ref 1–1.03)
SQUAMOUS #/AREA URNS HPF: 2 /HPF
URN SPEC COLLECT METH UR: ABNORMAL
UROBILINOGEN UR STRIP-ACNC: NEGATIVE EU/DL
WBC # BLD AUTO: 8.85 K/UL (ref 3.9–12.7)
WBC #/AREA URNS HPF: 2 /HPF (ref 0–5)

## 2024-04-26 PROCEDURE — 83690 ASSAY OF LIPASE: CPT | Performed by: EMERGENCY MEDICINE

## 2024-04-26 PROCEDURE — 80053 COMPREHEN METABOLIC PANEL: CPT | Performed by: EMERGENCY MEDICINE

## 2024-04-26 PROCEDURE — 63600175 PHARM REV CODE 636 W HCPCS: Performed by: EMERGENCY MEDICINE

## 2024-04-26 PROCEDURE — 99285 EMERGENCY DEPT VISIT HI MDM: CPT | Mod: 25

## 2024-04-26 PROCEDURE — 96375 TX/PRO/DX INJ NEW DRUG ADDON: CPT

## 2024-04-26 PROCEDURE — 25000003 PHARM REV CODE 250: Performed by: EMERGENCY MEDICINE

## 2024-04-26 PROCEDURE — 81000 URINALYSIS NONAUTO W/SCOPE: CPT | Performed by: EMERGENCY MEDICINE

## 2024-04-26 PROCEDURE — 96365 THER/PROPH/DIAG IV INF INIT: CPT

## 2024-04-26 PROCEDURE — 83735 ASSAY OF MAGNESIUM: CPT | Performed by: EMERGENCY MEDICINE

## 2024-04-26 PROCEDURE — 96361 HYDRATE IV INFUSION ADD-ON: CPT

## 2024-04-26 PROCEDURE — 85025 COMPLETE CBC W/AUTO DIFF WBC: CPT | Performed by: EMERGENCY MEDICINE

## 2024-04-26 RX ORDER — DIPHENOXYLATE HYDROCHLORIDE AND ATROPINE SULFATE 2.5; .025 MG/1; MG/1
1 TABLET ORAL 4 TIMES DAILY PRN
Qty: 15 TABLET | Refills: 0 | Status: SHIPPED | OUTPATIENT
Start: 2024-04-26 | End: 2024-05-06

## 2024-04-26 RX ORDER — MORPHINE SULFATE 10 MG/ML
4 INJECTION INTRAMUSCULAR; INTRAVENOUS; SUBCUTANEOUS EVERY 4 HOURS PRN
Status: DISCONTINUED | OUTPATIENT
Start: 2024-04-26 | End: 2024-04-27 | Stop reason: HOSPADM

## 2024-04-26 RX ORDER — HYDROCODONE BITARTRATE AND ACETAMINOPHEN 5; 325 MG/1; MG/1
1 TABLET ORAL EVERY 6 HOURS PRN
Qty: 12 TABLET | Refills: 0 | Status: SHIPPED | OUTPATIENT
Start: 2024-04-26 | End: 2024-06-11

## 2024-04-26 RX ORDER — ONDANSETRON HYDROCHLORIDE 2 MG/ML
4 INJECTION, SOLUTION INTRAVENOUS
Status: COMPLETED | OUTPATIENT
Start: 2024-04-26 | End: 2024-04-26

## 2024-04-26 RX ORDER — LOPERAMIDE HYDROCHLORIDE 2 MG/1
2 CAPSULE ORAL
Status: COMPLETED | OUTPATIENT
Start: 2024-04-26 | End: 2024-04-26

## 2024-04-26 RX ORDER — ONDANSETRON 4 MG/1
4 TABLET, ORALLY DISINTEGRATING ORAL EVERY 6 HOURS PRN
Qty: 15 TABLET | Refills: 0 | Status: SHIPPED | OUTPATIENT
Start: 2024-04-26 | End: 2024-06-11

## 2024-04-26 RX ORDER — HYDROCODONE BITARTRATE AND ACETAMINOPHEN 5; 325 MG/1; MG/1
1 TABLET ORAL
Status: COMPLETED | OUTPATIENT
Start: 2024-04-26 | End: 2024-04-26

## 2024-04-26 RX ORDER — KETOROLAC TROMETHAMINE 30 MG/ML
10 INJECTION, SOLUTION INTRAMUSCULAR; INTRAVENOUS
Status: COMPLETED | OUTPATIENT
Start: 2024-04-26 | End: 2024-04-26

## 2024-04-26 RX ADMIN — SODIUM CHLORIDE 1000 ML: 0.9 INJECTION, SOLUTION INTRAVENOUS at 07:04

## 2024-04-26 RX ADMIN — LOPERAMIDE HYDROCHLORIDE 2 MG: 2 CAPSULE ORAL at 10:04

## 2024-04-26 RX ADMIN — KETOROLAC TROMETHAMINE 10 MG: 30 INJECTION, SOLUTION INTRAMUSCULAR; INTRAVENOUS at 07:04

## 2024-04-26 RX ADMIN — HYDROCODONE BITARTRATE AND ACETAMINOPHEN 1 TABLET: 5; 325 TABLET ORAL at 10:04

## 2024-04-26 RX ADMIN — ONDANSETRON 4 MG: 2 INJECTION INTRAMUSCULAR; INTRAVENOUS at 10:04

## 2024-04-26 RX ADMIN — MORPHINE SULFATE 4 MG: 10 INJECTION INTRAVENOUS at 08:04

## 2024-04-26 NOTE — Clinical Note
"Rama Northjamil Blair was seen and treated in our emergency department on 4/26/2024.  She may return to work on 04/29/2024.       If you have any questions or concerns, please don't hesitate to call.      Mike Vogt MD"

## 2024-04-27 NOTE — DISCHARGE INSTRUCTIONS
Mrs. Blair,    Thank you for letting me care for you today! It was nice meeting you, and I hope you feel better soon.   If you would like access to your chart and what was done today please utilize the [facility].   Please come back to [facility] for all of your future medical needs.    Our goal in the emergency department is to always give you outstanding care and exceptional service. You may receive a survey by mail or e-mail in the next week regarding your experience in our ED. We would greatly appreciate you completing and returning the survey. Your feedback provides us with a way to recognize our staff who give very good care and it helps us learn how to improve when your experience was below our aspiration of excellence.     Sincerely,    Mike Vogt MD  Board Certified Emergency Physician

## 2024-04-27 NOTE — ED PROVIDER NOTES
Encounter Date: 4/26/2024       History     Chief Complaint   Patient presents with    Back Pain     Pt reporting L back pain x 1 day with vomiting and diarrhea. Denies dysuria/hematuria.      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  Nothing has helped with this pain but it has worsened and progressed through the day prompting her to seek emergent care. She had a removal of a recent stimulator from the right back while the leads were left in place as a result of needing a significant interventional procedure for removal.   She denies fevers, chills or known sick contacts.       Review of patient's allergies indicates:  No Known Allergies  Past Medical History:   Diagnosis Date    Anemia, unspecified     Constipation     GI Dr Reece 2014    COVID-19     Elevated platelet count 03/11/2021    Refer hematol 3/2021    Familial hypercholesteremia     per digital lipid    Gastritis 07/30/2021 2021    Gastroesophageal reflux disease without esophagitis 08/28/2018    She states she had EGD in past    Grief 09/19/2017    mom passed 2017    HTN (hypertension), benign 03/10/2015    Digital med 12/20    Intestinal erosis 09/17/2021    CS 2021    Iron deficiency anemia secondary to inadequate dietary iron intake 08/26/2021    Start OTC 8/21    Knee pain, left 2012    scope, Dr Reyes, injury at work, treadmill helps with stiffness    Low blood potassium 07/30/2021    avoid HCTZ    Mixed hyperlipidemia 08/29/2013    Morbid obesity 02/23/2015    bariatrics not covered by insurance    SOB (shortness of breath)     Spinal cord stimulator status 08/28/2018    In back for L knee pain, Dr Kessler surgery & pain mgmt, Lyrica or Hydrocodone at night    Thyroid disease       Past Surgical History:   Procedure Laterality Date    COLONOSCOPY N/A 9/17/2021    Procedure: COLONOSCOPY;  Surgeon: Keith Mckeon MD;  Location: [facility] ENDO (4TH FLR);  Service: Endoscopy;  Laterality: N/A;  prep ins. emailed / COVID screening [facility] 2nd floor 9/14/21- ERW    DILATION AND CURETTAGE OF UTERUS      ESOPHAGOGASTRODUODENOSCOPY N/A 7/27/2021    Procedure: ESOPHAGOGASTRODUODENOSCOPY (EGD);  Surgeon: Keith Mckeon MD;  Location: [facility] ENDO (4TH FLR);  Service: Endoscopy;  Laterality: N/A;  COVID test 7/24 [facility]    HYSTEROSCOPY WITH DILATION AND CURETTAGE OF UTERUS N/A 9/21/2022    Procedure: HYSTEROSCOPY, WITH DILATION AND CURETTAGE OF UTERUS;  Surgeon: Kim Juarez MD;  Location: [facility];  Service: OB/GYN;  Laterality: N/A;    KNEE ARTHROSCOPY      L, Dasa    KNEE SURGERY      SKIN GRAFT      left finger due to injury    SPINAL CORD STIMULATOR IMPLANT      to L knee, Dr Kessler    TUBAL LIGATION       Family History   Problem Relation Name Age of Onset    Hypertension Mother      Diabetes Mother      COPD Mother      Breast cancer Neg Hx      Colon cancer Neg Hx      Ovarian cancer Neg Hx       Social History     Tobacco Use    Smoking status: Never    Smokeless tobacco: Never   Substance Use Topics    Alcohol use: No    Drug use: No     Review of Systems  Constitutional-no fever  HEENT-no congestion  Eyes-no redness  Respiratory-no shortness of breath  Cardio-no chest pain  GI-+ nausea, +diarrhea  Endocrine-no cold intolerance  -no difficulty urinating  MSK-+ back pain  Skin-no rashes  Allergy-no environmental allergy  Neurologic-, no headache  Hematology-no swollen nodes  Behavioral-no confusion   Physical Exam     Initial Vitals [04/26/24 1903]   BP Pulse Resp Temp SpO2   (!) 184/87 89 20 98.5 °F (36.9 °C) 97 %      MAP       --         Physical Exam  Constitutional: uncomfortable appearing 47 yo woman in moderate distress  Eyes: Conjunctivae normal.  ENT       Head: Normocephalic,  atraumatic.       Nose: Normal external appearance        Mouth/Throat: no strigulous respirations   Hematological/Lymphatic/Immunilogical: no visible lymphadenopathy   Cardiovascular: Normal rate,   Respiratory: Normal respiratory effort.   Gastrointestinal:  Rounded, soft, no rebound, no guarding  Musculoskeletal: Normal range of motion in all extremities. No obvious deformities or swelling.  Marked tenderness to palpation along the left flank in the CVA, no fluctuance, no overlying erythema  Neurologic: Alert, oriented. Normal speech and language. No gross focal neurologic deficits are appreciated.  Skin: Skin is warm, dry. No rash noted.  Psychiatric: Mood and affect are normal.   ED Course   Procedures  Labs Reviewed   CBC W/ AUTO DIFFERENTIAL - Abnormal; Notable for the following components:       Result Value    Hemoglobin 11.4 (*)     Hematocrit 36.9 (*)     MCHC 30.9 (*)     All other components within normal limits   COMPREHENSIVE METABOLIC PANEL - Abnormal; Notable for the following components:    BUN 27 (*)     Albumin 3.4 (*)     All other components within normal limits   URINALYSIS, REFLEX TO URINE CULTURE - Abnormal; Notable for the following components:    Specific Gravity, UA >=1.030 (*)     Ketones, UA Trace (*)     Bilirubin (UA) 1+ (*)     Occult Blood UA 1+ (*)     All other components within normal limits    Narrative:     Specimen Source->Urine   URINALYSIS MICROSCOPIC - Abnormal; Notable for the following components:    RBC, UA 7 (*)     Bacteria Few (*)     All other components within normal limits    Narrative:     Specimen Source->Urine   LIPASE   MAGNESIUM          Imaging Results              CT Renal Stone Study ABD Pelvis WO (Final result)  Result time 04/26/24 21:46:55      Final result by Irma Diaz MD (04/26/24 21:46:55)                   Impression:      No nephrolithiasis or hydronephrosis.    3.4 x 1.7 x 2.1 cm focal fluid collection at the site of removed stimulator.   Findings may represent evolving hematoma, seroma, and much less likely abscess.  Remnants of the wire remain.    Diverticulum at the hepatic flexure.      Electronically signed by: Irma Diaz  Date:    04/26/2024  Time:    21:46               Narrative:    EXAMINATION:  CT RENAL STONE STUDY ABDOMEN PELVIS WITHOUT    CLINICAL HISTORY:  Left flank pain 9/10 on set of 1 day presenting with vomiting and diarrhea.    TECHNIQUE:  5 mm unenhanced axial images from the lung bases through the greater trochanters were performed.  Coronal and sagittal reformatted images were provided.    COMPARISON:  09/17/2022    FINDINGS:  Within the limits of a noncontrast examination, the liver, spleen, pancreas, and adrenal glands are unremarkable.  The partially contracted gallbladder contains no calcified gallstones.    There is no nephrolithiasis or hydronephrosis.    There is a 3.4 x 1.7 x 2.1 cm focal fluid collection at the site of removed stimulator in the right posterior flank subcutaneous fat tissues (series 2 axial image 58 and series 601 coronal image 111).  There remaining remnants of the wire including where it enters the spinal canal at L4.    There is no gross abdominal adenopathy or ascites. There is a tiny fat containing umbilical hernia.  Moderate atherosclerotic changes are present.    There are no pelvic masses or adenopathy.  A diverticulum is seen at the hepatic flexure.  There is no free pelvic fluid.  The appendix is not inflamed.    At the lung bases, there is mild right basilar atelectatic change.                                       Medications   sodium chloride 0.9% bolus 1,000 mL 1,000 mL (0 mLs Intravenous Stopped 4/26/24 2048)   ketorolac injection 9.999 mg (9.999 mg Intravenous Given 4/26/24 1950)   ondansetron injection 4 mg (4 mg Intravenous Given 4/26/24 2202)   loperamide capsule 2 mg (2 mg Oral Given 4/26/24 2203)   HYDROcodone-acetaminophen 5-325 mg per tablet 1 tablet (1 tablet Oral Given  4/26/24 2204)     Medical Decision Making  back pain is a profoundly broad differential including benign back strains and spasms to serious structural issues such as cauda equina syndrome, prolapsed disc, epidural abscess or pathologic fractures even to back pain mimics like pyelonephritis, AAA or ischemic bowel.    Significantly broad differential including infectious enteritis or gastroenteritis, nephrolithiasis pyelonephritis or MSK pain.  Patient volunteers this may be a muscle strain.    Imaging is negative for stone, tolerating orals and feeling improved after administration of analgesics, IV fluids, antiemetics in the emergency department.  Will plan for symptomatic care, encouraged follow-up in the outpatient setting.    Problems Addressed:  Back pain, unspecified back location, unspecified back pain laterality, unspecified chronicity: chronic illness or injury with exacerbation, progression, or side effects of treatment  Diarrhea, unspecified type: acute illness or injury  Hypertension, unspecified type: chronic illness or injury  Nausea and vomiting, unspecified vomiting type: acute illness or injury    Amount and/or Complexity of Data Reviewed  External Data Reviewed: labs, radiology and notes.     Details: Previous evaluation and intervention for spine stimulator and spinal stenosis  Labs: ordered. Decision-making details documented in ED Course.  Radiology: ordered and independent interpretation performed. Decision-making details documented in ED Course.    Risk  OTC drugs.  Prescription drug management.  Parenteral controlled substances.                                      Clinical Impression:  Final diagnoses:  [M54.9] Back pain, unspecified back location, unspecified back pain laterality, unspecified chronicity (Primary)  [R11.2] Nausea and vomiting, unspecified vomiting type  [R19.7] Diarrhea, unspecified type  [I10] Hypertension, unspecified type          ED Disposition Condition    Discharge  Stable          ED Prescriptions       Medication Sig Dispense Start Date End Date Auth. Provider    HYDROcodone-acetaminophen (NORCO) 5-325 mg per tablet Take 1 tablet by mouth every 6 (six) hours as needed for Pain. 12 tablet 4/26/2024 -- Mike Vogt MD    diphenoxylate-atropine 2.5-0.025 mg (LOMOTIL) 2.5-0.025 mg per tablet Take 1 tablet by mouth 4 (four) times daily as needed for Diarrhea. 15 tablet 4/26/2024 5/6/2024 Mike Vogt MD    ondansetron (ZOFRAN-ODT) 4 MG TbDL Take 1 tablet (4 mg total) by mouth every 6 (six) hours as needed. 15 tablet 4/26/2024 -- Mike Vogt MD          Follow-up Information       Follow up With Specialties Details Why Contact Info    Hiwot Shah MD Family Medicine Call in 2 days If symptoms worsen, For a follow up visit about today [address] [facility]  [address]  [facility] [address]  [phone]               Mike Vogt MD  04/27/24 8057

## 2024-04-27 NOTE — ED TRIAGE NOTES
Patient presents to ED C/O  L flank pain 9/10 with an onset of 1 day with vomiting and diarrhea. Denies dysuria/hematuria. /87 all other VSS, safety measures in place, will continue to monitor.

## 2024-05-13 ENCOUNTER — PATIENT MESSAGE (OUTPATIENT)
Dept: PRIMARY CARE CLINIC | Facility: CLINIC | Age: 49
End: 2024-05-13
Payer: MEDICAID

## 2024-06-11 ENCOUNTER — OFFICE VISIT (OUTPATIENT)
Dept: PRIMARY CARE CLINIC | Facility: CLINIC | Age: 49
End: 2024-06-11
Payer: MEDICAID

## 2024-06-11 VITALS
OXYGEN SATURATION: 98 % | HEIGHT: 67 IN | BODY MASS INDEX: 44.43 KG/M2 | WEIGHT: 283.06 LBS | HEART RATE: 85 BPM | TEMPERATURE: 99 F | DIASTOLIC BLOOD PRESSURE: 70 MMHG | SYSTOLIC BLOOD PRESSURE: 130 MMHG

## 2024-06-11 DIAGNOSIS — I10 HTN (HYPERTENSION), BENIGN: ICD-10-CM

## 2024-06-11 DIAGNOSIS — E87.6 HYPOKALEMIA: ICD-10-CM

## 2024-06-11 DIAGNOSIS — Z01.810 PREOP CARDIOVASCULAR EXAM: Primary | ICD-10-CM

## 2024-06-11 DIAGNOSIS — E66.01 CLASS 3 SEVERE OBESITY DUE TO EXCESS CALORIES WITH SERIOUS COMORBIDITY AND BODY MASS INDEX (BMI) OF 40.0 TO 44.9 IN ADULT: ICD-10-CM

## 2024-06-11 PROCEDURE — 3078F DIAST BP <80 MM HG: CPT | Mod: CPTII,,, | Performed by: FAMILY MEDICINE

## 2024-06-11 PROCEDURE — 3075F SYST BP GE 130 - 139MM HG: CPT | Mod: CPTII,,, | Performed by: FAMILY MEDICINE

## 2024-06-11 PROCEDURE — 4010F ACE/ARB THERAPY RXD/TAKEN: CPT | Mod: CPTII,,, | Performed by: FAMILY MEDICINE

## 2024-06-11 PROCEDURE — 99214 OFFICE O/P EST MOD 30 MIN: CPT | Mod: S$PBB,,, | Performed by: FAMILY MEDICINE

## 2024-06-11 PROCEDURE — 1159F MED LIST DOCD IN RCRD: CPT | Mod: CPTII,,, | Performed by: FAMILY MEDICINE

## 2024-06-11 PROCEDURE — 99999 PR PBB SHADOW E&M-EST. PATIENT-LVL III: CPT | Mod: PBBFAC,,, | Performed by: FAMILY MEDICINE

## 2024-06-11 PROCEDURE — 99213 OFFICE O/P EST LOW 20 MIN: CPT | Mod: PBBFAC,PN | Performed by: FAMILY MEDICINE

## 2024-06-11 PROCEDURE — 1160F RVW MEDS BY RX/DR IN RCRD: CPT | Mod: CPTII,,, | Performed by: FAMILY MEDICINE

## 2024-06-11 PROCEDURE — 3008F BODY MASS INDEX DOCD: CPT | Mod: CPTII,,, | Performed by: FAMILY MEDICINE

## 2024-06-11 RX ORDER — ONDANSETRON 4 MG/1
4 TABLET, FILM COATED ORAL EVERY 8 HOURS PRN
COMMUNITY
Start: 2024-04-02 | End: 2024-06-11

## 2024-06-11 RX ORDER — OXYCODONE AND ACETAMINOPHEN 5; 325 MG/1; MG/1
TABLET ORAL
COMMUNITY
Start: 2024-04-01 | End: 2024-06-11

## 2024-06-11 RX ORDER — SPIRONOLACTONE 25 MG/1
25 TABLET ORAL DAILY
Qty: 90 TABLET | Refills: 2 | Status: SHIPPED | OUTPATIENT
Start: 2024-06-11 | End: 2025-03-08

## 2024-06-11 RX ORDER — SCOLOPAMINE TRANSDERMAL SYSTEM 1 MG/1
PATCH, EXTENDED RELEASE TRANSDERMAL
COMMUNITY
Start: 2024-06-09

## 2024-06-11 NOTE — ASSESSMENT & PLAN NOTE
Gastric bypass by Dr Leo Klein  , [facility]    Denies any current chest pain shortness of breath, no current illness, no reason not to have surgery.  No history of clots. No problems with anesthesia in the past  .  Blood pressure and vital signs normal today    We reviewed testing   EKG normal sinus rhythm  Chest x-ray clear  CBC CMP normal - except K low (but didn't take K supplement the day prior to labs)    STOP ALL NSAIDS ( like Advil (Ibuprofen)  , Alleve (naproxen) , Mobic (meloxicam), Celebrex, Aspirin, Plavix, Omega Fatty Acids/ Fish Oil )  One week prior to surgery so you don't bleed excessively    No problems with recent anesthesia & surgery for spinal cord stimulator removal 5/1/24    She is cardiovascularly cleared for anesthesia & surgery

## 2024-06-11 NOTE — PROGRESS NOTES
Assessment:     1. Preop cardiovascular exam    2. HTN (hypertension), benign    3. Hypokalemia    4. Class 3 severe obesity due to excess calories with serious comorbidity and body mass index (BMI) of 40.0 to 44.9 in adult      Plan:     Hypokalemia  This may be due to HCTZ 25 for HTN & swelling, but she is also taking Potassium 15 , two daily - but the day before labs drawn 6/7,  by Dr Klein, K 3.5 she ran out of Potassium med. She is back to taking it daily now.     STOP HCTZ 25 & Potassium    Start Spironlactone 25 mg daily    HTN (hypertension), benign  Stable, continue losartan 100 daily     STOP HCTZ due to low K    START Spironlactone 25 mg daily    Preop cardiovascular exam  Gastric bypass by Dr Leo Klein  , [facility]    Denies any current chest pain shortness of breath, no current illness, no reason not to have surgery.  No history of clots. No problems with anesthesia in the past  .  Blood pressure and vital signs normal today    We reviewed testing   EKG normal sinus rhythm  Chest x-ray clear  CBC CMP normal - except K low (but didn't take K supplement the day prior to labs)    STOP ALL NSAIDS ( like Advil (Ibuprofen)  , Alleve (naproxen) , Mobic (meloxicam), Celebrex, Aspirin, Plavix, Omega Fatty Acids/ Fish Oil )  One week prior to surgery so you don't bleed excessively    No problems with recent anesthesia & surgery for spinal cord stimulator removal 5/1/24    She is cardiovascularly cleared for anesthesia & surgery    Class 3 severe obesity due to excess calories with serious comorbidity and body mass index (BMI) of 40.0 to 44.9 in adult  Planning gastric bypass          HPI: Rama Blair is a 48 y.o. female with is here today fo preop exam     Denies chest pain, shortness of breath    Current Outpatient Medications   Medication Instructions    albuterol (PROVENTIL/VENTOLIN HFA) 90 mcg/actuation inhaler INHALE 1 PUFFS BY MOUTH INTO THE LUNGS EVERY 6 HOURS AS  NEEDED FOR WHEEZING. RESCUE    blood pressure monitor (IHEALTH EASE) XL arm size (OP) Other, As needed (PRN)    ezetimibe (ZETIA) 10 mg, Oral, Daily    losartan (COZAAR) 100 mg, Oral    pantoprazole (PROTONIX) 40 MG tablet TAKE 1 TABLET(40 MG) BY MOUTH EVERY DAY    rosuvastatin (CRESTOR) 40 mg, Oral, Nightly    scopolamine (TRANSDERM-SCOP) 1.3-1.5 mg (1 mg over 3 days) SMARTSIG:Topical    spironolactone (ALDACTONE) 25 mg, Oral, Daily    traZODone (DESYREL) 50 mg, Oral, Nightly PRN         Past Medical History:   Diagnosis Date    Anemia, unspecified     Constipation     GI Dr Reece 2014    COVID-19     Elevated platelet count 03/11/2021    Refer hematol 3/2021    Familial hypercholesteremia     per digital lipid    Gastritis 07/30/2021 2021    Gastroesophageal reflux disease without esophagitis 08/28/2018    She states she had EGD in past    Grief 09/19/2017    mom passed 2017    HTN (hypertension), benign 03/10/2015    Digital med 12/20    Intestinal erosis 09/17/2021    CS 2021    Iron deficiency anemia secondary to inadequate dietary iron intake 08/26/2021    Start OTC 8/21    Knee pain, left 2012    scope, Dr Reyes, injury at work, treadmill helps with stiffness    Low blood potassium 07/30/2021    avoid HCTZ    Mixed hyperlipidemia 08/29/2013    Morbid obesity 02/23/2015    bariatrics not covered by insurance    SOB (shortness of breath)     Spinal cord stimulator status 08/28/2018    In back for L knee pain, Dr Kessler surgery & pain mgmt, Lyrica or Hydrocodone at night    Thyroid disease      Past Surgical History:   Procedure Laterality Date    COLONOSCOPY N/A 9/17/2021    Procedure: COLONOSCOPY;  Surgeon: Keith Mckeon MD;  Location: [facility] ([address] [place] [address]);  Service: Endoscopy;  Laterality: N/A;  prep ins. emailed / COVID screening [facility] [place] [facility] 9/14/21- ERW    DILATION AND CURETTAGE OF UTERUS      ESOPHAGOGASTRODUODENOSCOPY N/A 7/27/2021    Procedure: ESOPHAGOGASTRODUODENOSCOPY (EGD);  Surgeon: Keith SIDDIQUI  "MD Mike;  Location: [facility] ENDO (4TH FLR);  Service: Endoscopy;  Laterality: N/A;  COVID test 7/24 [facility]-    HYSTEROSCOPY WITH DILATION AND CURETTAGE OF UTERUS N/A 9/21/2022    Procedure: HYSTEROSCOPY, WITH DILATION AND CURETTAGE OF UTERUS;  Surgeon: Kim Juarez MD;  Location: [facility];  Service: OB/GYN;  Laterality: N/A;    KNEE ARTHROSCOPY      L, Dasa    KNEE SURGERY      SKIN GRAFT      left finger due to injury    SPINAL CORD STIMULATOR IMPLANT      to L knee, Dr Kessler    TUBAL LIGATION       Vitals:    06/11/24 1402   BP: 130/70   Pulse: 85   Temp: 99 °F (37.2 °C)   TempSrc: Oral   SpO2: 98%   Weight: 128.4 kg (283 lb 1.1 oz)   Height: 5' 7" (1.702 m)   PainSc: 0-No pain     Wt Readings from Last 5 Encounters:   06/11/24 128.4 kg (283 lb 1.1 oz)   04/26/24 124.3 kg (274 lb)   02/15/24 128.9 kg (284 lb 2.8 oz)   11/22/23 123.8 kg (273 lb)   11/08/23 126.9 kg (279 lb 12.2 oz)     Objective:   Physical Exam  Constitutional:       Appearance: She is well-developed.   Eyes:      Pupils: Pupils are equal, round, and reactive to light.   Cardiovascular:      Rate and Rhythm: Normal rate and regular rhythm.      Heart sounds: Normal heart sounds. No murmur heard.  Pulmonary:      Effort: Pulmonary effort is normal.      Breath sounds: Normal breath sounds. No wheezing.   Abdominal:      General: Bowel sounds are normal. There is no distension.      Palpations: Abdomen is soft. There is no mass.      Tenderness: There is no abdominal tenderness. There is no guarding or rebound.   Musculoskeletal:      Cervical back: Neck supple.   Skin:     General: Skin is warm and dry.   Neurological:      Mental Status: She is alert.   Psychiatric:         Behavior: Behavior normal.                                     "

## 2024-06-11 NOTE — ASSESSMENT & PLAN NOTE
This may be due to HCTZ 25 for HTN & swelling, but she is also taking Potassium 15 , two daily - but the day before labs drawn 6/7,  by Dr Klein, K 3.5 she ran out of Potassium med. She is back to taking it daily now.     STOP HCTZ 25 & Potassium    Start Spironlactone 25 mg daily

## 2024-07-01 ENCOUNTER — PATIENT MESSAGE (OUTPATIENT)
Dept: ADMINISTRATIVE | Facility: OTHER | Age: 49
End: 2024-07-01
Payer: MEDICAID

## 2024-07-18 ENCOUNTER — PATIENT MESSAGE (OUTPATIENT)
Dept: PRIMARY CARE CLINIC | Facility: CLINIC | Age: 49
End: 2024-07-18
Payer: MEDICAID

## 2024-08-07 ENCOUNTER — TELEPHONE (OUTPATIENT)
Dept: PRIMARY CARE CLINIC | Facility: CLINIC | Age: 49
End: 2024-08-07
Payer: MEDICAID

## 2024-08-09 ENCOUNTER — OFFICE VISIT (OUTPATIENT)
Dept: PRIMARY CARE CLINIC | Facility: CLINIC | Age: 49
End: 2024-08-09
Payer: MEDICAID

## 2024-08-09 ENCOUNTER — HOSPITAL ENCOUNTER (OUTPATIENT)
Dept: RADIOLOGY | Facility: HOSPITAL | Age: 49
Discharge: HOME | End: 2024-08-09
Attending: STUDENT IN AN ORGANIZED HEALTH CARE EDUCATION/TRAINING PROGRAM
Payer: MEDICAID

## 2024-08-09 VITALS
OXYGEN SATURATION: 99 % | DIASTOLIC BLOOD PRESSURE: 66 MMHG | HEIGHT: 67 IN | BODY MASS INDEX: 40.42 KG/M2 | HEART RATE: 97 BPM | WEIGHT: 257.5 LBS | SYSTOLIC BLOOD PRESSURE: 104 MMHG

## 2024-08-09 DIAGNOSIS — E78.2 MIXED HYPERLIPIDEMIA: ICD-10-CM

## 2024-08-09 DIAGNOSIS — R11.10 VOMITING, UNSPECIFIED VOMITING TYPE, UNSPECIFIED WHETHER NAUSEA PRESENT: ICD-10-CM

## 2024-08-09 DIAGNOSIS — E66.01 CLASS 3 SEVERE OBESITY DUE TO EXCESS CALORIES WITH SERIOUS COMORBIDITY AND BODY MASS INDEX (BMI) OF 40.0 TO 44.9 IN ADULT: ICD-10-CM

## 2024-08-09 DIAGNOSIS — R10.9 ABDOMINAL PAIN, UNSPECIFIED ABDOMINAL LOCATION: ICD-10-CM

## 2024-08-09 DIAGNOSIS — Z98.84 HISTORY OF ROUX-EN-Y GASTRIC BYPASS: Primary | ICD-10-CM

## 2024-08-09 DIAGNOSIS — I10 HTN (HYPERTENSION), BENIGN: ICD-10-CM

## 2024-08-09 PROCEDURE — 3074F SYST BP LT 130 MM HG: CPT | Mod: CPTII,,, | Performed by: STUDENT IN AN ORGANIZED HEALTH CARE EDUCATION/TRAINING PROGRAM

## 2024-08-09 PROCEDURE — 4010F ACE/ARB THERAPY RXD/TAKEN: CPT | Mod: CPTII,,, | Performed by: STUDENT IN AN ORGANIZED HEALTH CARE EDUCATION/TRAINING PROGRAM

## 2024-08-09 PROCEDURE — 74019 RADEX ABDOMEN 2 VIEWS: CPT | Mod: TC,PN

## 2024-08-09 PROCEDURE — 99213 OFFICE O/P EST LOW 20 MIN: CPT | Mod: S$PBB,,, | Performed by: STUDENT IN AN ORGANIZED HEALTH CARE EDUCATION/TRAINING PROGRAM

## 2024-08-09 PROCEDURE — 74019 RADEX ABDOMEN 2 VIEWS: CPT | Mod: 26,,, | Performed by: RADIOLOGY

## 2024-08-09 PROCEDURE — 3078F DIAST BP <80 MM HG: CPT | Mod: CPTII,,, | Performed by: STUDENT IN AN ORGANIZED HEALTH CARE EDUCATION/TRAINING PROGRAM

## 2024-08-09 PROCEDURE — 3008F BODY MASS INDEX DOCD: CPT | Mod: CPTII,,, | Performed by: STUDENT IN AN ORGANIZED HEALTH CARE EDUCATION/TRAINING PROGRAM

## 2024-08-09 PROCEDURE — 99214 OFFICE O/P EST MOD 30 MIN: CPT | Mod: PBBFAC,25,PN | Performed by: STUDENT IN AN ORGANIZED HEALTH CARE EDUCATION/TRAINING PROGRAM

## 2024-08-09 PROCEDURE — 1159F MED LIST DOCD IN RCRD: CPT | Mod: CPTII,,, | Performed by: STUDENT IN AN ORGANIZED HEALTH CARE EDUCATION/TRAINING PROGRAM

## 2024-08-09 PROCEDURE — 99999 PR PBB SHADOW E&M-EST. PATIENT-LVL IV: CPT | Mod: PBBFAC,,, | Performed by: STUDENT IN AN ORGANIZED HEALTH CARE EDUCATION/TRAINING PROGRAM

## 2024-08-09 PROCEDURE — 1160F RVW MEDS BY RX/DR IN RCRD: CPT | Mod: CPTII,,, | Performed by: STUDENT IN AN ORGANIZED HEALTH CARE EDUCATION/TRAINING PROGRAM

## 2024-08-12 DIAGNOSIS — E87.6 HYPOKALEMIA: Primary | ICD-10-CM

## 2024-08-12 RX ORDER — POTASSIUM CHLORIDE 20 MEQ/1
20 TABLET, EXTENDED RELEASE ORAL DAILY
Qty: 30 TABLET | Refills: 0 | Status: SHIPPED | OUTPATIENT
Start: 2024-08-12

## 2024-09-02 ENCOUNTER — HOSPITAL ENCOUNTER (EMERGENCY)
Facility: OTHER | Age: 49
Discharge: HOME | End: 2024-09-02
Attending: EMERGENCY MEDICINE
Payer: MEDICAID

## 2024-09-02 VITALS
RESPIRATION RATE: 16 BRPM | HEIGHT: 67 IN | DIASTOLIC BLOOD PRESSURE: 76 MMHG | TEMPERATURE: 98 F | BODY MASS INDEX: 37.67 KG/M2 | WEIGHT: 240 LBS | SYSTOLIC BLOOD PRESSURE: 160 MMHG | OXYGEN SATURATION: 100 % | HEART RATE: 83 BPM

## 2024-09-02 DIAGNOSIS — M54.50 LOW BACK PAIN, UNSPECIFIED BACK PAIN LATERALITY, UNSPECIFIED CHRONICITY, UNSPECIFIED WHETHER SCIATICA PRESENT: Primary | ICD-10-CM

## 2024-09-02 DIAGNOSIS — R51.9 NONINTRACTABLE HEADACHE, UNSPECIFIED CHRONICITY PATTERN, UNSPECIFIED HEADACHE TYPE: ICD-10-CM

## 2024-09-02 DIAGNOSIS — R05.9 COUGH: ICD-10-CM

## 2024-09-02 DIAGNOSIS — J06.9 UPPER RESPIRATORY TRACT INFECTION, UNSPECIFIED TYPE: ICD-10-CM

## 2024-09-02 DIAGNOSIS — R07.9 CHEST PAIN: ICD-10-CM

## 2024-09-02 PROCEDURE — 96372 THER/PROPH/DIAG INJ SC/IM: CPT | Performed by: EMERGENCY MEDICINE

## 2024-09-02 PROCEDURE — 99284 EMERGENCY DEPT VISIT MOD MDM: CPT | Mod: 25

## 2024-09-02 PROCEDURE — 93005 ELECTROCARDIOGRAM TRACING: CPT

## 2024-09-02 PROCEDURE — 93010 ELECTROCARDIOGRAM REPORT: CPT | Mod: ,,, | Performed by: INTERNAL MEDICINE

## 2024-09-02 PROCEDURE — 63600175 PHARM REV CODE 636 W HCPCS: Performed by: EMERGENCY MEDICINE

## 2024-09-02 PROCEDURE — 87635 SARS-COV-2 COVID-19 AMP PRB: CPT | Performed by: EMERGENCY MEDICINE

## 2024-09-02 RX ORDER — BUTALBITAL, ACETAMINOPHEN AND CAFFEINE 50; 325; 40 MG/1; MG/1; MG/1
1 TABLET ORAL EVERY 4 HOURS PRN
Qty: 15 TABLET | Refills: 0 | Status: SHIPPED | OUTPATIENT
Start: 2024-09-02 | End: 2024-10-02

## 2024-09-02 RX ORDER — LIDOCAINE 50 MG/G
1 PATCH TOPICAL DAILY
Qty: 30 PATCH | Refills: 0 | Status: SHIPPED | OUTPATIENT
Start: 2024-09-02

## 2024-09-02 RX ORDER — MOMETASONE FUROATE MONOHYDRATE 50 UG/1
2 SPRAY, METERED NASAL DAILY
Qty: 17 G | Refills: 0 | Status: SHIPPED | OUTPATIENT
Start: 2024-09-02

## 2024-09-02 RX ORDER — KETOROLAC TROMETHAMINE 30 MG/ML
10 INJECTION, SOLUTION INTRAMUSCULAR; INTRAVENOUS
Status: COMPLETED | OUTPATIENT
Start: 2024-09-02 | End: 2024-09-02

## 2024-09-02 RX ADMIN — KETOROLAC TROMETHAMINE 10 MG: 30 INJECTION, SOLUTION INTRAMUSCULAR; INTRAVENOUS at 12:09

## 2024-09-02 NOTE — ED PROVIDER NOTES
Encounter Date: 9/2/2024    SCRIBE #1 NOTE: I, Navi Natarajan, am scribing for, and in the presence of,  Alisia Coker MD. I have scribed the entire note.       History     Chief Complaint   Patient presents with    Cough     Pt reports cold symptoms with cough, headache and mid sternal CP. She denies nausea or vomiting no trauma or neuro deficits noted. Pain is non radiating a reproducible upon palpation.     Time seen by provider: 11:13 AM    This is a 48 y.o. female who presents with complaint of  URI s/s including headaches. She reports associated fatigue cough, and congestion with this began two weeks ago. Her symptoms are somewhat alleviated with Tylenol and Robitussin. Patient also reports diarrhea for the past two days. She denies any fever or chills. While she does not report any sick contact at home, she does work at [employer]. PMHx of HTN with recent cessation of her antihypertensives due to a gastric bypass surgery. She reports systolic pressures as high as 200. Known reaction to NSAID's, which cause reflux symptoms. This is the extent of the patient's complaints at this time.    The history is provided by the patient.     Review of patient's allergies indicates:   Allergen Reactions    Nsaids (non-steroidal anti-inflammatory drug) Other (See Comments)     Stomach issues     Past Medical History:   Diagnosis Date    Anemia, unspecified     Constipation     GI Dr Reece 2014    COVID-19     Elevated platelet count 03/11/2021    Refer hematol 3/2021    Familial hypercholesteremia     per digital lipid    Gastritis 07/30/2021 2021    Gastroesophageal reflux disease without esophagitis 08/28/2018    She states she had EGD in past    Grief 09/19/2017    mom passed 2017    HTN (hypertension), benign 03/10/2015    Digital med 12/20    Intestinal erosis 09/17/2021    CS 2021    Iron deficiency anemia secondary to inadequate dietary iron intake 08/26/2021    Start OTC 8/21    Knee pain, left 2012    scope, Dr Reyes,  injury at work, treadmill helps with stiffness    Low blood potassium 07/30/2021    avoid HCTZ    Mixed hyperlipidemia 08/29/2013    Morbid obesity 02/23/2015    bariatrics not covered by insurance    SOB (shortness of breath)     Spinal cord stimulator status 08/28/2018    In back for L knee pain, Dr Kessler surgery & pain mgmt, Lyrica or Hydrocodone at night    Thyroid disease      Past Surgical History:   Procedure Laterality Date    COLONOSCOPY N/A 09/17/2021    Procedure: COLONOSCOPY;  Surgeon: Keith Mckeon MD;  Location: [facility] (4TH FLR);  Service: Endoscopy;  Laterality: N/A;  prep ins. emailed / [place] [facility] [place] [facility]    DILATION AND CURETTAGE OF UTERUS      ESOPHAGOGASTRODUODENOSCOPY N/A 07/27/2021    Procedure: ESOPHAGOGASTRODUODENOSCOPY (EGD);  Surgeon: Keith Mckeon MD;  Location: [facility] (4TH FLR);  Service: Endoscopy;  Laterality: N/A;  COVID test 7/24 [facility]-    GASTRIC BYPASS N/A     HYSTEROSCOPY WITH DILATION AND CURETTAGE OF UTERUS N/A 09/21/2022    Procedure: HYSTEROSCOPY, WITH DILATION AND CURETTAGE OF UTERUS;  Surgeon: Kim Juarez MD;  Location: [facility];  Service: OB/GYN;  Laterality: N/A;    KNEE ARTHROSCOPY      L, Dasa    KNEE SURGERY      SKIN GRAFT      left finger due to injury    SPINAL CORD STIMULATOR IMPLANT      to L knee, Dr Kessler    TUBAL LIGATION       Family History   Problem Relation Name Age of Onset    Hypertension Mother      Diabetes Mother      COPD Mother      Breast cancer Neg Hx      Colon cancer Neg Hx      Ovarian cancer Neg Hx       Social History     Tobacco Use    Smoking status: Never    Smokeless tobacco: Never   Substance Use Topics    Alcohol use: No    Drug use: No     Review of Systems   Constitutional:  Positive for fatigue. Negative for fever.   HENT:  Positive for congestion. Negative for sore throat.    Respiratory:  Positive for cough. Negative for shortness of breath.    Cardiovascular:  Negative for chest pain.    Gastrointestinal:  Positive for diarrhea. Negative for nausea.   Genitourinary:  Negative for dysuria.   Musculoskeletal:  Negative for back pain.   Skin:  Negative for rash.   Neurological:  Positive for headaches. Negative for weakness.   Hematological:  Does not bruise/bleed easily.       Physical Exam     Initial Vitals [09/02/24 1056]   BP Pulse Resp Temp SpO2   139/74 90 18 98 °F (36.7 °C) 99 %      MAP       --         Physical Exam    Nursing note and vitals reviewed.  Constitutional: She appears well-developed and well-nourished. She is not diaphoretic. She is Obese .  Non-toxic appearance. She appears ill. No distress.   HENT:   Head: Normocephalic and atraumatic.   Nasal congestion.  No meningeal signs    Eyes: Conjunctivae are normal. No scleral icterus.   Coryza bilaterally.    Neck: Neck supple. No JVD present.   Normal range of motion.  Cardiovascular:  Normal rate, regular rhythm and normal heart sounds.           No murmur heard.  Pulmonary/Chest: Breath sounds normal. No tachypnea. No respiratory distress. She has no wheezes.   Reproducible chest wall tenderness.    Musculoskeletal:         General: Normal range of motion.      Cervical back: Normal range of motion and neck supple.     Neurological: She is alert and oriented to person, place, and time.   Skin: Skin is warm and dry.   Psychiatric: She has a normal mood and affect. Her behavior is normal.         ED Course   Procedures  Labs Reviewed   POCT INFLUENZA A/B MOLECULAR       Result Value    POC Molecular Influenza A Ag Negative      POC Molecular Influenza B Ag Negative       Acceptable Yes     SARS-COV-2 RDRP GENE    POC Rapid COVID Negative       Acceptable Yes          ECG Results              EKG 12-lead (Final result)        Collection Time Result Time QRS Duration OHS QTC Calculation    09/02/24 10:50:29 09/05/24 08:06:34 72 457                     Final result by Interface, Lab In [facility] (09/05/24  08:06:38)                   Narrative:    Test Reason : R07.9,    Vent. Rate : 095 BPM     Atrial Rate : 095 BPM     P-R Int : 146 ms          QRS Dur : 072 ms      QT Int : 364 ms       P-R-T Axes : 036 021 013 degrees     QTc Int : 457 ms    Normal sinus rhythm  Cannot rule out Anterior infarct ,age undetermined  Abnormal ECG    Confirmed by Ravinder Roy MD ([id number]) on 9/5/2024 8:06:32 AM    Referred By: [email or web address]   SELF           Confirmed By:Ravinder Roy MD                                  Imaging Results              X-Ray Chest PA And Lateral (Final result)  Result time 09/02/24 12:05:46      Final result by Anson Moses MD (09/02/24 12:05:46)                   Impression:      1. No acute cardiopulmonary process.      Electronically signed by: Anson Moses MD  Date:    09/02/2024  Time:    12:05               Narrative:    EXAMINATION:  XR CHEST PA AND LATERAL    CLINICAL HISTORY:  Cough, unspecified    TECHNIQUE:  PA and lateral views of the chest were performed.    COMPARISON:  11/22/2023    FINDINGS:  The cardiomediastinal silhouette is not enlarged noting calcification of the aorta..  There is no pleural effusion.  The trachea is midline.  The lungs are symmetrically expanded bilaterally with mild left basilar subsegmental atelectasis..  No large focal consolidation seen.  There is no pneumothorax.  The osseous structures are remarkable for degenerative change..                                       Medications   ketorolac injection 9.999 mg (9.999 mg Intramuscular Given 9/2/24 1207)     Medical Decision Making  Initial impression: This is a 48 year old female who presents with upper respiratory congstin, fatigue, diarrhea, headaches. Plan flu and Void testing, EKG, and chest x-ray.     Differential diagnosis: Viral upper respiratory infection, sinusitis, allergic rhinitis, bronchitis, influenza, pneumonia, dental infection, pharyngitis.    Amount and/or Complexity of Data  Reviewed  Labs: ordered. Decision-making details documented in ED Course.  Radiology: ordered and independent interpretation performed.     Details: See interpretation above.   ECG/medicine tests: ordered and independent interpretation performed.     Details: See interpretation above.     Risk  Prescription drug management.            Scribe Attestation:   Scribe #1: I performed the above scribed service and the documentation accurately describes the services I performed. I attest to the accuracy of the note.    Physician Attestation for Scribe: I, Alisia Coker MD, reviewed documentation as scribed in my presence, which is both accurate and complete.        ED Course as of 09/09/24 0656   Mon Sep 02, 2024   1259 Patient feeling better and headache is subsiding. Discussed reasons to return and importance of followup.  Patient understands that the emergency visit today is primarily to address immediate concerns and to rule out emergent cause of symptoms and that they may require further workup and evaluation as an outpatient. All questions addressed and patient given discharge instructions and followup information.  [GS]      ED Course User Index  [GS] Navi Natarajan                           Clinical Impression:  Final diagnoses:  [R07.9] Chest pain  [R05.9] Cough  [M54.50] Low back pain, unspecified back pain laterality, unspecified chronicity, unspecified whether sciatica present (Primary)  [J06.9] Upper respiratory tract infection, unspecified type  [R51.9] Nonintractable headache, unspecified chronicity pattern, unspecified headache type          ED Disposition Condition    Discharge Stable          ED Prescriptions       Medication Sig Dispense Start Date End Date Auth. Provider    butalbital-acetaminophen-caffeine -40 mg (FIORICET, ESGIC) -40 mg per tablet Take 1 tablet by mouth every 4 (four) hours as needed for Pain or Headaches. 15 tablet 9/2/2024 10/2/2024 Alisia Coker MD    LIDOcaine  (LIDODERM) 5 % Place 1 patch onto the skin once daily. Remove & Discard patch within 12 hours or as directed by MD 30 patch 9/2/2024 -- Alisia Coker MD    mometasone (NASONEX) 50 mcg/actuation nasal spray 2 sprays by Nasal route once daily. 17 g 9/2/2024 -- Alisia Coker MD          Follow-up Information       Follow up With Specialties Details Why Contact Info    Hiwot Shah MD Family Medicine Schedule an appointment as soon as possible for a visit   [address]  [address]  [facility] [address]  [phone]               Alisia Coker MD  09/09/24 0656

## 2024-09-02 NOTE — ED NOTES
C.o nonproductive cough, HA, and mid lower back pain. Reports she has been feeling sick for over a week. No fever/chills, no CP or SOB. Aside from pain voices no other complaints at this time.

## 2024-09-05 ENCOUNTER — TELEPHONE (OUTPATIENT)
Dept: PRIMARY CARE CLINIC | Facility: CLINIC | Age: 49
End: 2024-09-05
Payer: MEDICAID

## 2024-09-05 ENCOUNTER — PATIENT MESSAGE (OUTPATIENT)
Dept: PRIMARY CARE CLINIC | Facility: CLINIC | Age: 49
End: 2024-09-05
Payer: MEDICAID

## 2024-09-05 LAB
OHS QRS DURATION: 72 MS
OHS QTC CALCULATION: 457 MS

## 2024-09-05 RX ORDER — PROMETHAZINE HYDROCHLORIDE AND DEXTROMETHORPHAN HYDROBROMIDE 6.25; 15 MG/5ML; MG/5ML
5 SYRUP ORAL EVERY 4 HOURS PRN
Qty: 180 ML | Refills: 0 | Status: SHIPPED | OUTPATIENT
Start: 2024-09-05 | End: 2024-09-15

## 2024-09-05 NOTE — TELEPHONE ENCOUNTER
Pt seen in ED on 9/2/24 for Cough . Insurance did not approve the mometasone (NASONEX) 50 mcg/actuation nasal spray prescribed in the ED She is requesting Rx for cough meds called to the pharmacy. Do she need to schedule a appt?

## 2024-09-05 NOTE — TELEPHONE ENCOUNTER
----- Message from Karuna Aldridge sent at 9/5/2024  1:20 PM CDT -----  Contact: [phone]  [email or web address]     Patient is calling for Medical Advice regarding:went to ED for a cold     How long has patient had these symptoms:    Pharmacy name and phone#:  [facility] #[id number] - [address] [address]  Phone: [phone] Fax: [phone]       Patient wants a call back or thru [facility]:call back     Comments:  Pt states they did not give her anything for the cough and is asking if the dr can call something in for her   Please advise patient replies from provider may take up to 48 hours.

## 2024-09-05 NOTE — TELEPHONE ENCOUNTER
Pt informed previous message was sent to provider for review. She will be notified once a response is given.

## 2024-09-16 ENCOUNTER — ON-DEMAND VIRTUAL (OUTPATIENT)
Dept: URGENT CARE | Facility: CLINIC | Age: 49
End: 2024-09-16
Payer: MEDICAID

## 2024-09-16 DIAGNOSIS — R30.0 DYSURIA: Primary | ICD-10-CM

## 2024-09-16 PROCEDURE — 99213 OFFICE O/P EST LOW 20 MIN: CPT | Mod: 95,,, | Performed by: NURSE PRACTITIONER

## 2024-09-16 RX ORDER — PHENAZOPYRIDINE HYDROCHLORIDE 100 MG/1
100 TABLET, FILM COATED ORAL 3 TIMES DAILY PRN
Qty: 6 TABLET | Refills: 0 | Status: SHIPPED | OUTPATIENT
Start: 2024-09-16 | End: 2024-09-18

## 2024-09-16 RX ORDER — SUCRALFATE 1 G/10ML
SUSPENSION ORAL
COMMUNITY
Start: 2024-08-11

## 2024-09-16 RX ORDER — NITROFURANTOIN 25; 75 MG/1; MG/1
100 CAPSULE ORAL EVERY 12 HOURS
Qty: 14 CAPSULE | Refills: 0 | Status: SHIPPED | OUTPATIENT
Start: 2024-09-16 | End: 2024-09-23

## 2024-09-16 NOTE — PROGRESS NOTES
Subjective:      Patient ID: Rama Moy is a 48 y.o. female.    Vitals:  vitals were not taken for this visit.     Chief Complaint: Urinary Tract Infection      Visit Type: TELE AUDIOVISUAL    Present with the patient at the time of consultation: TELEMED PRESENT WITH PATIENT: None, at home    Past Medical History:   Diagnosis Date    Anemia, unspecified     Constipation     GI Dr Reece 2014    COVID-19     Elevated platelet count 03/11/2021    Refer hematol 3/2021    Familial hypercholesteremia     per digital lipid    Gastritis 07/30/2021 2021    Gastroesophageal reflux disease without esophagitis 08/28/2018    She states she had EGD in past    Grief 09/19/2017    mom passed 2017    HTN (hypertension), benign 03/10/2015    Digital med 12/20    Intestinal erosis 09/17/2021    CS 2021    Iron deficiency anemia secondary to inadequate dietary iron intake 08/26/2021    Start OTC 8/21    Knee pain, left 2012    scope, Dr Reyes, injury at work, treadmill helps with stiffness    Low blood potassium 07/30/2021    avoid HCTZ    Mixed hyperlipidemia 08/29/2013    Morbid obesity 02/23/2015    bariatrics not covered by insurance    SOB (shortness of breath)     Spinal cord stimulator status 08/28/2018    In back for L knee pain, Dr Kessler surgery & pain mgmt, Lyrica or Hydrocodone at night    Thyroid disease      Past Surgical History:   Procedure Laterality Date    COLONOSCOPY N/A 09/17/2021    Procedure: COLONOSCOPY;  Surgeon: Keith Mckeon MD;  Location: [facility] (4TH FLR);  Service: Endoscopy;  Laterality: N/A;  prep ins. emailed / COVID screening [facility] 2nd floor 9/14/21- ERW    DILATION AND CURETTAGE OF UTERUS      ESOPHAGOGASTRODUODENOSCOPY N/A 07/27/2021    Procedure: ESOPHAGOGASTRODUODENOSCOPY (EGD);  Surgeon: Keith Mckeon MD;  Location: [facility] (4TH FLR);  Service: Endoscopy;  Laterality: N/A;  COVID test 7/24 [facility]-    GASTRIC BYPASS N/A     HYSTEROSCOPY WITH DILATION AND CURETTAGE OF UTERUS N/A  2022    Procedure: HYSTEROSCOPY, WITH DILATION AND CURETTAGE OF UTERUS;  Surgeon: Kim Juarez MD;  Location: [facility];  Service: OB/GYN;  Laterality: N/A;    KNEE ARTHROSCOPY      L, Dasa    KNEE SURGERY      SKIN GRAFT      left finger due to injury    SPINAL CORD STIMULATOR IMPLANT      to L knee, Dr Kessler    TUBAL LIGATION       Review of patient's allergies indicates:   Allergen Reactions    Nsaids (non-steroidal anti-inflammatory drug) Other (See Comments)     Stomach issues     Current Outpatient Medications on File Prior to Visit   Medication Sig Dispense Refill    sucralfate (CARAFATE) 100 mg/mL suspension Take by mouth.      albuterol (PROVENTIL/VENTOLIN HFA) 90 mcg/actuation inhaler INHALE 1 PUFFS BY MOUTH INTO THE LUNGS EVERY 6 HOURS AS NEEDED FOR WHEEZING. RESCUE 54 g 2    blood pressure monitor (IHEALLIANAI EASE) XL arm size (OP) by Other route as needed for High Blood Pressure. 1 each 0    butalbital-acetaminophen-caffeine -40 mg (FIORICET, ESGIC) -40 mg per tablet Take 1 tablet by mouth every 4 (four) hours as needed for Pain or Headaches. 15 tablet 0    ezetimibe (ZETIA) 10 mg tablet Take 1 tablet (10 mg total) by mouth once daily. (Patient not taking: Reported on 2024) 90 tablet 3    LIDOcaine (LIDODERM) 5 % Place 1 patch onto the skin once daily. Remove & Discard patch within 12 hours or as directed by MD 30 patch 0    losartan (COZAAR) 100 MG tablet TAKE 1 TABLET(100 MG) BY MOUTH EVERY DAY 90 tablet 0    mometasone (NASONEX) 50 mcg/actuation nasal spray 2 sprays by Nasal route once daily. 17 g 0    pantoprazole (PROTONIX) 40 MG tablet TAKE 1 TABLET(40 MG) BY MOUTH EVERY DAY (Patient not taking: Reported on 2024) 90 tablet 0    potassium chloride SA (K-DUR,KLOR-CON) 20 MEQ tablet Take 1 tablet (20 mEq total) by mouth once daily. 30 tablet 0    [] promethazine-dextromethorphan (PROMETHAZINE-DM) 6.25-15 mg/5 mL Syrp Take 5 mLs by mouth every 4 (four) hours as needed  (cough). Do not drive 180 mL 0    rosuvastatin (CRESTOR) 40 MG Tab TAKE 1 TABLET(40 MG) BY MOUTH EVERY EVENING 90 tablet 1    scopolamine (TRANSDERM-SCOP) 1.3-1.5 mg (1 mg over 3 days) SMARTSIG:Topical (Patient not taking: Reported on 8/9/2024)      spironolactone (ALDACTONE) 25 MG tablet Take 1 tablet (25 mg total) by mouth once daily. (Patient not taking: Reported on 8/9/2024) 90 tablet 2    traZODone (DESYREL) 50 MG tablet Take 1 tablet (50 mg total) by mouth nightly as needed for Insomnia or Depression. 30 tablet 0     No current facility-administered medications on file prior to visit.     Family History   Problem Relation Name Age of Onset    Hypertension Mother      Diabetes Mother      COPD Mother      Breast cancer Neg Hx      Colon cancer Neg Hx      Ovarian cancer Neg Hx         Medications Ordered                [id number] [address] [address]    Telephone: [phone]   Fax: [phone]   Hours: Not open 24 hours                         E-Prescribed (2 of 2)              nitrofurantoin, macrocrystal-monohydrate, (MACROBID) 100 MG capsule    Sig: Take 1 capsule (100 mg total) by mouth every 12 (twelve) hours. for 7 days       Start: 9/16/24     Quantity: 14 capsule Refills: 0                         phenazopyridine (PYRIDIUM) 100 MG tablet    Sig: Take 1 tablet (100 mg total) by mouth 3 (three) times daily as needed for Pain.       Start: 9/16/24     Quantity: 6 tablet Refills: 0                           Ohs Peq Odvv Intake    9/16/2024  9:09 AM CDT - Filed by Patient   What is your current physical address in the event of a medical emergency? [address]   Are you able to take your vital signs? Yes   Systolic Blood Pressure: 128   Diastolic Blood Pressure: 80   Weight: 230   Height: 67   Pulse: 89   Temperature: 98   Respiration rate: 88   Pulse Oxygen: 98   Please attach any  relevant images or files          UTI symptoms for 2 days. Reports frequency, urgency and dysuria. No hematuria. No f/c/n/v. No vaginal discharge. No severe back, flank or pelvic pain.       Urinary Tract Infection   Associated symptoms include frequency and urgency. Pertinent negatives include no chills, flank pain, hematuria, nausea or vomiting.       Constitution: Negative for chills and fever.   Gastrointestinal:  Negative for abdominal pain, nausea and vomiting.   Genitourinary:  Positive for dysuria, frequency and urgency. Negative for flank pain, hematuria, vaginal discharge, vaginal odor and pelvic pain.   Musculoskeletal:  Positive for back pain.        Objective:   The physical exam was conducted virtually.  Physical Exam   Constitutional: She is oriented to person, place, and time. She does not appear ill. No distress.   HENT:   Head: Normocephalic and atraumatic.   Nose: Nose normal.   Eyes: Extraocular movement intact   Pulmonary/Chest: Effort normal.   Abdominal: Normal appearance.   Musculoskeletal: Normal range of motion.         General: Normal range of motion.   Neurological: no focal deficit. She is alert and oriented to person, place, and time.   Psychiatric: Her behavior is normal. Mood normal.   Vitals reviewed.      Assessment:     1. Dysuria        Plan:     Patient encouraged to monitor symptoms closely and instructed to follow-up for new or worsening symptoms. Further, in-person, evaluation may be necessary for continued treatment. Please follow up with your primary care doctor or specialist as needed. Verbally discussed plan. Patient confirms understanding and is in agreement with treatment and plan.     You must understand that you've received a Virtual Care evaluation only and that you may be released before all your medical problems are known or treated. You, the patient, will arrange for follow up care as instructed.    Dysuria  -     nitrofurantoin, macrocrystal-monohydrate,  (MACROBID) 100 MG capsule; Take 1 capsule (100 mg total) by mouth every 12 (twelve) hours. for 7 days  Dispense: 14 capsule; Refill: 0  -     phenazopyridine (PYRIDIUM) 100 MG tablet; Take 1 tablet (100 mg total) by mouth 3 (three) times daily as needed for Pain.  Dispense: 6 tablet; Refill: 0

## 2024-10-09 ENCOUNTER — LAB VISIT (OUTPATIENT)
Dept: LAB | Facility: HOSPITAL | Age: 49
End: 2024-10-09
Attending: FAMILY MEDICINE
Payer: MEDICAID

## 2024-10-09 DIAGNOSIS — E78.2 MIXED HYPERLIPIDEMIA: ICD-10-CM

## 2024-10-09 DIAGNOSIS — I10 HTN (HYPERTENSION), BENIGN: ICD-10-CM

## 2024-10-09 LAB
ANION GAP SERPL CALC-SCNC: 7 MMOL/L (ref 8–16)
BUN SERPL-MCNC: 13 MG/DL (ref 6–20)
CALCIUM SERPL-MCNC: 9.3 MG/DL (ref 8.7–10.5)
CHLORIDE SERPL-SCNC: 108 MMOL/L (ref 95–110)
CHOLEST SERPL-MCNC: 330 MG/DL (ref 120–199)
CHOLEST/HDLC SERPL: 8 {RATIO} (ref 2–5)
CO2 SERPL-SCNC: 24 MMOL/L (ref 23–29)
CREAT SERPL-MCNC: 0.8 MG/DL (ref 0.5–1.4)
EST. GFR  (NO RACE VARIABLE): >60 ML/MIN/1.73 M^2
GLUCOSE SERPL-MCNC: 86 MG/DL (ref 70–110)
HDLC SERPL-MCNC: 41 MG/DL (ref 40–75)
HDLC SERPL: 12.4 % (ref 20–50)
LDLC SERPL CALC-MCNC: 266.6 MG/DL (ref 63–159)
NONHDLC SERPL-MCNC: 289 MG/DL
POTASSIUM SERPL-SCNC: 3.3 MMOL/L (ref 3.5–5.1)
SODIUM SERPL-SCNC: 139 MMOL/L (ref 136–145)
TRIGL SERPL-MCNC: 112 MG/DL (ref 30–150)

## 2024-10-09 PROCEDURE — 80048 BASIC METABOLIC PNL TOTAL CA: CPT | Performed by: FAMILY MEDICINE

## 2024-10-09 PROCEDURE — 80061 LIPID PANEL: CPT | Performed by: FAMILY MEDICINE

## 2024-10-09 PROCEDURE — 36415 COLL VENOUS BLD VENIPUNCTURE: CPT | Mod: PN | Performed by: FAMILY MEDICINE

## 2024-10-31 ENCOUNTER — HOSPITAL ENCOUNTER (EMERGENCY)
Facility: OTHER | Age: 49
Discharge: HOME | End: 2024-10-31
Attending: EMERGENCY MEDICINE
Payer: MEDICAID

## 2024-10-31 VITALS
DIASTOLIC BLOOD PRESSURE: 89 MMHG | SYSTOLIC BLOOD PRESSURE: 144 MMHG | HEIGHT: 67 IN | BODY MASS INDEX: 36.1 KG/M2 | TEMPERATURE: 98 F | RESPIRATION RATE: 18 BRPM | WEIGHT: 230 LBS | HEART RATE: 89 BPM | OXYGEN SATURATION: 96 %

## 2024-10-31 DIAGNOSIS — R07.9 CHEST PAIN: ICD-10-CM

## 2024-10-31 DIAGNOSIS — J20.9 ACUTE BRONCHITIS, UNSPECIFIED ORGANISM: Primary | ICD-10-CM

## 2024-10-31 LAB
ALBUMIN SERPL BCP-MCNC: 3.9 G/DL (ref 3.5–5.2)
ALP SERPL-CCNC: 57 U/L (ref 40–150)
ALT SERPL W/O P-5'-P-CCNC: 12 U/L (ref 10–44)
ANION GAP SERPL CALC-SCNC: 11 MMOL/L (ref 8–16)
AST SERPL-CCNC: 18 U/L (ref 10–40)
BASOPHILS # BLD AUTO: 0.05 K/UL (ref 0–0.2)
BASOPHILS NFR BLD: 0.3 % (ref 0–1.9)
BILIRUB SERPL-MCNC: 0.6 MG/DL (ref 0.1–1)
BNP SERPL-MCNC: 13 PG/ML (ref 0–99)
BUN SERPL-MCNC: 16 MG/DL (ref 6–20)
CALCIUM SERPL-MCNC: 9.9 MG/DL (ref 8.7–10.5)
CHLORIDE SERPL-SCNC: 102 MMOL/L (ref 95–110)
CO2 SERPL-SCNC: 25 MMOL/L (ref 23–29)
CREAT SERPL-MCNC: 1.1 MG/DL (ref 0.5–1.4)
CTP QC/QA: YES
CTP QC/QA: YES
DIFFERENTIAL METHOD BLD: ABNORMAL
EOSINOPHIL # BLD AUTO: 0.2 K/UL (ref 0–0.5)
EOSINOPHIL NFR BLD: 1 % (ref 0–8)
ERYTHROCYTE [DISTWIDTH] IN BLOOD BY AUTOMATED COUNT: 13 % (ref 11.5–14.5)
EST. GFR  (NO RACE VARIABLE): >60 ML/MIN/1.73 M^2
GLUCOSE SERPL-MCNC: 93 MG/DL (ref 70–110)
HCT VFR BLD AUTO: 37.4 % (ref 37–48.5)
HGB BLD-MCNC: 12.3 G/DL (ref 12–16)
IMM GRANULOCYTES # BLD AUTO: 0.04 K/UL (ref 0–0.04)
IMM GRANULOCYTES NFR BLD AUTO: 0.2 % (ref 0–0.5)
LYMPHOCYTES # BLD AUTO: 3.6 K/UL (ref 1–4.8)
LYMPHOCYTES NFR BLD: 22.2 % (ref 18–48)
MCH RBC QN AUTO: 28.9 PG (ref 27–31)
MCHC RBC AUTO-ENTMCNC: 32.9 G/DL (ref 32–36)
MCV RBC AUTO: 88 FL (ref 82–98)
MONOCYTES # BLD AUTO: 1.1 K/UL (ref 0.3–1)
MONOCYTES NFR BLD: 6.5 % (ref 4–15)
NEUTROPHILS # BLD AUTO: 11.3 K/UL (ref 1.8–7.7)
NEUTROPHILS NFR BLD: 69.8 % (ref 38–73)
NRBC BLD-RTO: 0 /100 WBC
OHS QRS DURATION: 68 MS
OHS QTC CALCULATION: 446 MS
PLATELET # BLD AUTO: 273 K/UL (ref 150–450)
PMV BLD AUTO: 10.6 FL (ref 9.2–12.9)
POC MOLECULAR INFLUENZA A AGN: NEGATIVE
POC MOLECULAR INFLUENZA B AGN: NEGATIVE
POTASSIUM SERPL-SCNC: 3.2 MMOL/L (ref 3.5–5.1)
PROT SERPL-MCNC: 8 G/DL (ref 6–8.4)
RBC # BLD AUTO: 4.26 M/UL (ref 4–5.4)
SARS-COV-2 RDRP RESP QL NAA+PROBE: NEGATIVE
SODIUM SERPL-SCNC: 138 MMOL/L (ref 136–145)
TROPONIN I SERPL DL<=0.01 NG/ML-MCNC: 0.01 NG/ML (ref 0–0.03)
WBC # BLD AUTO: 16.26 K/UL (ref 3.9–12.7)

## 2024-10-31 PROCEDURE — 93005 ELECTROCARDIOGRAM TRACING: CPT

## 2024-10-31 PROCEDURE — 99285 EMERGENCY DEPT VISIT HI MDM: CPT | Mod: 25

## 2024-10-31 PROCEDURE — 96372 THER/PROPH/DIAG INJ SC/IM: CPT | Performed by: EMERGENCY MEDICINE

## 2024-10-31 PROCEDURE — 36415 COLL VENOUS BLD VENIPUNCTURE: CPT | Performed by: EMERGENCY MEDICINE

## 2024-10-31 PROCEDURE — 87635 SARS-COV-2 COVID-19 AMP PRB: CPT | Performed by: NURSE PRACTITIONER

## 2024-10-31 PROCEDURE — 80053 COMPREHEN METABOLIC PANEL: CPT | Performed by: EMERGENCY MEDICINE

## 2024-10-31 PROCEDURE — 63600175 PHARM REV CODE 636 W HCPCS: Performed by: EMERGENCY MEDICINE

## 2024-10-31 PROCEDURE — 83880 ASSAY OF NATRIURETIC PEPTIDE: CPT | Performed by: EMERGENCY MEDICINE

## 2024-10-31 PROCEDURE — 84484 ASSAY OF TROPONIN QUANT: CPT | Performed by: EMERGENCY MEDICINE

## 2024-10-31 PROCEDURE — 85025 COMPLETE CBC W/AUTO DIFF WBC: CPT | Performed by: EMERGENCY MEDICINE

## 2024-10-31 PROCEDURE — 93010 ELECTROCARDIOGRAM REPORT: CPT | Mod: ,,, | Performed by: INTERNAL MEDICINE

## 2024-10-31 RX ORDER — AZITHROMYCIN 250 MG/1
TABLET, FILM COATED ORAL
Qty: 6 TABLET | Refills: 0 | Status: SHIPPED | OUTPATIENT
Start: 2024-10-31 | End: 2024-11-05

## 2024-10-31 RX ORDER — DEXAMETHASONE SODIUM PHOSPHATE 4 MG/ML
8 INJECTION, SOLUTION INTRA-ARTICULAR; INTRALESIONAL; INTRAMUSCULAR; INTRAVENOUS; SOFT TISSUE
Status: COMPLETED | OUTPATIENT
Start: 2024-10-31 | End: 2024-10-31

## 2024-10-31 RX ADMIN — DEXAMETHASONE SODIUM PHOSPHATE 8 MG: 4 INJECTION, SOLUTION INTRA-ARTICULAR; INTRALESIONAL; INTRAMUSCULAR; INTRAVENOUS; SOFT TISSUE at 05:10

## 2024-10-31 NOTE — FIRST PROVIDER EVALUATION
Emergency Department TeleTriage Encounter Note      CHIEF COMPLAINT    Chief Complaint   Patient presents with    Chest Pain     Constant nonradiating 8/10 midsternal CP with associated SOB and cough x 1 week, worsening with deep breathing. Reports Tmax 100.9 last night. Denies nausea, dizziness; speaking in complete sentences.        VITAL SIGNS   Initial Vitals [10/31/24 1500]   BP Pulse Resp Temp SpO2   109/71 (!) 115 18 98.4 °F (36.9 °C) 96 %      MAP       --            ALLERGIES    Review of patient's allergies indicates:   Allergen Reactions    Nsaids (non-steroidal anti-inflammatory drug) Other (See Comments)     Stomach issues       PROVIDER TRIAGE NOTE  Verbal consent for the teletriage evaluation was given by the patient at the start of the evaluation.  All efforts will be made to maintain patient's privacy during the evaluation.      This is a teletriage evaluation of a 48 y.o. female presenting to the ED with c/o coughing, body aches, and chest tightness with pain taking a deep breath. Limited physical exam via telehealth: The patient is awake, alert, answering questions appropriately and is not in respiratory distress.  As the Teletriage provider, I performed an initial assessment and ordered appropriate labs and imaging studies, if any, to facilitate the patient's care once placed in the ED. Once a room is available, care and a full evaluation will be completed by an alternate ED provider.  Any additional orders and the final disposition will be determined by that provider.  All imaging and labs will not be followed-up by the Teletriage Team, including myself.          ORDERS  Labs Reviewed - No data to display    ED Orders (720h ago, onward)      Start Ordered     Status Ordering Provider    10/31/24 1459 10/31/24 1458  EKG 12-lead  Once         Completed by JOLENE OGLESBY on 10/31/2024 at  2:59 PM VARUN AJ              Virtual Visit Note: The provider triage portion of this emergency  department evaluation and documentation was performed via [employer], a HIPAA-compliant telemedicine application, in concert with a tele-presenter in the room. A face to face patient evaluation with one of my colleagues will occur once the patient is placed in an emergency department room.      DISCLAIMER: This note was prepared with [employer] voice recognition transcription software. Garbled syntax, mangled pronouns, and other bizarre constructions may be attributed to that software system.

## 2024-10-31 NOTE — ED PROVIDER NOTES
Encounter Date: 10/31/2024    SCRIBE #1 NOTE: I, Beto Rodney, am scribing for, and in the presence of,  Lauren Waters MD. I have scribed the following portions of the note - Other sections scribed: HPI, ROS.       History     Chief Complaint   Patient presents with    Chest Pain     Constant nonradiating 8/10 midsternal CP with associated SOB and cough x 1 week, worsening with deep breathing. Reports Tmax 100.9 last night. Denies nausea, dizziness; speaking in complete sentences.      Time seen by provider: 4:00 PM    This is a 48 y.o. female who presents with complaint of chest pain. The patient reports a cold with associated cough and chest pain upon deep inspiration. She states that her cough is productive of yellowish sputum. She reports recording a fever of 100.9F yesterday for which she mentions taking two tylenols. However, she denies taking any medications today. In addition to these symptoms, she endorses a decrease in her appetite associated with vomiting and lower abdominal pain. She denies dysuria and a significant medical history at this time. This is the extent of the patient's complaints at this time.          The history is provided by the patient.     Review of patient's allergies indicates:   Allergen Reactions    Nsaids (non-steroidal anti-inflammatory drug) Other (See Comments)     Stomach issues     Past Medical History:   Diagnosis Date    Anemia, unspecified     Constipation     GI Dr Reece 2014    COVID-19     Elevated platelet count 03/11/2021    Refer hematol 3/2021    Familial hypercholesteremia     per digital lipid    Gastritis 07/30/2021 2021    Gastroesophageal reflux disease without esophagitis 08/28/2018    She states she had EGD in past    Grief 09/19/2017    mom passed 2017    HTN (hypertension), benign 03/10/2015    Digital med 12/20    Intestinal erosis 09/17/2021    CS 2021    Iron deficiency anemia secondary to inadequate dietary iron intake 08/26/2021    Start OTC 8/21     Knee pain, left 2012    scope, Dr Reyes, injury at work, treadmill helps with stiffness    Low blood potassium 07/30/2021    avoid HCTZ    Mixed hyperlipidemia 08/29/2013    Morbid obesity 02/23/2015    bariatrics not covered by insurance    SOB (shortness of breath)     Spinal cord stimulator status 08/28/2018    In back for L knee pain, Dr Kessler surgery & pain mgmt, Lyrica or Hydrocodone at night    Thyroid disease      Past Surgical History:   Procedure Laterality Date    COLONOSCOPY N/A 09/17/2021    Procedure: COLONOSCOPY;  Surgeon: Keith Mckeon MD;  Location: [facility] (4TH FLR);  Service: Endoscopy;  Laterality: N/A;  prep ins. emailed / COVID screening [facility] 2nd floor 9/14/21- ERW    DILATION AND CURETTAGE OF UTERUS      ESOPHAGOGASTRODUODENOSCOPY N/A 07/27/2021    Procedure: ESOPHAGOGASTRODUODENOSCOPY (EGD);  Surgeon: Keith Mckeon MD;  Location: [facility] (4TH FLR);  Service: Endoscopy;  Laterality: N/A;  COVID test 7/24 [facility]-    GASTRIC BYPASS N/A     HYSTEROSCOPY WITH DILATION AND CURETTAGE OF UTERUS N/A 09/21/2022    Procedure: HYSTEROSCOPY, WITH DILATION AND CURETTAGE OF UTERUS;  Surgeon: Kim Juarez MD;  Location: [facility];  Service: OB/GYN;  Laterality: N/A;    KNEE ARTHROSCOPY      L, Teddya    KNEE SURGERY      SKIN GRAFT      left finger due to injury    SPINAL CORD STIMULATOR IMPLANT      to L knee, Dr Kessler    TUBAL LIGATION       Family History   Problem Relation Name Age of Onset    Hypertension Mother      Diabetes Mother      COPD Mother      Breast cancer Neg Hx      Colon cancer Neg Hx      Ovarian cancer Neg Hx       Social History     Tobacco Use    Smoking status: Never    Smokeless tobacco: Never   Substance Use Topics    Alcohol use: No    Drug use: No     Review of Systems   Constitutional:  Positive for appetite change. Negative for chills and fever.   HENT:  Negative for rhinorrhea, sore throat and trouble swallowing.    Respiratory:  Positive for cough. Negative for  chest tightness, shortness of breath and wheezing.    Cardiovascular:  Positive for chest pain. Negative for palpitations and leg swelling.   Gastrointestinal:  Positive for abdominal pain and vomiting. Negative for diarrhea and nausea.   Genitourinary:  Negative for dysuria and vaginal bleeding.   Neurological:  Negative for speech difficulty and light-headedness.   All other systems reviewed and are negative.      Physical Exam     Initial Vitals [10/31/24 1500]   BP Pulse Resp Temp SpO2   109/71 (!) 115 18 98.4 °F (36.9 °C) 96 %      MAP       --         Physical Exam  Vitals and nursing note reviewed.   Constitutional:       Appearance: Normal appearance.   HENT:      Nose: Congestion present.      Mouth/Throat:      Mouth: Mucous membranes are moist.      Pharynx: No posterior oropharyngeal erythema.   Cardiovascular:      Rate and Rhythm: Normal rate and regular rhythm.   Pulmonary:      Effort: Pulmonary effort is normal.      Breath sounds: Normal breath sounds.   Abdominal:      General: Abdomen is flat. There is no distension.      Tenderness: There is no abdominal tenderness.   Musculoskeletal:         General: Normal range of motion.      Cervical back: Normal range of motion and neck supple.   Skin:     General: Skin is warm and dry.   Neurological:      General: No focal deficit present.      Mental Status: She is alert and oriented to person, place, and time.   Psychiatric:         Mood and Affect: Mood normal.         Behavior: Behavior normal.         Thought Content: Thought content normal.         Judgment: Judgment normal.            ED Course   Procedures  Labs Reviewed   CBC W/ AUTO DIFFERENTIAL - Abnormal       Result Value    WBC 16.26 (*)     RBC 4.26      Hemoglobin 12.3      Hematocrit 37.4      MCV 88      MCH 28.9      MCHC 32.9      RDW 13.0      Platelets 273      MPV 10.6      Immature Granulocytes 0.2      Gran # (ANC) 11.3 (*)     Immature Grans (Abs) 0.04      Lymph # 3.6      Mono  # 1.1 (*)     Eos # 0.2      Baso # 0.05      nRBC 0      Gran % 69.8      Lymph % 22.2      Mono % 6.5      Eosinophil % 1.0      Basophil % 0.3      Differential Method Automated     COMPREHENSIVE METABOLIC PANEL - Abnormal    Sodium 138      Potassium 3.2 (*)     Chloride 102      CO2 25      Glucose 93      BUN 16      Creatinine 1.1      Calcium 9.9      Total Protein 8.0      Albumin 3.9      Total Bilirubin 0.6      Alkaline Phosphatase 57      AST 18      ALT 12      eGFR >60      Anion Gap 11     TROPONIN I    Troponin I 0.009     B-TYPE NATRIURETIC PEPTIDE    BNP 13     POCT INFLUENZA A/B MOLECULAR    POC Molecular Influenza A Ag Negative      POC Molecular Influenza B Ag Negative       Acceptable Yes     SARS-COV-2 RDRP GENE    POC Rapid COVID Negative       Acceptable Yes         ECG Results              EKG 12-lead (Final result)        Collection Time Result Time QRS Duration OHS QTC Calculation    10/31/24 14:52:24 10/31/24 15:50:07 68 446                     Final result by Interface, Lab In [facility] (10/31/24 15:50:13)                   Narrative:    Test Reason : R07.9,    Vent. Rate : 106 BPM     Atrial Rate : 106 BPM     P-R Int : 140 ms          QRS Dur : 068 ms      QT Int : 336 ms       P-R-T Axes : 054 072 014 degrees     QTc Int : 446 ms    Sinus tachycardia  Otherwise normal ECG    Confirmed by Luis Eduardo Kim MD ([id number]) on 10/31/2024 3:50:03 PM    Referred By: [email or web address]   SELF           Confirmed By:Luis Eduardo Kim MD                                  Imaging Results              X-Ray Chest PA And Lateral (Final result)  Result time 10/31/24 16:37:31      Final result by Ar Estrella MD (10/31/24 16:37:31)                   Impression:      No radiographic acute intrathoracic process seen.      Electronically signed by: Ar Estrella MD  Date:    10/31/2024  Time:    16:37               Narrative:    EXAMINATION:  XR CHEST PA AND LATERAL    CLINICAL  HISTORY:  Chest pain;    TECHNIQUE:  PA and lateral views of the chest were performed.    COMPARISON:  Chest radiograph 09/02/2024, CTA chest 06/27/2022    FINDINGS:  Bibasilar mild platelike scarring versus atelectasis.  The lungs are otherwise symmetrically well expanded without consolidation, pleural effusion or pneumothorax.    The cardiac silhouette is normal in size. The hilar and mediastinal contours are in normal limits for age noting calcification at the arch.    Osseous structures appear stable without acute findings.  EKG stickers overlie the chest.                                       Medications   dexAMETHasone injection 8 mg (has no administration in time range)     Medical Decision Making  Differential Diagnosis includes, but is not limited to:  ACS/MI, PE, aortic dissection, pneumothorax, cardiac tamponade, pericarditis/myocarditis, pneumonia, infection/abscess, lung mass, trauma/fracture, costochondritis/pleurisy, MSK pain/contusion, GERD, biliary disease, pancreatitis, anemia     MDM:  The patient is a 48-year-old female who came to the emergency department with congestion and productive cough with yellow sputum.  She has had chest pain for the past 2-3 days and URI symptoms for the past week.  She had a fever yesterday of 100.9.  Her white count today is elevated at 16.26.  Her chest x-ray is clear however the patient is requesting a prescription for antibiotics.  I will give her a Z-Tejas and a shot of Decadron prior to discharge.    Amount and/or Complexity of Data Reviewed  External Data Reviewed: notes.  Labs: ordered. Decision-making details documented in ED Course.  Radiology: ordered and independent interpretation performed. Decision-making details documented in ED Course.  ECG/medicine tests: ordered and independent interpretation performed. Decision-making details documented in ED Course.    Risk  Prescription drug management.            Scribe Attestation:   Scribe #1: I performed the  above scribed service and the documentation accurately describes the services I performed. I attest to the accuracy of the note.        ED Course as of 10/31/24 1719   Thu Oct 31, 2024   1703 POCT Influenza A/B Molecular [ST]   1704 POCT COVID-19 Rapid Screening [ST]   1704 Troponin I #1 [ST]   1704 B-Type natriuretic peptide (BNP) [ST]   1704 Comprehensive metabolic panel(!) [ST]   1704 CBC auto differential(!) [ST]   1704 X-Ray Chest PA And Lateral  Impression:     No radiographic acute intrathoracic process seen.   [ST]      ED Course User Index  [ST] Lauren Waters MD          I, Lauren Waters, personally performed the services described in this documentation. All medical record entries made by the scribe were at my direction and in my presence.  I have reviewed the chart and agree that the record reflects my personal performance and is accurate and complete. Lauren Waters M.D. 5:20 PM10/31/2024             Clinical Impression:  Final diagnoses:  [R07.9] Chest pain  [J20.9] Acute bronchitis, unspecified organism (Primary)          ED Disposition Condition    Discharge Stable          ED Prescriptions       Medication Sig Dispense Start Date End Date Auth. Provider    azithromycin (Z-TOM) 250 MG tablet Take 2 tablets by mouth on day 1; Take 1 tablet by mouth on days 2-5 6 tablet 10/31/2024 11/5/2024 Lauren Waters MD          Follow-up Information    None          Lauren Waters MD  10/31/24 2416

## 2024-11-14 ENCOUNTER — ON-DEMAND VIRTUAL (OUTPATIENT)
Dept: URGENT CARE | Facility: CLINIC | Age: 49
End: 2024-11-14
Payer: MEDICAID

## 2024-11-14 DIAGNOSIS — R51.9 ACUTE INTRACTABLE HEADACHE, UNSPECIFIED HEADACHE TYPE: Primary | ICD-10-CM

## 2024-11-14 PROCEDURE — 99213 OFFICE O/P EST LOW 20 MIN: CPT | Mod: 95,,, | Performed by: NURSE PRACTITIONER

## 2024-11-14 RX ORDER — SUMATRIPTAN SUCCINATE 25 MG/1
25 TABLET ORAL ONCE
Qty: 4 TABLET | Refills: 0 | Status: SHIPPED | OUTPATIENT
Start: 2024-11-14 | End: 2024-11-14

## 2024-11-14 NOTE — PROGRESS NOTES
Subjective:      Patient ID: Rama Moy is a 48 y.o. female.    Vitals:  vitals were not taken for this visit.     Chief Complaint: Headache      Visit Type: TELE AUDIOVISUAL - This visit was conducted virtually based on  subjective information and limited objective exam    Present with the patient at the time of consultation: TELEMED PRESENT WITH PATIENT: None  Two patient identifiers used to verify patient- saying out date of birth and full name.       Past Medical History:   Diagnosis Date    Anemia, unspecified     Constipation     GI Dr Reece 2014    COVID-19     Elevated platelet count 03/11/2021    Refer hematol 3/2021    Familial hypercholesteremia     per digital lipid    Gastritis 07/30/2021 2021    Gastroesophageal reflux disease without esophagitis 08/28/2018    She states she had EGD in past    Grief 09/19/2017    mom passed 2017    HTN (hypertension), benign 03/10/2015    Digital med 12/20    Intestinal erosis 09/17/2021    CS 2021    Iron deficiency anemia secondary to inadequate dietary iron intake 08/26/2021    Start OTC 8/21    Knee pain, left 2012    scope, Dr Reyes, injury at work, treadmill helps with stiffness    Low blood potassium 07/30/2021    avoid HCTZ    Mixed hyperlipidemia 08/29/2013    Morbid obesity 02/23/2015    bariatrics not covered by insurance    SOB (shortness of breath)     Spinal cord stimulator status 08/28/2018    In back for L knee pain, Dr Kessler surgery & pain mgmt, Lyrica or Hydrocodone at night    Thyroid disease      Past Surgical History:   Procedure Laterality Date    COLONOSCOPY N/A 09/17/2021    Procedure: COLONOSCOPY;  Surgeon: Keith Mckeon MD;  Location: [address];  Service: Endoscopy;  Laterality: N/A;  prep ins. emailed / COVID screening [facility] [place] [facility] 9/14/21- ERW    DILATION AND CURETTAGE OF UTERUS      ESOPHAGOGASTRODUODENOSCOPY N/A 07/27/2021    Procedure: ESOPHAGOGASTRODUODENOSCOPY (EGD);  Surgeon: Keith Mckeon MD;  Location:  [facility] (4TH FLR);  Service: Endoscopy;  Laterality: N/A;  COVID test 7/24 [facility]-    GASTRIC BYPASS N/A     HYSTEROSCOPY WITH DILATION AND CURETTAGE OF UTERUS N/A 09/21/2022    Procedure: HYSTEROSCOPY, WITH DILATION AND CURETTAGE OF UTERUS;  Surgeon: Kim Juarez MD;  Location: [facility];  Service: OB/GYN;  Laterality: N/A;    KNEE ARTHROSCOPY      L, Dasa    KNEE SURGERY      SKIN GRAFT      left finger due to injury    SPINAL CORD STIMULATOR IMPLANT      to L knee, Dr Kessler    TUBAL LIGATION       Review of patient's allergies indicates:   Allergen Reactions    Nsaids (non-steroidal anti-inflammatory drug) Other (See Comments)     Stomach issues     Current Outpatient Medications on File Prior to Visit   Medication Sig Dispense Refill    albuterol (PROVENTIL/VENTOLIN HFA) 90 mcg/actuation inhaler INHALE 1 PUFFS BY MOUTH INTO THE LUNGS EVERY 6 HOURS AS NEEDED FOR WHEEZING. RESCUE 54 g 2    blood pressure monitor ([employer] EASE) XL arm size (OP) by Other route as needed for High Blood Pressure. 1 each 0    ezetimibe (ZETIA) 10 mg tablet Take 1 tablet (10 mg total) by mouth once daily. 90 tablet 3    LIDOcaine (LIDODERM) 5 % Place 1 patch onto the skin once daily. Remove & Discard patch within 12 hours or as directed by MD 30 patch 0    mometasone (NASONEX) 50 mcg/actuation nasal spray 2 sprays by Nasal route once daily. 17 g 0    potassium chloride SA (K-DUR,KLOR-CON) 20 MEQ tablet Take 1 tablet (20 mEq total) by mouth once daily. 30 tablet 0    rosuvastatin (CRESTOR) 40 MG Tab TAKE 1 TABLET(40 MG) BY MOUTH EVERY EVENING 90 tablet 1    sucralfate (CARAFATE) 100 mg/mL suspension Take by mouth.      telmisartan (MICARDIS) 40 MG Tab Take 1 tablet (40 mg total) by mouth once daily. 90 tablet 3    traZODone (DESYREL) 50 MG tablet Take 1 tablet (50 mg total) by mouth nightly as needed for Insomnia or Depression. 30 tablet 0     No current facility-administered medications on file prior to visit.     Family History    Problem Relation Name Age of Onset    Hypertension Mother      Diabetes Mother      COPD Mother      Breast cancer Neg Hx      Colon cancer Neg Hx      Ovarian cancer Neg Hx             Ohs Peq Odvv Intake    11/14/2024  1:19 PM CST - Filed by Patient   What is your current physical address in the event of a medical emergency? [address]    Are you able to take your vital signs? No   Please attach any relevant images or files    Is your employer contracted with [facility]? No         47 yo female with c/o headache behind eyes and temple area for one week. She denies nausea and vomiting. States she does have diarrhea. She states started about the same time. She is trying to hydrate. She denies blurred vision. Denies weakness. She has tried otc medication        Constitution: Negative.   HENT: Negative.     Cardiovascular: Negative.    Respiratory: Negative.     Gastrointestinal: Negative.    Endocrine: negative.   Genitourinary: Negative.  Negative for frequency and urgency.   Musculoskeletal: Negative.    Skin: Negative.    Allergic/Immunologic: Negative.    Neurological:  Positive for headaches.   Hematologic/Lymphatic: Negative.    Psychiatric/Behavioral: Negative.          Objective:   The physical exam was conducted virtually.  LOCATION OF PATIENT [place]  AAO x 3 ; no acute distress noted; appearance normal; mood and behavior normal; thought process normal  Head- normocephalic  Nose- appears normal, no discharge or erythema  Eyes- pupils appear normal in size, no drainage, no erythema  Ears- normal appearing; no discharge, no erythema  Mouth- appears normal  Oropharynx- no erythema, lesions  Lungs- breathing at a normal rate, no acute distress noted  Heart- no reports of tachycardia, palpitations, chest pain  Abdomen- non distended, non tender reported by patient  Skin- warm and dry, no erythema or edema noted by patient or visualized  Psych- as above; no si/hi      Assessment:      1. Acute intractable headache, unspecified headache type        Plan:         Thank you for choosing [facility]!    Our goal in the [facility] is to always provide outstanding medical care. You may receive a survey by mail or e-mail in the next week regarding your experience today. We would greatly appreciate you completing and returning the survey. Your feedback provides us with a way to recognize our staff who provide very good care, and it helps us learn how to improve when your experience was below our aspiration of excellence.         We appreciate you trusting us with your medical care. We hope you feel better soon. We will be happy to take care of you for all of your future medical needs.    You must understand that you've received an Urgent Care treatment only and that you may be released before all your medical problems are known or treated. You, the patient, will arrange for follow up care as instructed.    Follow up with your PCP or specialty clinic as directed in the next 1-2 weeks if not improved or as needed.  You can call [phone] to schedule an appointment with the appropriate provider.    If your condition worsens we recommend that you receive another evaluation in person, with your primary care provider, urgent care or at the emergency room immediately or contact your primary medical clinics after hours call service to discuss your concerns.         Acute intractable headache, unspecified headache type

## 2024-11-20 ENCOUNTER — LAB VISIT (OUTPATIENT)
Dept: LAB | Facility: HOSPITAL | Age: 49
End: 2024-11-20
Attending: FAMILY MEDICINE
Payer: MEDICAID

## 2024-11-20 DIAGNOSIS — E78.2 MIXED HYPERLIPIDEMIA: ICD-10-CM

## 2024-11-20 DIAGNOSIS — E87.6 HYPOKALEMIA: ICD-10-CM

## 2024-11-20 LAB
ANION GAP SERPL CALC-SCNC: 8 MMOL/L (ref 8–16)
BUN SERPL-MCNC: 20 MG/DL (ref 6–20)
CALCIUM SERPL-MCNC: 9.6 MG/DL (ref 8.7–10.5)
CHLORIDE SERPL-SCNC: 106 MMOL/L (ref 95–110)
CHOLEST SERPL-MCNC: 183 MG/DL (ref 120–199)
CHOLEST/HDLC SERPL: 4.1 {RATIO} (ref 2–5)
CO2 SERPL-SCNC: 24 MMOL/L (ref 23–29)
CREAT SERPL-MCNC: 0.9 MG/DL (ref 0.5–1.4)
EST. GFR  (NO RACE VARIABLE): >60 ML/MIN/1.73 M^2
GLUCOSE SERPL-MCNC: 84 MG/DL (ref 70–110)
HDLC SERPL-MCNC: 45 MG/DL (ref 40–75)
HDLC SERPL: 24.6 % (ref 20–50)
LDLC SERPL CALC-MCNC: 116.4 MG/DL (ref 63–159)
NONHDLC SERPL-MCNC: 138 MG/DL
POTASSIUM SERPL-SCNC: 3.7 MMOL/L (ref 3.5–5.1)
SODIUM SERPL-SCNC: 138 MMOL/L (ref 136–145)
TRIGL SERPL-MCNC: 108 MG/DL (ref 30–150)

## 2024-11-20 PROCEDURE — 36415 COLL VENOUS BLD VENIPUNCTURE: CPT | Mod: PN | Performed by: FAMILY MEDICINE

## 2024-11-20 PROCEDURE — 80061 LIPID PANEL: CPT | Performed by: FAMILY MEDICINE

## 2024-11-20 PROCEDURE — 80048 BASIC METABOLIC PNL TOTAL CA: CPT | Performed by: STUDENT IN AN ORGANIZED HEALTH CARE EDUCATION/TRAINING PROGRAM

## 2024-12-22 ENCOUNTER — PATIENT MESSAGE (OUTPATIENT)
Dept: ADMINISTRATIVE | Facility: OTHER | Age: 49
End: 2024-12-22
Payer: MEDICAID

## 2025-01-02 ENCOUNTER — OFFICE VISIT (OUTPATIENT)
Dept: OBSTETRICS AND GYNECOLOGY | Facility: CLINIC | Age: 50
End: 2025-01-02
Attending: OBSTETRICS & GYNECOLOGY
Payer: MEDICAID

## 2025-01-02 VITALS
SYSTOLIC BLOOD PRESSURE: 112 MMHG | HEIGHT: 67 IN | WEIGHT: 218.25 LBS | BODY MASS INDEX: 34.26 KG/M2 | DIASTOLIC BLOOD PRESSURE: 72 MMHG

## 2025-01-02 DIAGNOSIS — Z12.31 VISIT FOR SCREENING MAMMOGRAM: ICD-10-CM

## 2025-01-02 DIAGNOSIS — N95.1 SYMPTOMATIC MENOPAUSAL OR FEMALE CLIMACTERIC STATES: ICD-10-CM

## 2025-01-02 DIAGNOSIS — Z01.419 WELL WOMAN EXAM WITH ROUTINE GYNECOLOGICAL EXAM: Primary | ICD-10-CM

## 2025-01-02 PROBLEM — E78.00 PURE HYPERCHOLESTEROLEMIA: Status: ACTIVE | Noted: 2024-05-13

## 2025-01-02 PROBLEM — Z01.810 PREOP CARDIOVASCULAR EXAM: Status: RESOLVED | Noted: 2021-09-17 | Resolved: 2025-01-02

## 2025-01-02 PROBLEM — M48.00 SPINAL STENOSIS: Status: ACTIVE | Noted: 2024-03-15

## 2025-01-02 PROBLEM — E87.6 HYPOKALEMIA: Status: RESOLVED | Noted: 2021-07-30 | Resolved: 2025-01-02

## 2025-01-02 PROCEDURE — 1160F RVW MEDS BY RX/DR IN RCRD: CPT | Mod: CPTII,,, | Performed by: OBSTETRICS & GYNECOLOGY

## 2025-01-02 PROCEDURE — 99214 OFFICE O/P EST MOD 30 MIN: CPT | Mod: PBBFAC | Performed by: OBSTETRICS & GYNECOLOGY

## 2025-01-02 PROCEDURE — 99999 PR PBB SHADOW E&M-EST. PATIENT-LVL IV: CPT | Mod: PBBFAC,,, | Performed by: OBSTETRICS & GYNECOLOGY

## 2025-01-02 PROCEDURE — 99396 PREV VISIT EST AGE 40-64: CPT | Mod: S$PBB,,, | Performed by: OBSTETRICS & GYNECOLOGY

## 2025-01-02 PROCEDURE — 1159F MED LIST DOCD IN RCRD: CPT | Mod: CPTII,,, | Performed by: OBSTETRICS & GYNECOLOGY

## 2025-01-02 PROCEDURE — 3078F DIAST BP <80 MM HG: CPT | Mod: CPTII,,, | Performed by: OBSTETRICS & GYNECOLOGY

## 2025-01-02 PROCEDURE — 3074F SYST BP LT 130 MM HG: CPT | Mod: CPTII,,, | Performed by: OBSTETRICS & GYNECOLOGY

## 2025-01-02 PROCEDURE — 3008F BODY MASS INDEX DOCD: CPT | Mod: CPTII,,, | Performed by: OBSTETRICS & GYNECOLOGY

## 2025-01-02 RX ORDER — PROGESTERONE 100 MG/1
100 CAPSULE ORAL NIGHTLY
Qty: 12 CAPSULE | Refills: 11 | Status: SHIPPED | OUTPATIENT
Start: 2025-01-02 | End: 2026-01-02

## 2025-01-02 RX ORDER — PANTOPRAZOLE SODIUM 40 MG/1
40 TABLET, DELAYED RELEASE ORAL
COMMUNITY
Start: 2024-12-31

## 2025-01-02 RX ORDER — ESTRADIOL 0.05 MG/D
1 FILM, EXTENDED RELEASE TRANSDERMAL
Qty: 8 PATCH | Refills: 12 | Status: SHIPPED | OUTPATIENT
Start: 2025-01-02 | End: 2026-01-02

## 2025-01-02 NOTE — PROGRESS NOTES
SUBJECTIVE:   49 y.o. female   for annual routine Pap and checkup. Patient's last menstrual period was 2021..  She complains of hot flashes and wishes to start hrt.  It has been over a year since her last cycle.  Since that time she has also had gastric bypass.  Her most recent lipid panel is normal.  Her bp is well controlled.  Her cardiac risk score is low at 2.6%.  She is due for a mammogram and is up to date with pap and colonoscopy.        Past Medical History:   Diagnosis Date    Amenorrhea     Its been a year without a cycle and  and  i have seen a period    Anemia, unspecified     Constipation     GI Dr Reece     COVID-19     Elevated platelet count 2021    Refer hematol 3/2021    Familial hypercholesteremia     per digital lipid    Gastritis 2021    Gastroesophageal reflux disease without esophagitis 2018    She states she had EGD in past    Grief 2017    mom passed 2017    HTN (hypertension), benign 03/10/2015    Digital med     Intestinal erosis 2021    CS     Iron deficiency anemia secondary to inadequate dietary iron intake 2021    Start OTC     Knee pain, left     scope, Dr Reyes, injury at work, treadmill helps with stiffness    Low blood potassium 2021    avoid HCTZ    Mixed hyperlipidemia 2013    Morbid obesity 2015    bariatrics not covered by insurance    Spinal cord stimulator status 2018    In back for L knee pain, Dr Kessler surgery & pain mgmt, Lyrica or Hydrocodone at night    Thyroid disease      Past Surgical History:   Procedure Laterality Date    COLONOSCOPY N/A 2021    Procedure: COLONOSCOPY;  Surgeon: Keith Mckeon MD;  Location: [facility] ([address] [place] [address]);  Service: Endoscopy;  Laterality: N/A;  prep ins. emailed / COVID screening [facility] [place] 2nd [facility] 21- ERW    DILATION AND CURETTAGE OF UTERUS      ESOPHAGOGASTRODUODENOSCOPY N/A 2021    Procedure:  ESOPHAGOGASTRODUODENOSCOPY (EGD);  Surgeon: Keith Mckeon MD;  Location: [facility] ([address] [place] [address]);  Service: Endoscopy;  Laterality: N/A;  COVID test 7/24 [facility]    GASTRIC BYPASS N/A     HYSTEROSCOPY WITH DILATION AND CURETTAGE OF UTERUS N/A 09/21/2022    Procedure: HYSTEROSCOPY, WITH DILATION AND CURETTAGE OF UTERUS;  Surgeon: Kim Juarez MD;  Location: [facility];  Service: OB/GYN;  Laterality: N/A;    KNEE ARTHROSCOPY      L, Dasa    KNEE SURGERY      SKIN GRAFT      left finger due to injury    SPINAL CORD STIMULATOR IMPLANT      to L knee, Dr Kessler    TUBAL LIGATION       Social History     Socioeconomic History    Marital status:    Occupational History    Occupation: works as survelliance agent   Tobacco Use    Smoking status: Never    Smokeless tobacco: Never   Substance and Sexual Activity    Alcohol use: No    Drug use: No    Sexual activity: Yes     Partners: Male     Birth control/protection: None     Social Drivers of Health     Financial Resource Strain: Low Risk  (3/7/2024)    Received from [facility]    Overall Financial Resource Strain (CARDIA)     Difficulty of Paying Living Expenses: Not hard at all   Food Insecurity: No Food Insecurity (7/2/2024)    Received from [facility]    Hunger Vital Sign     Worried About Running Out of Food in the Last Year: Never true     Ran Out of Food in the Last Year: Never true   Transportation Needs: No Transportation Needs (7/2/2024)    Received from [facility]    PRAPARE - Transportation     Lack of Transportation (Medical): No     Lack of Transportation (Non-Medical): No   Physical Activity: Sufficiently Active (3/7/2024)    Received from [facility]    Exercise Vital Sign     Days of Exercise per Week: 7 days     Minutes of Exercise per Session: 30 min   Stress: No Stress Concern Present (3/7/2024)    Received from [facility]    [employer] - Occupational Stress Questionnaire     Feeling of Stress : Not at all   Housing  Stability: Low Risk  (12/3/2023)    Housing Stability Vital Sign     Unable to Pay for Housing in the Last Year: No     Number of Places Lived in the Last Year: 1     Unstable Housing in the Last Year: No     Family History   Problem Relation Name Age of Onset    Hypertension Mother Margaret     Diabetes Mother Margaret     COPD Mother Margaret     Breast cancer Neg Hx      Colon cancer Neg Hx      Ovarian cancer Neg Hx       OB History    Para Term  AB Living   2 1 1         SAB IAB Ectopic Multiple Live Births           1      # Outcome Date GA Lbr Vivek/2nd Weight Sex Type Anes PTL Lv   2             1 Term               Obstetric Comments   Pt states only pregnant one time;          Current Outpatient Medications   Medication Sig Dispense Refill    albuterol (PROVENTIL/VENTOLIN HFA) 90 mcg/actuation inhaler INHALE 1 PUFFS BY MOUTH INTO THE LUNGS EVERY 6 HOURS AS NEEDED FOR WHEEZING. RESCUE 54 g 2    blood pressure monitor ([employer] EASE) XL arm size (OP) by Other route as needed for High Blood Pressure. 1 each 0    ezetimibe (ZETIA) 10 mg tablet Take 1 tablet (10 mg total) by mouth once daily. 90 tablet 3    LIDOcaine (LIDODERM) 5 % Place 1 patch onto the skin once daily. Remove & Discard patch within 12 hours or as directed by MD 30 patch 0    mometasone (NASONEX) 50 mcg/actuation nasal spray 2 sprays by Nasal route once daily. 17 g 0    pantoprazole (PROTONIX) 40 MG tablet Take 40 mg by mouth.      potassium chloride SA (K-DUR,KLOR-CON) 20 MEQ tablet Take 1 tablet (20 mEq total) by mouth once daily. 60 tablet 0    rosuvastatin (CRESTOR) 40 MG Tab TAKE 1 TABLET(40 MG) BY MOUTH EVERY EVENING 90 tablet 1    sucralfate (CARAFATE) 100 mg/mL suspension Take by mouth.      telmisartan (MICARDIS) 40 MG Tab Take 1 tablet (40 mg total) by mouth once daily. 90 tablet 3    estradiol 0.05 mg/24 hr td ptsw (VIVELLE-DOT) 0.05 mg/24 hr Place 1 patch onto the skin twice a week. 8 patch 12    progesterone (PROMETRIUM)  "100 MG capsule Take 1 capsule (100 mg total) by mouth nightly. 12 capsule 11    sumatriptan (IMITREX) 25 MG Tab Take 1 tablet (25 mg total) by mouth once. Take 25 mg now. May  repeat in 2 hours x 1 dose for 1 dose 4 tablet 0    traZODone (DESYREL) 50 MG tablet Take 1 tablet (50 mg total) by mouth nightly as needed for Insomnia or Depression. 30 tablet 0     No current facility-administered medications for this visit.     Allergies: Nsaids (non-steroidal anti-inflammatory drug)     ROS:  Constitutional: no weight loss, weight gain, fever, fatigue  Eyes:  No vision changes, glasses/contacts  ENT/Mouth: No ulcers, sinus problems, ears ringing, headache  Cardiovascular: No inability to lie flat, chest pain, exercise intolerance, swelling, heart palpitations  Respiratory: No wheezing, coughing blood, shortness of breath, or cough  Gastrointestinal: No diarrhea, bloody stool, nausea/vomiting, constipation, gas, hemorrhoids  Genitourinary: No blood in urine, painful urination, urgency of urination, frequency of urination, incomplete emptying, incontinence, abnormal bleeding, painful periods, heavy periods, vaginal discharge, vaginal odor, painful intercourse, sexual problems, bleeding after intercourse.  Musculoskeletal: No muscle weakness  Skin/Breast: No painful breasts, nipple discharge, masses, rash, ulcers  Neurological: No passing out, seizures, numbness, headache  Endocrine: No diabetes, hypothyroid, hyperthyroid, hot flashes, hair loss, abnormal hair growth, ance  Psychiatric: No depression, crying  Hematologic: No bruises, bleeding, swollen lymph nodes, anemia.      OBJECTIVE:   The patient appears well, alert, oriented x 3, in no distress.  /72 (BP Location: Left arm, Patient Position: Sitting)   Ht 5' 7" (1.702 m)   Wt 99 kg (218 lb 4.1 oz)   LMP 04/27/2021   BMI 34.18 kg/m²   NECK: no thyromegaly, trachea midline  SKIN: no acne, striae, hirsutism  BREAST EXAM: breasts appear normal, no suspicious " masses, no skin or nipple changes or axillary nodes  ABDOMEN: no hernias, masses, or hepatosplenomegaly  GENITALIA: normal external genitalia, no erythema, no discharge  URETHRA: normal urethra, normal urethral meatus  VAGINA: Normal  CERVIX: no lesions or cervical motion tenderness  UTERUS: normal size, contour, position, consistency, mobility, non-tender  ADNEXA: normal adnexa and no mass, fullness, tenderness    \  ASSESSMENT:   Sia was seen today for gynecologic exam.    Diagnoses and all orders for this visit:    Well woman exam with routine gynecological exam    Visit for screening mammogram  -     Mammo Digital Screening Bilat w/ [employer]; Future    Symptomatic menopausal or female climacteric states  -     estradiol 0.05 mg/24 hr td ptsw (VIVELLE-DOT) 0.05 mg/24 hr; Place 1 patch onto the skin twice a week.  -     progesterone (PROMETRIUM) 100 MG capsule; Take 1 capsule (100 mg total) by mouth nightly.      A full discussion of the benefit-risk ratio of hormonal replacement therapy was carried out. Improvement in vasomotor and other climacteric symptoms is discussed, including possible improvements in sleep and mood. Reduction of risk for osteoporosis was explained. We discussed the study data showing increased risk of thrombo-embolic events such as myocardial infarction, stroke and also possibly breast cancer with estrogen replacement, and how this might affect her. The range of side effects such as breast tenderness, weight gain and including possible increases in lifetime risk of breast cancer and possible thrombotic complications was discussed. We also discussed ACOG's recommendation to use hormone replacement therapy for the relief of hot flashes alone and to be on the lowest dose possible for the shortest amount of time.  Alternative such as herbal and soy-based products were reviewed. All of her questions about this therapy were answered.

## 2025-01-12 ENCOUNTER — HOSPITAL ENCOUNTER (EMERGENCY)
Facility: OTHER | Age: 50
Discharge: HOME | End: 2025-01-12
Attending: EMERGENCY MEDICINE
Payer: MEDICAID

## 2025-01-12 VITALS
HEART RATE: 100 BPM | WEIGHT: 218 LBS | DIASTOLIC BLOOD PRESSURE: 61 MMHG | HEIGHT: 67 IN | RESPIRATION RATE: 16 BRPM | SYSTOLIC BLOOD PRESSURE: 136 MMHG | BODY MASS INDEX: 34.21 KG/M2 | TEMPERATURE: 100 F | OXYGEN SATURATION: 96 %

## 2025-01-12 DIAGNOSIS — J10.1 INFLUENZA A: Primary | ICD-10-CM

## 2025-01-12 LAB
CTP QC/QA: YES
CTP QC/QA: YES
POC MOLECULAR INFLUENZA A AGN: POSITIVE
POC MOLECULAR INFLUENZA B AGN: NEGATIVE
SARS-COV-2 RDRP RESP QL NAA+PROBE: NEGATIVE

## 2025-01-12 PROCEDURE — 87635 SARS-COV-2 COVID-19 AMP PRB: CPT | Performed by: NURSE PRACTITIONER

## 2025-01-12 PROCEDURE — 99284 EMERGENCY DEPT VISIT MOD MDM: CPT

## 2025-01-12 PROCEDURE — 25000003 PHARM REV CODE 250: Performed by: NURSE PRACTITIONER

## 2025-01-12 RX ORDER — ACETAMINOPHEN 500 MG
1000 TABLET ORAL
Status: COMPLETED | OUTPATIENT
Start: 2025-01-12 | End: 2025-01-12

## 2025-01-12 RX ORDER — PROMETHAZINE HYDROCHLORIDE AND DEXTROMETHORPHAN HYDROBROMIDE 6.25; 15 MG/5ML; MG/5ML
5 SYRUP ORAL EVERY 4 HOURS PRN
Qty: 118 ML | Refills: 0 | Status: SHIPPED | OUTPATIENT
Start: 2025-01-12 | End: 2025-01-22

## 2025-01-12 RX ORDER — GUAIFENESIN AND DEXTROMETHORPHAN HYDROBROMIDE 10; 100 MG/5ML; MG/5ML
10 SYRUP ORAL
Status: COMPLETED | OUTPATIENT
Start: 2025-01-12 | End: 2025-01-12

## 2025-01-12 RX ORDER — ALBUTEROL SULFATE 90 UG/1
2 INHALANT RESPIRATORY (INHALATION) EVERY 6 HOURS PRN
Qty: 18 G | Refills: 0 | Status: SHIPPED | OUTPATIENT
Start: 2025-01-12 | End: 2026-01-12

## 2025-01-12 RX ADMIN — ACETAMINOPHEN 1000 MG: 500 TABLET, FILM COATED ORAL at 07:01

## 2025-01-12 RX ADMIN — GUAIFENESIN AND DEXTROMETHORPHAN 10 ML: 100; 10 SYRUP ORAL at 07:01

## 2025-01-12 NOTE — FIRST PROVIDER EVALUATION
Emergency Department TeleTriage Encounter Note      CHIEF COMPLAINT    Chief Complaint   Patient presents with    Cough     Cough, chills, congestion, and fever x 1 day.       VITAL SIGNS   Initial Vitals [01/12/25 1743]   BP Pulse Resp Temp SpO2   139/85 (!) 119 18 (!) 102 °F (38.9 °C) 95 %      MAP       --            ALLERGIES    Review of patient's allergies indicates:   Allergen Reactions    Nsaids (non-steroidal anti-inflammatory drug) Other (See Comments)     Stomach issues       PROVIDER TRIAGE NOTE  Cough, congestion, body aches, fever that began yesterday. Has not taken any medication for her symptoms. Unable to take NSAIDs.     Limited physical exam via telehealth: The patient is awake, alert, answering questions appropriately and is not in respiratory distress.  As the Teletriage provider, I performed an initial assessment and ordered appropriate labs and imaging studies, if any, to facilitate the patient's care once placed in the ED. Once a room is available, care and a full evaluation will be completed by an alternate ED provider.  Any additional orders and the final disposition will be determined by that provider.  All imaging and labs will not be followed-up by the Teletriage Team, including myself.          ORDERS  Labs Reviewed - No data to display    ED Orders (720h ago, onward)      Start Ordered     Status Ordering Provider    01/12/25 1800 01/12/25 1748  acetaminophen tablet 1,000 mg  ED 1 Time         Ordered TOBIAS DUNLAP    01/12/25 1749 01/12/25 1748  POCT Influenza A/B Molecular  Once         Ordered TOBIAS DUNLAP    01/12/25 1749 01/12/25 1748  POCT COVID-19 Rapid Screening  Once         Ordered TOBIAS DUNLAP              Virtual Visit Note: The provider triage portion of this emergency department evaluation and documentation was performed via [employer], a HIPAA-compliant telemedicine application, in concert with a tele-presenter in the room. A face to face patient  evaluation with one of my colleagues will occur once the patient is placed in an emergency department room.      DISCLAIMER: This note was prepared with [employer] voice recognition transcription software. Garbled syntax, mangled pronouns, and other bizarre constructions may be attributed to that software system.

## 2025-01-12 NOTE — Clinical Note
"Rama Northjamil Moy was seen and treated in our emergency department on 1/12/2025.  She may return to work on 01/16/2025.       If you have any questions or concerns, please don't hesitate to call.      Blanka Brothers, FNP"

## 2025-01-13 NOTE — ED PROVIDER NOTES
Source of History:  Patient    Chief complaint:  Cough (Cough, chills, congestion, and fever x 1 day.)      HPI:  Rama Moy is a 49 y.o. female presenting with cough/congestion, fever, body aches that began yesterday.  Reports coughing is worse at night.  Past medical history includes anemia, high cholesterol, hypertension.  Unknown sick contacts.  No shortness breath for chest pain.  She does endorse diarrhea but no nausea vomiting.  Denies any abdominal pain.    This is the extent to the patients complaints today here in the emergency department.    PMH:  As per HPI and below:  Past Medical History:   Diagnosis Date    Amenorrhea July 9    Its been a year without a cycle and July 9 and August 6 i have seen a period    Anemia, unspecified     Constipation     GI Dr Reece 2014    COVID-19     Elevated platelet count 03/11/2021    Refer hematol 3/2021    Familial hypercholesteremia     per digital lipid    Gastritis 07/30/2021 2021    Gastroesophageal reflux disease without esophagitis 08/28/2018    She states she had EGD in past    Grief 09/19/2017    mom passed 2017    HTN (hypertension), benign 03/10/2015    Digital med 12/20    Intestinal erosis 09/17/2021    CS 2021    Iron deficiency anemia secondary to inadequate dietary iron intake 08/26/2021    Start OTC 8/21    Knee pain, left 2012    scope, Dr Reyes, injury at work, treadmill helps with stiffness    Low blood potassium 07/30/2021    avoid HCTZ    Mixed hyperlipidemia 08/29/2013    Morbid obesity 02/23/2015    bariatrics not covered by insurance    Spinal cord stimulator status 08/28/2018    In back for L knee pain, Dr Kessler surgery & pain mgmt, Lyrica or Hydrocodone at night    Thyroid disease      Past Surgical History:   Procedure Laterality Date    COLONOSCOPY N/A 09/17/2021    Procedure: COLONOSCOPY;  Surgeon: Keith Mckeon MD;  Location: [facility] ([address] [place] [address]);  Service: Endoscopy;  Laterality: N/A;  prep ins. emailed / COVID screening  [place] [place]- [facility]    DILATION AND CURETTAGE OF UTERUS      ESOPHAGOGASTRODUODENOSCOPY N/A 07/27/2021    Procedure: ESOPHAGOGASTRODUODENOSCOPY (EGD);  Surgeon: Keith Mckeon MD;  Location: [facility] (4TH FLR);  Service: Endoscopy;  Laterality: N/A;  COVID test 7/24 [facility]-    GASTRIC BYPASS N/A     HYSTEROSCOPY WITH DILATION AND CURETTAGE OF UTERUS N/A 09/21/2022    Procedure: HYSTEROSCOPY, WITH DILATION AND CURETTAGE OF UTERUS;  Surgeon: Kim Juarez MD;  [facility];  Service: OB/GYN;  Laterality: N/A;    KNEE ARTHROSCOPY      L, Dasa    KNEE SURGERY      SKIN GRAFT      left finger due to injury    SPINAL CORD STIMULATOR IMPLANT      to L knee, Dr Kessler    TUBAL LIGATION         Social History     Tobacco Use    Smoking status: Never    Smokeless tobacco: Never   Substance Use Topics    Alcohol use: No    Drug use: No     Review of patient's allergies indicates:   Allergen Reactions    Nsaids (non-steroidal anti-inflammatory drug) Other (See Comments)     Stomach issues       ROS: As per HPI and below:  General:  Fever/chills, body aches, malaise  Eyes: No visual changes.  ENT:  Cough, congestion  Head:  headache.    Chest:  No shortness of breath.  Cardiovascular: No chest pain.  Abdomen: No abdominal pain.  No nausea or vomiting.  +diarrhea  Genito-Urinary: No abnormal urination.  Neurologic: No focal weakness.  No numbness.  MSK: no back pain.  Integument: No rashes or lesions.  Hematologic: No easy bruising.  Endocrine: No excessive thirst or urination.    Physical Exam:    /61 (BP Location: Left arm, Patient Position: Lying)   Pulse 100   Temp 99.7 °F (37.6 °C)   Resp 16   Ht 5' 7" (1.702 m)   Wt 98.9 kg (218 lb)   LMP 04/27/2021   SpO2 96%   Breastfeeding No   BMI 34.14 kg/m²   Vitals:    01/12/25 1743 01/12/25 1957 01/12/25 2005   BP: 139/85  136/61   Pulse: (!) 119 106 100   Resp: 18  16   Temp: (!) 102 °F (38.9 °C) 99.7 °F (37.6 °C)    TempSrc: Oral     SpO2: 95% 99% " "96%   Weight: 98.9 kg (218 lb)     Height: 5' 7" (1.702 m)         Nursing note and vital signs reviewed.  Appearance: No acute distress.  Ill-appearing but nontoxic  Eyes:  No conjunctival injection.  Extraocular muscles are intact.  ENT: Oropharynx erythema. No tonsillar exudate or swelling. Uvula midline and normal. No elevation of posterior oropharynx.  MM are pink and moist.   Bilateral TM's are pearly grey.  Lymph: No cervical lymphadenopathy.   Chest/ Respiratory: Clear to auscultation bilaterally.  Good air movement.  No wheezes.  No rhonchi. No rales. No accessory muscle use.  Cardiovascular:  Tachycardic No murmurs. No gallops. No rubs.  Musculoskeletal: Neck supple.  No meningismus.  Skin: No rashes seen.  Good turgor.  No abrasions.  No ecchymoses.  Neuro: alert and oriented x3,  no focal neurological deficits.  Psych: Appropriate, conversant    Labs that have been ordered have been independently reviewed and interpreted by myself.  Abnormal Labs Reviewed   POCT INFLUENZA A/B MOLECULAR - Abnormal; Notable for the following components:       Result Value    POC Molecular Influenza A Ag Positive (*)     All other components within normal limits       No orders to display         Initial Impression/ Differential Dx:  Differential Diagnosis includes, but is not limited to:  otitis media/external, bacterial sinusitis, allergic rhinitis, influenza, bacterial/viral pharyngitis, bacterial/viral pneumonia, covid-19, strep, URI, bronchitis      MDM:    Medical Decision Making  49-year-old female with URI symptoms began yesterday.  Febrile in the emergency department which improved with antipyretics.  This patient is showing symptoms of a viral illness or has tested positive for Influenza A or B.  I discussed with them the importance of fever control with tylenol/motrin, encouraged oral intake, and rest for the next few days until symptoms have improved.  Follow up with PCP was recommended for reeval or return to " the ED is symptoms do not improve or worsening.  Patient or caregiver verbalizes understanding.        Amount and/or Complexity of Data Reviewed  Labs:  Decision-making details documented in ED Course.    Risk  OTC drugs.  Prescription drug management.        ED Course as of 01/12/25 2031   Sun Jan 12, 2025   1900 POC Molecular Influenza A Ag(!): Positive [AS]      ED Course User Index  [AS] Blanka Brothers FNP       Diagnostic Impression:    1. Influenza A         ED Disposition Condition    Discharge Stable            ED Prescriptions       Medication Sig Dispense Start Date End Date Auth. Provider    promethazine-dextromethorphan (PROMETHAZINE-DM) 6.25-15 mg/5 mL Syrp Take 5 mLs by mouth every 4 (four) hours as needed. 118 mL 1/12/2025 1/22/2025 Blanka Brothers FNP    albuterol (VENTOLIN HFA) 90 mcg/actuation inhaler Inhale 2 puffs into the lungs every 6 (six) hours as needed for Wheezing. Rescue 18 g 1/12/2025 1/12/2026 Blanka Brothers FNP          Follow-up Information       Follow up With Specialties Details Why Contact Info    Hiwot Shah MD Family Medicine Schedule an appointment as soon as possible for a visit in 3 days  [address]  [address]  [facility] [address]  [phone]      [facility] Emergency Dept Emergency Medicine Go to  If symptoms worsen [address]  [facility] [address]  [phone]                 Blanka Brothers FNP  01/12/25 2031

## 2025-01-13 NOTE — ED TRIAGE NOTES
Rama Moy, a 49 y.o. female presents to the ED complaining of nasal congestion, cough, fever, and chills that began yesterday.    Patient is A&Ox4. Denies any other complaints. ED workup in progress. Safety measures in place; side rails up x2. Call light within pt reach. Plan of care ongoing.    Chief Complaint   Patient presents with    Cough     Cough, chills, congestion, and fever x 1 day.

## 2025-01-22 ENCOUNTER — PATIENT OUTREACH (OUTPATIENT)
Facility: OTHER | Age: 50
End: 2025-01-22
Payer: MEDICAID

## 2025-01-22 NOTE — PROGRESS NOTES
Meli Berumen  ED Navigator  Emergency Department    Project: [facility] ED Navigator  Role: Community Health Worker    Date: 01/22/2025  Patient Name: Rama Moy  MRN: [id number]  PCP: Hiwot Shah MD    Assessment:     Rama Moy is a 49 y.o. female who has presented to ED for cough/congestion, fever, body aches . Patient has visited the ED 2 times in the past 3 months. Patient did not contact PCP.     ED Navigator Initial Assessment    ED Navigator Enrollment Documentation  Consent to Services  Does patient consent to completing the assessment?: Yes  Contact  Method of Initial Contact: Phone  Transportation  Insurance Coverage  Do you have coverage/adequate coverage?: Yes  Specialist Appointment  Did the patient come to the ED to see a specialist?: No  Does the patient have a pending specialist referral?: No  Does the patient have a specialist appointment made?: No  PCP Follow Up Appointment  Medications  Is patient able to afford medication?: Yes  Psychological  Food  Communication/Education  Other Financial Concerns  Other Social Barriers/Concerns  Primary Barrier  Plan: Provided information for [facility] Nurse triage line, [phone] or [phone] ([phone])         Social History     Socioeconomic History    Marital status:    Occupational History    Occupation: works as survelliance agent   Tobacco Use    Smoking status: Never    Smokeless tobacco: Never   Substance and Sexual Activity    Alcohol use: No    Drug use: No    Sexual activity: Yes     Partners: Male     Birth control/protection: None     Social Drivers of Health     Financial Resource Strain: Low Risk  (3/7/2024)    Received from [facility]    Overall Financial Resource Strain (CARDIA)     Difficulty of Paying Living Expenses: Not hard at all   Food Insecurity: No Food Insecurity (7/2/2024)    Received from [facility]    Hunger Vital Sign     Worried About Running Out of Food in the Last Year: Never true      Ran Out of Food in the Last Year: Never true   Transportation Needs: No Transportation Needs (7/2/2024)    Received from [facility]    PRAPARE - Transportation     Lack of Transportation (Medical): No     Lack of Transportation (Non-Medical): No   Physical Activity: Sufficiently Active (3/7/2024)    Received from [facility]    Exercise Vital Sign     Days of Exercise per Week: 7 days     Minutes of Exercise per Session: 30 min   Stress: No Stress Concern Present (3/7/2024)    Received from [facility]    [employer] [place] [employer] - Occupational Stress Questionnaire     Feeling of Stress : Not at all   Housing Stability: Low Risk  (12/3/2023)    Housing Stability Vital Sign     Unable to Pay for Housing in the Last Year: No     Number of Places Lived in the Last Year: 1     Unstable Housing in the Last Year: No       Plan:   ED navigator outreached patient regarding recent emergency room visit. Assessment completed. Patient denies needing resource information at this time. Informed patient of [facility] Nurse line, patient declines needing the number at this time. Patient agrees to receiving follow up calls.

## 2025-01-22 NOTE — PROGRESS NOTES
ED navigator outreached patient regarding recent emergency room visit. Assessment completed. Patient denies needing resource information at this time. Informed patient of [facility] Nurse line, patient declines needing the number at this time. Patient agrees to receiving follow up calls.

## 2025-02-04 ENCOUNTER — HOSPITAL ENCOUNTER (OUTPATIENT)
Dept: RADIOLOGY | Facility: OTHER | Age: 50
Discharge: HOME | End: 2025-02-04
Attending: OBSTETRICS & GYNECOLOGY
Payer: MEDICAID

## 2025-02-04 DIAGNOSIS — Z12.31 VISIT FOR SCREENING MAMMOGRAM: ICD-10-CM

## 2025-02-04 PROCEDURE — 77067 SCR MAMMO BI INCL CAD: CPT | Mod: TC

## 2025-02-04 PROCEDURE — 77063 BREAST TOMOSYNTHESIS BI: CPT | Mod: 26,,, | Performed by: RADIOLOGY

## 2025-02-04 PROCEDURE — 77067 SCR MAMMO BI INCL CAD: CPT | Mod: 26,,, | Performed by: RADIOLOGY

## 2025-02-13 ENCOUNTER — OFFICE VISIT (OUTPATIENT)
Dept: OBSTETRICS AND GYNECOLOGY | Facility: CLINIC | Age: 50
End: 2025-02-13
Attending: OBSTETRICS & GYNECOLOGY
Payer: MEDICAID

## 2025-02-13 DIAGNOSIS — N95.1 SYMPTOMATIC MENOPAUSAL OR FEMALE CLIMACTERIC STATES: Primary | ICD-10-CM

## 2025-02-13 NOTE — PROGRESS NOTES
The patient location is: work  The chief complaint leading to consultation is: hormones    Visit type: audiovisual    Face to Face time with patient: 10 minutes of total time spent on the encounter, which includes face to face time and non-face to face time preparing to see the patient (eg, review of tests), Obtaining and/or reviewing separately obtained history, Documenting clinical information in the electronic or other health record, Independently interpreting results (not separately reported) and communicating results to the patient/family/caregiver, or Care coordination (not separately reported).         Each patient to whom he or she provides medical services by telemedicine is:  (1) informed of the relationship between the physician and patient and the respective role of any other health care provider with respect to management of the patient; and (2) notified that he or she may decline to receive medical services by telemedicine and may withdraw from such care at any time.    Notes: She tried the HRT but discontinued due to cramping.  Discussed some women can have some spotting in the first 6 months of starting hrt.  If so, we can increase her prometrium dose.  Offered other options for her symptoms.  She would like to try again.   She will keep me posted.    Diagnoses and all orders for this visit:    Symptomatic menopausal or female climacteric states

## 2025-02-25 ENCOUNTER — PATIENT OUTREACH (OUTPATIENT)
Facility: OTHER | Age: 50
End: 2025-02-25
Payer: MEDICAID

## 2025-02-25 NOTE — PROGRESS NOTES
ED navigator made follow up outreach to patient. Patient states she is doing well at this time. Patient denies needing any assistance at this time. Patient offered nurse line contact information, denies contact information at this time.

## 2025-03-18 ENCOUNTER — HOSPITAL ENCOUNTER (EMERGENCY)
Facility: OTHER | Age: 50
Discharge: HOME | End: 2025-03-18
Attending: STUDENT IN AN ORGANIZED HEALTH CARE EDUCATION/TRAINING PROGRAM
Payer: MEDICAID

## 2025-03-18 VITALS
TEMPERATURE: 99 F | OXYGEN SATURATION: 99 % | SYSTOLIC BLOOD PRESSURE: 125 MMHG | DIASTOLIC BLOOD PRESSURE: 75 MMHG | HEIGHT: 67 IN | RESPIRATION RATE: 18 BRPM | HEART RATE: 80 BPM | BODY MASS INDEX: 32.8 KG/M2 | WEIGHT: 209 LBS

## 2025-03-18 DIAGNOSIS — R10.33 PERIUMBILICAL ABDOMINAL PAIN: Primary | ICD-10-CM

## 2025-03-18 DIAGNOSIS — M54.50 ACUTE BILATERAL LOW BACK PAIN WITHOUT SCIATICA: ICD-10-CM

## 2025-03-18 DIAGNOSIS — E86.0 DEHYDRATION: ICD-10-CM

## 2025-03-18 LAB
ALBUMIN SERPL BCP-MCNC: 3.7 G/DL (ref 3.5–5.2)
ALP SERPL-CCNC: 73 U/L (ref 40–150)
ALT SERPL W/O P-5'-P-CCNC: 27 U/L (ref 10–44)
ANION GAP SERPL CALC-SCNC: 12 MMOL/L (ref 8–16)
AST SERPL-CCNC: 24 U/L (ref 10–40)
BACTERIA #/AREA URNS HPF: ABNORMAL /HPF
BASOPHILS # BLD AUTO: 0.04 K/UL (ref 0–0.2)
BASOPHILS NFR BLD: 0.5 % (ref 0–1.9)
BILIRUB SERPL-MCNC: 0.2 MG/DL (ref 0.1–1)
BILIRUB UR QL STRIP: NEGATIVE
BUN SERPL-MCNC: 23 MG/DL (ref 6–20)
CALCIUM SERPL-MCNC: 9.2 MG/DL (ref 8.7–10.5)
CHLORIDE SERPL-SCNC: 109 MMOL/L (ref 95–110)
CLARITY UR: ABNORMAL
CO2 SERPL-SCNC: 20 MMOL/L (ref 23–29)
COLOR UR: YELLOW
CREAT SERPL-MCNC: 0.8 MG/DL (ref 0.5–1.4)
DIFFERENTIAL METHOD BLD: NORMAL
EOSINOPHIL # BLD AUTO: 0.2 K/UL (ref 0–0.5)
EOSINOPHIL NFR BLD: 2.2 % (ref 0–8)
ERYTHROCYTE [DISTWIDTH] IN BLOOD BY AUTOMATED COUNT: 13.6 % (ref 11.5–14.5)
EST. GFR  (NO RACE VARIABLE): >60 ML/MIN/1.73 M^2
GLUCOSE SERPL-MCNC: 162 MG/DL (ref 70–110)
GLUCOSE UR QL STRIP: NEGATIVE
HCT VFR BLD AUTO: 37.2 % (ref 37–48.5)
HCV AB SERPL QL IA: NEGATIVE
HGB BLD-MCNC: 12 G/DL (ref 12–16)
HGB UR QL STRIP: NEGATIVE
HIV 1+2 AB+HIV1 P24 AG SERPL QL IA: NEGATIVE
HYALINE CASTS #/AREA URNS LPF: 1 /LPF
IMM GRANULOCYTES # BLD AUTO: 0 K/UL (ref 0–0.04)
IMM GRANULOCYTES NFR BLD AUTO: 0 % (ref 0–0.5)
KETONES UR QL STRIP: ABNORMAL
LEUKOCYTE ESTERASE UR QL STRIP: NEGATIVE
LIPASE SERPL-CCNC: 49 U/L (ref 4–60)
LYMPHOCYTES # BLD AUTO: 3.5 K/UL (ref 1–4.8)
LYMPHOCYTES NFR BLD: 47.3 % (ref 18–48)
MCH RBC QN AUTO: 28.4 PG (ref 27–31)
MCHC RBC AUTO-ENTMCNC: 32.3 G/DL (ref 32–36)
MCV RBC AUTO: 88 FL (ref 82–98)
MICROSCOPIC COMMENT: ABNORMAL
MONOCYTES # BLD AUTO: 0.4 K/UL (ref 0.3–1)
MONOCYTES NFR BLD: 6 % (ref 4–15)
NEUTROPHILS # BLD AUTO: 3.2 K/UL (ref 1.8–7.7)
NEUTROPHILS NFR BLD: 44 % (ref 38–73)
NITRITE UR QL STRIP: NEGATIVE
NRBC BLD-RTO: 0 /100 WBC
PH UR STRIP: 6 [PH] (ref 5–8)
PLATELET # BLD AUTO: 280 K/UL (ref 150–450)
PMV BLD AUTO: 10.4 FL (ref 9.2–12.9)
POTASSIUM SERPL-SCNC: 3.8 MMOL/L (ref 3.5–5.1)
PROT SERPL-MCNC: 7.2 G/DL (ref 6–8.4)
PROT UR QL STRIP: ABNORMAL
RBC # BLD AUTO: 4.23 M/UL (ref 4–5.4)
RBC #/AREA URNS HPF: 9 /HPF (ref 0–4)
SODIUM SERPL-SCNC: 141 MMOL/L (ref 136–145)
SP GR UR STRIP: >1.03 (ref 1–1.03)
SQUAMOUS #/AREA URNS HPF: 2 /HPF
URN SPEC COLLECT METH UR: ABNORMAL
UROBILINOGEN UR STRIP-ACNC: ABNORMAL EU/DL
WBC # BLD AUTO: 7.36 K/UL (ref 3.9–12.7)
WBC #/AREA URNS HPF: 4 /HPF (ref 0–5)

## 2025-03-18 PROCEDURE — 81000 URINALYSIS NONAUTO W/SCOPE: CPT | Performed by: EMERGENCY MEDICINE

## 2025-03-18 PROCEDURE — 83690 ASSAY OF LIPASE: CPT | Performed by: NURSE PRACTITIONER

## 2025-03-18 PROCEDURE — 96360 HYDRATION IV INFUSION INIT: CPT

## 2025-03-18 PROCEDURE — 25000003 PHARM REV CODE 250

## 2025-03-18 PROCEDURE — 86803 HEPATITIS C AB TEST: CPT | Performed by: EMERGENCY MEDICINE

## 2025-03-18 PROCEDURE — 99284 EMERGENCY DEPT VISIT MOD MDM: CPT | Mod: 25

## 2025-03-18 PROCEDURE — 87389 HIV-1 AG W/HIV-1&-2 AB AG IA: CPT | Performed by: EMERGENCY MEDICINE

## 2025-03-18 PROCEDURE — 80053 COMPREHEN METABOLIC PANEL: CPT | Performed by: NURSE PRACTITIONER

## 2025-03-18 PROCEDURE — 85025 COMPLETE CBC W/AUTO DIFF WBC: CPT | Performed by: NURSE PRACTITIONER

## 2025-03-18 RX ORDER — LIDOCAINE 50 MG/G
2 PATCH TOPICAL
Status: DISCONTINUED | OUTPATIENT
Start: 2025-03-18 | End: 2025-03-18 | Stop reason: HOSPADM

## 2025-03-18 RX ORDER — LIDOCAINE 50 MG/G
2 PATCH TOPICAL DAILY
Qty: 10 PATCH | Refills: 0 | Status: SHIPPED | OUTPATIENT
Start: 2025-03-18 | End: 2025-03-23

## 2025-03-18 RX ORDER — ORPHENADRINE CITRATE 100 MG/1
100 TABLET, EXTENDED RELEASE ORAL
Status: COMPLETED | OUTPATIENT
Start: 2025-03-18 | End: 2025-03-18

## 2025-03-18 RX ORDER — ALUMINUM HYDROXIDE, MAGNESIUM HYDROXIDE, AND SIMETHICONE 1200; 120; 1200 MG/30ML; MG/30ML; MG/30ML
30 SUSPENSION ORAL EVERY 6 HOURS PRN
Qty: 354 ML | Refills: 0 | Status: SHIPPED | OUTPATIENT
Start: 2025-03-18 | End: 2025-03-23

## 2025-03-18 RX ORDER — ACETAMINOPHEN 325 MG/1
650 TABLET ORAL
Status: COMPLETED | OUTPATIENT
Start: 2025-03-18 | End: 2025-03-18

## 2025-03-18 RX ORDER — MORPHINE SULFATE 4 MG/ML
4 INJECTION, SOLUTION INTRAMUSCULAR; INTRAVENOUS
Refills: 0 | Status: DISCONTINUED | OUTPATIENT
Start: 2025-03-18 | End: 2025-03-18

## 2025-03-18 RX ORDER — METHOCARBAMOL 500 MG/1
500 TABLET, FILM COATED ORAL 3 TIMES DAILY PRN
Qty: 15 TABLET | Refills: 0 | Status: SHIPPED | OUTPATIENT
Start: 2025-03-18 | End: 2025-03-23

## 2025-03-18 RX ORDER — DICLOFENAC SODIUM 10 MG/G
2 GEL TOPICAL 2 TIMES DAILY
Qty: 100 G | Refills: 0 | Status: SHIPPED | OUTPATIENT
Start: 2025-03-18 | End: 2025-03-23

## 2025-03-18 RX ADMIN — ACETAMINOPHEN 650 MG: 325 TABLET, FILM COATED ORAL at 05:03

## 2025-03-18 RX ADMIN — SODIUM CHLORIDE 1000 ML: 0.9 INJECTION, SOLUTION INTRAVENOUS at 05:03

## 2025-03-18 RX ADMIN — ORPHENADRINE CITRATE 100 MG: 100 TABLET, EXTENDED RELEASE ORAL at 05:03

## 2025-03-18 RX ADMIN — LIDOCAINE 2 PATCH: 50 PATCH CUTANEOUS at 06:03

## 2025-03-18 NOTE — ED TRIAGE NOTES
Pt reports the back pain for the past three weeks when she was diagnosed with a kidney stone. Pt states the pain is to her lower back mostly her right side. She is having urgency and frequency. She denies dysuria

## 2025-03-18 NOTE — DISCHARGE INSTRUCTIONS
You can take Tylenol 650 mg and Robaxin 500 mg every 6 hours as needed for back pain. You can apply Voltaren gel or lidocaine patches to your back. You can take Tylenol or Maalox as needed for abdominal pain. Keep a bland diet for the next few days. Make sure you are staying hydrated with water or fluids containing electrolytes.    Follow up with your gastric bypass surgeon for persistent or worsening abdominal pain.

## 2025-03-18 NOTE — FIRST PROVIDER EVALUATION
Emergency Department TeleTriage Encounter Note      CHIEF COMPLAINT    Chief Complaint   Patient presents with    Abdominal Pain     Pt c/o periumbilical abdominal pain and lower back pain on both sides for about 1 week. Pt denying fever, trauma, n/v/d, dysuria. In no distress at triage.    Back Pain       VITAL SIGNS   Initial Vitals [03/18/25 1448]   BP Pulse Resp Temp SpO2   122/75 88 19 98.5 °F (36.9 °C) 100 %      MAP       --            ALLERGIES    Review of patient's allergies indicates:   Allergen Reactions    Nsaids (non-steroidal anti-inflammatory drug) Other (See Comments)     Stomach issues       PROVIDER TRIAGE NOTE  This is a teletriage evaluation of a 49 y.o. female presenting to the ED complaining of periumbilical abd pain, back pain, and light-headedness for about a week. No trauma. Associated constipation but is able to pass gas. No vomiting or fever.     Alert, no distress.     Initial orders will be placed and care will be transferred to an alternate provider when patient is roomed for a full evaluation. Any additional orders and the final disposition will be determined by that provider.         ORDERS  Labs Reviewed   URINALYSIS, REFLEX TO URINE CULTURE - Abnormal       Result Value    Specimen UA Urine, Clean Catch      Color, UA Yellow      Appearance, UA Hazy (*)     pH, UA 6.0      Specific Gravity, UA >1.030 (*)     Protein, UA 1+ (*)     Glucose, UA Negative      Ketones, UA Trace (*)     Bilirubin (UA) Negative      Occult Blood UA Negative      Nitrite, UA Negative      Urobilinogen, UA 2.0-3.0 (*)     Leukocytes, UA Negative      Narrative:     Specimen Source->Urine   URINALYSIS MICROSCOPIC - Abnormal    RBC, UA 9 (*)     WBC, UA 4      Bacteria Rare      Squam Epithel, UA 2      Hyaline Casts, UA 1      Microscopic Comment SEE COMMENT      Narrative:     Specimen Source->Urine   HEPATITIS C ANTIBODY   HIV 1 / 2 ANTIBODY       ED Orders (720h ago, onward)      Start Ordered      Status Ordering Provider    03/18/25 1541 03/18/25 1540  Vital signs  Every 2 hours         Ordered JENNIFER GASCA N.    03/18/25 1540 03/18/25 1540  Diet NPO  Diet effective now         Ordered JENNIFER GASCA N.    03/18/25 1452 03/18/25 1451  Urinalysis, Reflex to Urine Culture Urine, Clean Catch  STAT         Final result DELIO VAIL.    03/18/25 1451 03/18/25 1451  Hepatitis C Antibody  STAT         Ordered DELIO VAIL    03/18/25 1451 03/18/25 1451  HIV 1/2 Ag/Ab (4th Gen)  STAT         Ordered DELIO VAIL    03/18/25 1451 03/18/25 1451  Urinalysis Microscopic  Once         Final result DLEIO VAIL    Unscheduled 03/18/25 1540  Insert peripheral IV  Once         Ordered JENNIFER GASCA    Unscheduled 03/18/25 1540  CBC W/ AUTO DIFFERENTIAL  STAT         Ordered JENNIFER GASCA    Unscheduled 03/18/25 1540  Comp. Metabolic Panel  STAT         Ordered JENNIFER GASCA    Unscheduled 03/18/25 1540  Lipase  STAT         Ordered JENNIFER GASCA    Unscheduled 03/18/25 1540  Urinalysis, Reflex to Urine Culture Urine, Clean Catch  STAT         Ordered JENNIFER GASCA              Virtual Visit Note: The provider triage portion of this emergency department evaluation and documentation was performed via [employer], a HIPAA-compliant telemedicine application, in concert with a tele-presenter in the room. A face to face patient evaluation with one of my colleagues will occur once the patient is placed in an emergency department room.      DISCLAIMER: This note was prepared with [employer] voice recognition transcription software. Garbled syntax, mangled pronouns, and other bizarre constructions may be attributed to that software system.

## 2025-03-18 NOTE — ED PROVIDER NOTES
"Encounter Date: 3/18/2025       History     Chief Complaint   Patient presents with    Abdominal Pain     Pt c/o periumbilical abdominal pain and lower back pain on both sides for about 1 week. Pt denying fever, trauma, n/v/d, dysuria. In no distress at triage.    Back Pain     Rama Moy is a 49 y.o. female with history of gastric bypass in 2024 presenting to the emergency department for evaluation of intermittent periumbilical pain for 2 weeks. She describes the pain as "sharp." Reports associated irritation to "navel" and "white" drainage from the umbilicus 2 days ago. She reports associated atraumatic, bilateral lower back pain and right thoracic back pain. She denies recent falls, trauma to the back, heavy lifting or exercising. She has not taken any medications for her pain. Patient notes that she had a "stimulator" removed from her lower back last year for knee pain. She denies fever, chills, nausea, vomiting, diarrhea, constipation, blood in stool, dysuria, hematuria, urinary frequency, urinary urgency, vaginal bleeding or vaginal discharge.       The history is provided by the patient.     Review of patient's allergies indicates:   Allergen Reactions    Nsaids (non-steroidal anti-inflammatory drug) Other (See Comments)     Stomach issues     Past Medical History:   Diagnosis Date    Amenorrhea July 9    Its been a year without a cycle and July 9 and August 6 i have seen a period    Anemia, unspecified     Constipation     GI Dr Reece 2014    COVID-19     Elevated platelet count 03/11/2021    Refer hematol 3/2021    Familial hypercholesteremia     per digital lipid    Gastritis 07/30/2021 2021    Gastroesophageal reflux disease without esophagitis 08/28/2018    She states she had EGD in past    Grief 09/19/2017    mom passed 2017    HTN (hypertension), benign 03/10/2015    Digital med 12/20    Intestinal erosis 09/17/2021    CS 2021    Iron deficiency anemia secondary to inadequate dietary iron " intake 08/26/2021    Start OTC 8/21    Knee pain, left 2012    scope, Dr Reyes, injury at work, treadmill helps with stiffness    Low blood potassium 07/30/2021    avoid HCTZ    Mixed hyperlipidemia 08/29/2013    Morbid obesity 02/23/2015    bariatrics not covered by insurance    Spinal cord stimulator status 08/28/2018    In back for L knee pain, Dr Kessler surgery & pain mgmt, Lyrica or Hydrocodone at night    Thyroid disease      Past Surgical History:   Procedure Laterality Date    COLONOSCOPY N/A 09/17/2021    Procedure: COLONOSCOPY;  Surgeon: Keith Mckeon MD;  Location: [facility] ENDO (4TH FLR);  Service: Endoscopy;  Laterality: N/A;  prep ins. emailed / COVID screening [facility] [place] 2nd floor 9/14/21- ERW    DILATION AND CURETTAGE OF UTERUS      ESOPHAGOGASTRODUODENOSCOPY N/A 07/27/2021    Procedure: ESOPHAGOGASTRODUODENOSCOPY (EGD);  Surgeon: Keith Mckeon MD;  Location: [facility] (4TH FLR);  Service: Endoscopy;  Laterality: N/A;  COVID test 7/24 [facility]-    GASTRIC BYPASS N/A     HYSTEROSCOPY WITH DILATION AND CURETTAGE OF UTERUS N/A 09/21/2022    Procedure: HYSTEROSCOPY, WITH DILATION AND CURETTAGE OF UTERUS;  Surgeon: Kim Juarez MD;  Location: [facility];  Service: OB/GYN;  Laterality: N/A;    KNEE ARTHROSCOPY      Teddy MOHAMUDa    KNEE SURGERY      SKIN GRAFT      left finger due to injury    SPINAL CORD STIMULATOR IMPLANT      to L knee, Dr Kessler    TUBAL LIGATION       Family History   Problem Relation Name Age of Onset    Hypertension Mother Margaret     Diabetes Mother Margaret     COPD Mother Margaret     Breast cancer Neg Hx      Colon cancer Neg Hx      Ovarian cancer Neg Hx       Social History[1]    Review of Systems  As per HPI.     Physical Exam     Initial Vitals [03/18/25 1448]   BP Pulse Resp Temp SpO2   122/75 88 19 98.5 °F (36.9 °C) 100 %      MAP       --         Physical Exam    Nursing note and vitals reviewed.  Constitutional: She appears well-developed and well-nourished. No distress.   HENT:   Head:  Normocephalic and atraumatic.   Nose: Nose normal. Mouth/Throat: Oropharynx is clear and moist.   Eyes: Conjunctivae and EOM are normal.   Neck: Neck supple.   Normal range of motion.  Cardiovascular:  Normal rate, regular rhythm, normal heart sounds and intact distal pulses.           Pulmonary/Chest: Breath sounds normal. No respiratory distress. She has no wheezes. She has no rhonchi. She has no rales.   Abdominal: Abdomen is soft. Bowel sounds are normal. She exhibits no distension.   No focal abdominal or pelvic ttp. No distension. Abdomen soft. No drainage from umbilicus. No overlying skin changes.  There is no rebound and no guarding.   Musculoskeletal:         General: Normal range of motion.      Cervical back: Normal range of motion and neck supple.      Comments: No midline ttp of cervical, thoracic or lumbar spine.      Neurological: She is alert and oriented to person, place, and time. She has normal strength.   Skin: Skin is warm and dry.   Psychiatric: She has a normal mood and affect. Her behavior is normal. Judgment and thought content normal.         ED Course   Procedures  Labs Reviewed   URINALYSIS, REFLEX TO URINE CULTURE - Abnormal       Result Value    Specimen UA Urine, Clean Catch      Color, UA Yellow      Appearance, UA Hazy (*)     pH, UA 6.0      Specific Gravity, UA >1.030 (*)     Protein, UA 1+ (*)     Glucose, UA Negative      Ketones, UA Trace (*)     Bilirubin (UA) Negative      Occult Blood UA Negative      Nitrite, UA Negative      Urobilinogen, UA 2.0-3.0 (*)     Leukocytes, UA Negative      Narrative:     Specimen Source->Urine   URINALYSIS MICROSCOPIC - Abnormal    RBC, UA 9 (*)     WBC, UA 4      Bacteria Rare      Squam Epithel, UA 2      Hyaline Casts, UA 1      Microscopic Comment SEE COMMENT      Narrative:     Specimen Source->Urine   COMPREHENSIVE METABOLIC PANEL - Abnormal    Sodium 141      Potassium 3.8      Chloride 109      CO2 20 (*)     Glucose 162 (*)     BUN 23  "(*)     Creatinine 0.8      Calcium 9.2      Total Protein 7.2      Albumin 3.7      Total Bilirubin 0.2      Alkaline Phosphatase 73      AST 24      ALT 27      eGFR >60      Anion Gap 12     HEPATITIS C ANTIBODY    Hepatitis C Ab Negative      Narrative:     Release to patient->Immediate   HIV 1 / 2 ANTIBODY    HIV 1/2 Ag/Ab Negative      Narrative:     Release to patient->Immediate   CBC W/ AUTO DIFFERENTIAL    WBC 7.36      RBC 4.23      Hemoglobin 12.0      Hematocrit 37.2      MCV 88      MCH 28.4      MCHC 32.3      RDW 13.6      Platelets 280      MPV 10.4      Immature Granulocytes 0.0      Gran # (ANC) 3.2      Immature Grans (Abs) 0.00      Lymph # 3.5      Mono # 0.4      Eos # 0.2      Baso # 0.04      nRBC 0      Gran % 44.0      Lymph % 47.3      Mono % 6.0      Eosinophil % 2.2      Basophil % 0.5      Differential Method Automated     LIPASE    Lipase 49            Imaging Results    None          Medications   sodium chloride 0.9% bolus 1,000 mL 1,000 mL (0 mLs Intravenous Stopped 3/18/25 1819)   acetaminophen tablet 650 mg (650 mg Oral Given 3/18/25 1731)   orphenadrine 12 hr tablet 100 mg (100 mg Oral Given 3/18/25 1731)     Medical Decision Making  Risk  OTC drugs.  Prescription drug management.                          Medical Decision Making:   Initial Assessment:   Urgent evaluation of 49-year-old female with history of gastric bypass in 2024 presenting to the emergency department for evaluation of intermittent periumbilical pain for 2 weeks. Reports associated irritation to "navel" and "white" drainage from the umbilicus 2 days ago. She reports associated atraumatic, bilateral lower back pain and right thoracic back pain.  She has not taken any medication for her pain.  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  On exam, she is uncomfortable appearing but nontoxic.  Hemodynamically stable.  Afebrile in the ED. no focal abdominal or pelvic tenderness to palpation.  No distention.  " Abdomen is soft.  No drainage from umbilicus.  Unable to express any drainage.  No midline tenderness to palpation of cervical, thoracic, or lumbar spine.  No crepitus, step-offs, deformities.  Plan for labs.  Will give IV fluids, Tylenol and muscle relaxer.  Patient states that she is unable to take NSAIDs due to gastric bypass.  Clinical Tests:   Lab Tests: Ordered and Reviewed  ED Management:  On review of labs, no leukocytosis.  H&H within normal limits.  Normal platelet count.  On CMP, serum glucose of 162.  BUN slightly elevated.  Normal creatinine and EGFR.  No electrolyte derangement.  Normal liver function.  Normal anion gap.  Lipase is within normal limits.  Screening HIV and hep C tests are both negative.  UA not consistent with UTI.  There are signs of dehydration on UA.  I updated the patient on all results.  Suspect lumbar strain.  Unknown etiology of her abdominal pain.  Will trial course of Robaxin,  Voltaren gel, and lidocaine patches for back pain.  Will trial course of Tylenol and Maalox for abdominal pain.  Advised her to keep a bland diet for the next few days and to stay hydrated with water or fluids containing electrolytes.  Recommended follow-up with her gastric bypass surgeon if she continues to experience persistent or worsening abdominal pain.  Patient verbalized understanding and agreement with this plan of care. She was given specific return precautions to the ED. Advised to follow up with PCP as needed. All questions and concerns addressed. She is stable for discharge.     This note was created with MModal Fluency Direct Dictation. Please excuse any spelling or grammatical errors.             Clinical Impression:  Final diagnoses:  [R10.33] Periumbilical abdominal pain (Primary)  [M54.50] Acute bilateral low back pain without sciatica  [E86.0] Dehydration          ED Disposition Condition    Discharge Stable          ED Prescriptions       Medication Sig Dispense Start Date End Date  Auth. Provider    methocarbamoL (ROBAXIN) 500 MG Tab Take 1 tablet (500 mg total) by mouth 3 (three) times daily as needed (back pain). 15 tablet 3/18/2025 3/23/2025 Ron Bates PA-C    LIDOcaine (LIDODERM) 5 % Place 2 patches onto the skin once daily. Remove & Discard patch within 12 hours or as directed by MD for 5 days 10 patch 3/18/2025 3/23/2025 Ron Bates PA-C    aluminum-magnesium hydroxide-simethicone (MAALOX) 200-200-20 mg/5 mL Susp Take 30 mLs by mouth every 6 (six) hours as needed (abdominal pain). 354 mL 3/18/2025 3/23/2025 Ron Bates PA-C    diclofenac sodium (VOLTAREN) 1 % Gel Apply 2 g topically 2 (two) times daily. for 5 days 100 g 3/18/2025 3/23/2025 Ron Bates PA-C          Follow-up Information    None              [1]   Social History  Tobacco Use    Smoking status: Never    Smokeless tobacco: Never   Substance Use Topics    Alcohol use: No    Drug use: No        Ron Bates PA-C  03/18/25 2124

## 2025-03-26 ENCOUNTER — PATIENT OUTREACH (OUTPATIENT)
Facility: OTHER | Age: 50
End: 2025-03-26
Payer: MEDICAID

## 2025-04-02 NOTE — PROGRESS NOTES
ED Navigator made follow up outreach to patient. Patient had an ER visit on 3/18/2025. Patient states she is feeling better. Patient denies needing any assistance at this time.

## 2025-04-08 LAB — NONINV COLON CA DNA+OCC BLD SCRN STL QL: NEGATIVE

## 2025-04-16 ENCOUNTER — PATIENT OUTREACH (OUTPATIENT)
Dept: ADMINISTRATIVE | Facility: HOSPITAL | Age: 50
End: 2025-04-16
Payer: MEDICAID

## 2025-04-17 ENCOUNTER — RESULTS FOLLOW-UP (OUTPATIENT)
Dept: PRIMARY CARE CLINIC | Facility: CLINIC | Age: 50
End: 2025-04-17

## 2025-04-17 NOTE — PROGRESS NOTES
Good news!    Your COLON CANCER SCREENING stool test shows NO BLOOD . We'll repeat this periodically        
15-Dec-2019

## 2025-04-21 ENCOUNTER — PATIENT MESSAGE (OUTPATIENT)
Dept: OBSTETRICS AND GYNECOLOGY | Facility: CLINIC | Age: 50
End: 2025-04-21
Payer: MEDICAID

## 2025-04-21 ENCOUNTER — HOSPITAL ENCOUNTER (EMERGENCY)
Facility: OTHER | Age: 50
Discharge: HOME | End: 2025-04-21
Attending: EMERGENCY MEDICINE
Payer: MEDICAID

## 2025-04-21 VITALS
TEMPERATURE: 98 F | HEIGHT: 67 IN | HEART RATE: 69 BPM | OXYGEN SATURATION: 96 % | SYSTOLIC BLOOD PRESSURE: 132 MMHG | BODY MASS INDEX: 31.55 KG/M2 | DIASTOLIC BLOOD PRESSURE: 80 MMHG | RESPIRATION RATE: 16 BRPM | WEIGHT: 201 LBS

## 2025-04-21 DIAGNOSIS — R10.31 RLQ ABDOMINAL PAIN: ICD-10-CM

## 2025-04-21 DIAGNOSIS — R10.31 RIGHT GROIN PAIN: Primary | ICD-10-CM

## 2025-04-21 LAB
ABSOLUTE EOSINOPHIL (OHS): 0.14 K/UL
ABSOLUTE MONOCYTE (OHS): 0.54 K/UL (ref 0.3–1)
ABSOLUTE NEUTROPHIL COUNT (OHS): 3.01 K/UL (ref 1.8–7.7)
ALBUMIN SERPL BCP-MCNC: 3.6 G/DL (ref 3.5–5.2)
ALP SERPL-CCNC: 64 UNIT/L (ref 40–150)
ALT SERPL W/O P-5'-P-CCNC: 22 UNIT/L (ref 10–44)
ANION GAP (OHS): 10 MMOL/L (ref 8–16)
AST SERPL-CCNC: 26 UNIT/L (ref 11–45)
B-HCG UR QL: NEGATIVE
BASOPHILS # BLD AUTO: 0.04 K/UL
BASOPHILS NFR BLD AUTO: 0.6 %
BILIRUB SERPL-MCNC: 0.5 MG/DL (ref 0.1–1)
BILIRUB UR QL STRIP.AUTO: NEGATIVE
BUN SERPL-MCNC: 18 MG/DL (ref 6–20)
CALCIUM SERPL-MCNC: 9.1 MG/DL (ref 8.7–10.5)
CHLORIDE SERPL-SCNC: 109 MMOL/L (ref 95–110)
CLARITY UR: CLEAR
CO2 SERPL-SCNC: 21 MMOL/L (ref 23–29)
COLOR UR AUTO: COLORLESS
CREAT SERPL-MCNC: 0.7 MG/DL (ref 0.5–1.4)
CREAT SERPL-MCNC: 0.8 MG/DL (ref 0.5–1.4)
CTP QC/QA: YES
ERYTHROCYTE [DISTWIDTH] IN BLOOD BY AUTOMATED COUNT: 13.7 % (ref 11.5–14.5)
GFR SERPLBLD CREATININE-BSD FMLA CKD-EPI: >60 ML/MIN/1.73/M2
GLUCOSE SERPL-MCNC: 80 MG/DL (ref 70–110)
GLUCOSE UR QL STRIP: NEGATIVE
HCT VFR BLD AUTO: 38.4 % (ref 37–48.5)
HGB BLD-MCNC: 12 GM/DL (ref 12–16)
HGB UR QL STRIP: ABNORMAL
HOLD SPECIMEN: NORMAL
IMM GRANULOCYTES # BLD AUTO: 0.01 K/UL (ref 0–0.04)
IMM GRANULOCYTES NFR BLD AUTO: 0.1 % (ref 0–0.5)
KETONES UR QL STRIP: ABNORMAL
LEUKOCYTE ESTERASE UR QL STRIP: NEGATIVE
LYMPHOCYTES # BLD AUTO: 3.42 K/UL (ref 1–4.8)
MCH RBC QN AUTO: 27.9 PG (ref 27–31)
MCHC RBC AUTO-ENTMCNC: 31.3 G/DL (ref 32–36)
MCV RBC AUTO: 89 FL (ref 82–98)
NITRITE UR QL STRIP: NEGATIVE
NUCLEATED RBC (/100WBC) (OHS): 0 /100 WBC
PH UR STRIP: 6 [PH]
PLATELET # BLD AUTO: 262 K/UL (ref 150–450)
PMV BLD AUTO: 10.1 FL (ref 9.2–12.9)
POTASSIUM SERPL-SCNC: 4 MMOL/L (ref 3.5–5.1)
PROT SERPL-MCNC: 7.1 GM/DL (ref 6–8.4)
PROT UR QL STRIP: NEGATIVE
RBC # BLD AUTO: 4.3 M/UL (ref 4–5.4)
RELATIVE EOSINOPHIL (OHS): 2 %
RELATIVE LYMPHOCYTE (OHS): 47.8 % (ref 18–48)
RELATIVE MONOCYTE (OHS): 7.5 % (ref 4–15)
RELATIVE NEUTROPHIL (OHS): 42 % (ref 38–73)
SAMPLE: NORMAL
SODIUM SERPL-SCNC: 140 MMOL/L (ref 136–145)
SP GR UR STRIP: >=1.03
UROBILINOGEN UR STRIP-ACNC: NEGATIVE EU/DL
WBC # BLD AUTO: 7.16 K/UL (ref 3.9–12.7)

## 2025-04-21 PROCEDURE — 85025 COMPLETE CBC W/AUTO DIFF WBC: CPT

## 2025-04-21 PROCEDURE — 99285 EMERGENCY DEPT VISIT HI MDM: CPT | Mod: 25

## 2025-04-21 PROCEDURE — 80053 COMPREHEN METABOLIC PANEL: CPT

## 2025-04-21 PROCEDURE — 25500020 PHARM REV CODE 255

## 2025-04-21 PROCEDURE — 81025 URINE PREGNANCY TEST: CPT

## 2025-04-21 PROCEDURE — 81003 URINALYSIS AUTO W/O SCOPE: CPT

## 2025-04-21 RX ORDER — ACETAMINOPHEN 500 MG
1000 TABLET ORAL
Status: DISCONTINUED | OUTPATIENT
Start: 2025-04-21 | End: 2025-04-21 | Stop reason: HOSPADM

## 2025-04-21 RX ORDER — METHOCARBAMOL 500 MG/1
500 TABLET, FILM COATED ORAL 3 TIMES DAILY
Qty: 15 TABLET | Refills: 0 | Status: SHIPPED | OUTPATIENT
Start: 2025-04-21 | End: 2025-04-26

## 2025-04-21 RX ADMIN — IOHEXOL 90 ML: 350 INJECTION, SOLUTION INTRAVENOUS at 11:04

## 2025-04-21 NOTE — DISCHARGE INSTRUCTIONS
Please follow up with your PCP and OBGYN    I have sent you a prescription for Robaxin which is a muscle relaxer.  This medication may cause drowsiness so please do not operate heavy machinery while taking this medication.    I recommend taking 1000 mg Tylenol up to 3 times a day.

## 2025-04-21 NOTE — ED TRIAGE NOTES
"Pt reports constant non traumatic  R sided groin pain x 2 days, denies any urinary s/s. Pt reports hx of hernia but "not sure where".  "

## 2025-04-21 NOTE — ED PROVIDER NOTES
"Encounter Date: 4/21/2025       History     Chief Complaint   Patient presents with    Groin Pain     Pt reports constant R groin pain x 2 days, denies any urinary s/s. Pt reports hx of hernia but "not sure where"     49-year-old female with history of GERD, gastritis,NANCY, hyperlipidemia, hypertension presents for right lower quadrant pain x2 days.  Patient denies any trauma or injury.  Patient denies any nausea, vomiting, diarrhea.  Patient states she has tried Tylenol with minimal symptom relief.  Patient states the pain is worse when standing up or with movement.  Patient states the pain is better with rest.  Patient states she was told she had an abdominal hernia at some point but was never told where min never had any issues with this.  States history of gastric sleeve but no other abdominal surgeries.  States her tubes are tied.  Patient denies use of blood thinners and states her last meal was yesterday.  Patient denies any subjective fever or chills.  This is the extent of the patient's complaint at this time.    The history is provided by the patient.     Review of patient's allergies indicates:   Allergen Reactions    Nsaids (non-steroidal anti-inflammatory drug) Other (See Comments)     Stomach issues     Past Medical History:   Diagnosis Date    Amenorrhea July 9    Its been a year without a cycle and July 9 and August 6 i have seen a period    Anemia, unspecified     Constipation     GI Dr Reece 2014    COVID-19     Elevated platelet count 03/11/2021    Refer hematol 3/2021    Familial hypercholesteremia     per digital lipid    Gastritis 07/30/2021 2021    Gastroesophageal reflux disease without esophagitis 08/28/2018    She states she had EGD in past    Grief 09/19/2017    mom passed 2017    HTN (hypertension), benign 03/10/2015    Digital med 12/20    Intestinal erosis 09/17/2021    CS 2021    Iron deficiency anemia secondary to inadequate dietary iron intake 08/26/2021    Start OTC 8/21    Knee " pain, left 2012    scope, Dr Reyes, injury at work, treadmill helps with stiffness    Low blood potassium 07/30/2021    avoid HCTZ    Mixed hyperlipidemia 08/29/2013    Morbid obesity 02/23/2015    bariatrics not covered by insurance    Spinal cord stimulator status 08/28/2018    In back for L knee pain, Dr Kessler surgery & pain mgmt, Lyrica or Hydrocodone at night    Thyroid disease      Past Surgical History:   Procedure Laterality Date    COLONOSCOPY N/A 09/17/2021    Procedure: COLONOSCOPY;  Surgeon: Keith Mckeon MD;  Location: [facility] (4TH FLR);  Service: Endoscopy;  Laterality: N/A;  prep ins. emailed / COVID screening [facility] 2nd floor 9/14/21- ERW    DILATION AND CURETTAGE OF UTERUS      ESOPHAGOGASTRODUODENOSCOPY N/A 07/27/2021    Procedure: ESOPHAGOGASTRODUODENOSCOPY (EGD);  Surgeon: Keith Mckeon MD;  Location: [facility] (4TH FLR);  Service: Endoscopy;  Laterality: N/A;  COVID test 7/24 [facility]-    GASTRIC BYPASS N/A     HYSTEROSCOPY WITH DILATION AND CURETTAGE OF UTERUS N/A 09/21/2022    Procedure: HYSTEROSCOPY, WITH DILATION AND CURETTAGE OF UTERUS;  Surgeon: Kim Juarez MD;  Location: [facility];  Service: OB/GYN;  Laterality: N/A;    KNEE ARTHROSCOPY      Eric MOHAMUD    KNEE SURGERY      SKIN GRAFT      left finger due to injury    SPINAL CORD STIMULATOR IMPLANT      to L knee, Dr Kessler    TUBAL LIGATION       Family History   Problem Relation Name Age of Onset    Hypertension Mother Margaret     Diabetes Mother Margaret     COPD Mother Margaret     Breast cancer Neg Hx      Colon cancer Neg Hx      Ovarian cancer Neg Hx       Social History[1]  Review of Systems  As per hpi  Physical Exam     Initial Vitals [04/21/25 0933]   BP Pulse Resp Temp SpO2   121/76 80 20 98.1 °F (36.7 °C) 98 %      MAP       --         Physical Exam    Nursing note and vitals reviewed.  Constitutional: Vital signs are normal. She appears well-developed and well-nourished. She is cooperative.   HENT:   Head: Normocephalic and  atraumatic.   Eyes: Conjunctivae and lids are normal.   Neck: Trachea normal. No thyroid mass present.   Cardiovascular:  Normal rate and regular rhythm.           Pulmonary/Chest: No respiratory distress.   Abdominal: Abdomen is soft. There is abdominal tenderness in the right lower quadrant.     Neurological: She is alert and oriented to person, place, and time. GCS eye subscore is 4. GCS verbal subscore is 5. GCS motor subscore is 6.   Skin: Skin is warm, dry and intact. No rash noted.   Psychiatric: She has a normal mood and affect. Her speech is normal and behavior is normal. Thought content normal.         ED Course   Procedures  Labs Reviewed   COMPREHENSIVE METABOLIC PANEL - Abnormal       Result Value    Sodium 140      Potassium 4.0      Chloride 109      CO2 21 (*)     Glucose 80      BUN 18      Creatinine 0.7      Calcium 9.1      Protein Total 7.1      Albumin 3.6      Bilirubin Total 0.5      ALP 64      AST 26      ALT 22      Anion Gap 10      eGFR >60     CBC WITH DIFFERENTIAL - Abnormal    WBC 7.16      RBC 4.30      HGB 12.0      HCT 38.4      MCV 89      MCH 27.9      MCHC 31.3 (*)     RDW 13.7      Platelet Count 262      MPV 10.1      Nucleated RBC 0      Neut % 42.0      Lymph % 47.8      Mono % 7.5      Eos % 2.0      Basophil % 0.6      Imm Grans % 0.1      Neut # 3.01      Lymph # 3.42      Mono # 0.54      Eos # 0.14      Baso # 0.04      Imm Grans # 0.01     URINALYSIS, REFLEX TO URINE CULTURE - Abnormal    Color, UA Colorless (*)     Appearance, UA Clear      pH, UA 6.0      Spec Grav UA >=1.030 (*)     Protein, UA Negative      Glucose, UA Negative      Ketones, UA 1+ (*)     Bilirubin, UA Negative      Blood, UA Trace (*)     Nitrites, UA Negative      Urobilinogen, UA Negative      Leukocyte Esterase, UA Negative     POCT URINE PREGNANCY - Normal    POC Preg Test, Ur Negative       Acceptable Yes     CBC W/ AUTO DIFFERENTIAL    Narrative:     The following orders were  created for panel order CBC auto differential.  Procedure                               Abnormality         Status                     ---------                               -----------         ------                     CBC with Differential[[id number]]       Abnormal            Final result                 Please view results for these tests on the individual orders.   GREY TOP URINE HOLD   ISTAT CREATININE    POC Creatinine 0.8      Sample VENOUS            Imaging Results              US Pelvis Comp with Transvag NON-OB (xpd (Final result)  Result time 04/21/25 13:42:38      Final result by Braydon Morley III, MD (04/21/25 13:42:38)                   Impression:      Two small uterine leiomyomas.  No acute process seen.      Electronically signed by: Braydon Morley MD  Date:    04/21/2025  Time:    13:42               Narrative:    EXAMINATION:  US PELVIS COMP WITH TRANSVAG NON-OB (XPD)    CLINICAL HISTORY:  rlq pain;    FINDINGS:  Comparison is 09/17/2022.    Uterus measures 7.1 x 3.6 x 4.5 cm, endometrium 3 mm.    Right ovary measures 2.2 x 1.3 x 1.6 cm, RI 0.68.    Left ovary measures 2.2 x 1.2 x 1.7 cm, RI 0.60.    There are 2 fibroids measuring 1.4, and 0.9 cm.                                       CT Abdomen Pelvis With IV Contrast NO Oral Contrast (Final result)  Result time 04/21/25 12:03:54      Final result by Braydon Morley III, MD (04/21/25 12:03:54)                   Impression:      No acute process seen.    The right flank collection has completely resolved with a linear area of inflammation.    Small pericardial effusion.    Small liver cyst.    Left upper pole renal lesion most likely a cyst.    Small hiatal hernia.    Right lower quadrant is unremarkable.      Electronically signed by: Braydon Morley MD  Date:    04/21/2025  Time:    12:03               Narrative:    EXAMINATION:  CT ABDOMEN PELVIS WITH IV CONTRAST    CLINICAL HISTORY:  RLQ abdominal pain (Age >=  14y);    FINDINGS:  Comparison is CT renal stone study dated 04/26/2024.    Patient was administered 100 cc of Omnipaque 350 intravenously.    The right flank collection has near completely resolved with just some linear stranding/inflammatory change where the stimulator was removed.  There is a small pericardial effusion.  There are few tiny liver lesions most likely cysts.  The spleen is unremarkable.  There is been a gastric bypass procedure.  The pancreas demonstrates nothing unusual.  The adrenal glands are not enlarged.  There is a left upper pole renal lesion most likely a cyst.  There is a small hiatal hernia.  No para-aortic, retroperitoneal, or mesenteric adenopathy is seen.  Bowel loops and pelvic organs show nothing unusual.  There is a trace amount of free fluid in the pelvis.  Bladder is unremarkable.  Right lower quadrant and appendix region demonstrate nothing unusual.  No abscess mass or inflammation seen.  There are leads from a posterior column stimulator.                                       Medications   acetaminophen tablet 1,000 mg (has no administration in time range)   iohexoL (OMNIPAQUE 350) injection 100 mL (90 mLs Intravenous Given 4/21/25 1137)     Medical Decision Making  This is an urgent evaluation of a 49-year-old afebrile female presenting with right lower quadrant/right groin pain.  Vital signs stable.  CBC and CMP unremarkable today. UA noninfectious. CT abdomen without acute abnormalities that would be causing the patient's pain.  Pelvic ultrasound notable for 2 small fibroids but otherwise no acute process.  Patient has remained stable throughout her ED visit without any development of fevers, nausea, vomiting.  We will recommend treatment with Tylenol and Robaxin and follow up with her PCP and Ob gyn.  Given strict return precautions.  Patient has verbalized understanding and agreement to this treatment plan.  All questions answered at this time and patient is stable for  discharge.    Differential Diagnosis includes, but is not limited to:  AAA, aortic dissection, mesenteric ischemia, perforated viscous, MI/ACS, SBO/volvulus, incarcerated/strangulated hernia, intussusception, ileus, appendicitis, cholecystitis, cholangitis, diverticulitis, esophagitis, hepatitis, nephrolithiasis, pancreatitis, gastroenteritis, colitis, IBD/IBS, biliary colic, GERD, PUD, constipation, UTI/pyelonephritis,  disorder.        Problems Addressed:  Right groin pain: acute illness or injury  RLQ abdominal pain: acute illness or injury    Amount and/or Complexity of Data Reviewed  Labs: ordered.  Radiology: ordered. Decision-making details documented in ED Course.    Risk  OTC drugs.  Prescription drug management.               ED Course as of 04/21/25 1357   Mon Apr 21, 2025   1221 CT Abdomen Pelvis With IV Contrast NO Oral Contrast  No acute process seen.     The right flank collection has completely resolved with a linear area of inflammation.     Small pericardial effusion.     Small liver cyst.     Left upper pole renal lesion most likely a cyst.     Small hiatal hernia.     Right lower quadrant is unremarkable.   [RM]   1238 Discussed results of CT scan with patient at bedside.  We will order pelvic ultrasound to rule out any ovarian abnormalities in addition to urinalysis.  [RM]   1347 US Pelvis Comp with Transvag NON-OB (xpd  Two small uterine leiomyomas.  No acute process seen. [RM]      ED Course User Index  [RM] Mariangel Hoyos PA-C                           Clinical Impression:  Final diagnoses:  [R10.31] Right groin pain (Primary)  [R10.31] RLQ abdominal pain          ED Disposition Condition    Discharge Stable          ED Prescriptions       Medication Sig Dispense Start Date End Date Auth. Provider    methocarbamoL (ROBAXIN) 500 MG Tab Take 1 tablet (500 mg total) by mouth 3 (three) times daily. for 5 days 15 tablet 4/21/2025 4/26/2025 Mariangel Hoyos PA-C          Follow-up  Information       Follow up With Specialties Details Why Contact Info    Hiwot Shah MD Family Medicine Schedule an appointment as soon as possible for a visit   [address]  [address]  [place] [address]  [phone]                 [1]   Social History  Tobacco Use    Smoking status: Never    Smokeless tobacco: Never   Substance Use Topics    Alcohol use: No    Drug use: No        Mariangel Hoyos PA-C  04/21/25 8942

## 2025-04-21 NOTE — ED NOTES
Informed of need of urine sample. Pt provided with labeled specimen collection cup. Ambulatory to restroom to provide urine sample.

## 2025-05-06 ENCOUNTER — PATIENT OUTREACH (OUTPATIENT)
Facility: OTHER | Age: 50
End: 2025-05-06
Payer: MEDICAID

## 2025-05-06 NOTE — PROGRESS NOTES
ED Navigator made follow up outreach to patient. Patient states she is doing well and denies needing any assistance at this time. Provided patient with education and resource information focusing on May's Health Awareness topics: Women's Health encouraging preventive care and regular checkups, Mental Health including crisis line contact info and relaxation techniques, Stroke awareness including F.A.S.T education, skin cancer awareness and protection against UV rays and seasonal allergy reduction tips. Also provided information on [facility] in [place] for summer activities for youth, adults and families. Provided [facility] 24/7 nurse line contact info and 211 contact info.

## 2025-05-13 ENCOUNTER — OFFICE VISIT (OUTPATIENT)
Dept: OBSTETRICS AND GYNECOLOGY | Facility: CLINIC | Age: 50
End: 2025-05-13
Attending: OBSTETRICS & GYNECOLOGY
Payer: MEDICAID

## 2025-05-13 VITALS — DIASTOLIC BLOOD PRESSURE: 65 MMHG | BODY MASS INDEX: 32.8 KG/M2 | SYSTOLIC BLOOD PRESSURE: 103 MMHG | WEIGHT: 209.44 LBS

## 2025-05-13 DIAGNOSIS — N71.9 ENDOMETRITIS: Primary | ICD-10-CM

## 2025-05-13 PROBLEM — D64.9 NORMOCYTIC ANEMIA: Status: RESOLVED | Noted: 2021-04-05 | Resolved: 2025-05-13

## 2025-05-13 PROCEDURE — 4010F ACE/ARB THERAPY RXD/TAKEN: CPT | Mod: CPTII,,, | Performed by: OBSTETRICS & GYNECOLOGY

## 2025-05-13 PROCEDURE — 3078F DIAST BP <80 MM HG: CPT | Mod: CPTII,,, | Performed by: OBSTETRICS & GYNECOLOGY

## 2025-05-13 PROCEDURE — 3008F BODY MASS INDEX DOCD: CPT | Mod: CPTII,,, | Performed by: OBSTETRICS & GYNECOLOGY

## 2025-05-13 PROCEDURE — 99999 PR PBB SHADOW E&M-EST. PATIENT-LVL II: CPT | Mod: PBBFAC,,, | Performed by: OBSTETRICS & GYNECOLOGY

## 2025-05-13 PROCEDURE — 99213 OFFICE O/P EST LOW 20 MIN: CPT | Mod: S$PBB,,, | Performed by: OBSTETRICS & GYNECOLOGY

## 2025-05-13 PROCEDURE — 99212 OFFICE O/P EST SF 10 MIN: CPT | Mod: PBBFAC | Performed by: OBSTETRICS & GYNECOLOGY

## 2025-05-13 PROCEDURE — 1159F MED LIST DOCD IN RCRD: CPT | Mod: CPTII,,, | Performed by: OBSTETRICS & GYNECOLOGY

## 2025-05-13 PROCEDURE — 3074F SYST BP LT 130 MM HG: CPT | Mod: CPTII,,, | Performed by: OBSTETRICS & GYNECOLOGY

## 2025-05-13 PROCEDURE — 1160F RVW MEDS BY RX/DR IN RCRD: CPT | Mod: CPTII,,, | Performed by: OBSTETRICS & GYNECOLOGY

## 2025-05-13 RX ORDER — METRONIDAZOLE 500 MG/1
500 TABLET ORAL EVERY 12 HOURS
Qty: 14 TABLET | Refills: 0 | Status: SHIPPED | OUTPATIENT
Start: 2025-05-13 | End: 2025-05-20

## 2025-05-13 RX ORDER — FAMOTIDINE 20 MG/1
20 TABLET, FILM COATED ORAL 2 TIMES DAILY
COMMUNITY
Start: 2025-05-09 | End: 2026-05-09

## 2025-05-13 RX ORDER — ONDANSETRON 8 MG/1
8 TABLET, ORALLY DISINTEGRATING ORAL EVERY 8 HOURS PRN
COMMUNITY
Start: 2025-05-09 | End: 2025-05-14

## 2025-05-13 RX ORDER — PROMETHAZINE HYDROCHLORIDE 25 MG/1
25 TABLET ORAL EVERY 6 HOURS PRN
COMMUNITY
Start: 2025-05-09 | End: 2025-05-16

## 2025-05-13 RX ORDER — DOXYCYCLINE 100 MG/1
100 CAPSULE ORAL EVERY 12 HOURS
Qty: 14 CAPSULE | Refills: 0 | Status: SHIPPED | OUTPATIENT
Start: 2025-05-13 | End: 2025-05-20

## 2025-05-14 NOTE — PROGRESS NOTES
SUBJECTIVE:   49 y.o. female  complains of abdominal pain since March.  She has been seen in the ED 3 times.  She is about 9 months out from her bariatrics surgery.  She states that her pain starts in her pelvis and then radiates to the rest of her abdomen.  Her pain is random and is not associated with eating, diarrhea, or constipation.  Her most recent CT shows no acute abnormalities and her ultrasound shows two small fibroids.  She denies vaginal discharge or urinary symptoms.    ROS:  GENERAL: No fever, chills, fatigability or weight loss.  VULVAR: No pain, no lesions and no itching.  VAGINAL: No relaxation, no itching, no discharge, no abnormal bleeding and no lesions.  ABDOMEN: No abdominal pain. Denies nausea. Denies vomiting. No diarrhea. No constipation  BREAST: Denies pain. No lumps. No discharge.  URINARY: No incontinence, no nocturia, no frequency and no dysuria.  CARDIOVASCULAR: No chest pain. No shortness of breath. No leg cramps.  NEUROLOGICAL: No headaches. No vision changes.        Vitals:    25 1522   BP: 103/65         OBJECTIVE:   She appears well, afebrile.  Abdomen: benign, soft, diffusely tender, no masses. No rebound or guarding.  VULVA: Normal external female genitalia, normal urethra, normal urethral meatus  VAGINA:no lesions  CERVIXNormal  UTERUSnormal size, mobile, tender  ADNEXAno mass, fullness, tenderness      ASSESSMENT:   Rama was seen today for pelvic pain.    Diagnoses and all orders for this visit:    Endometritis    Other orders  -     doxycycline (MONODOX) 100 MG capsule; Take 1 capsule (100 mg total) by mouth every 12 (twelve) hours. MAY REFILL ONCE for 7 days  -     metroNIDAZOLE (FLAGYL) 500 MG tablet; Take 1 tablet (500 mg total) by mouth every 12 (twelve) hours. for 7 days    Will treat with antibiotics for a week.  If no relief will recommend that she see gastro/bariatrics

## 2025-06-09 ENCOUNTER — PATIENT MESSAGE (OUTPATIENT)
Dept: OBSTETRICS AND GYNECOLOGY | Facility: CLINIC | Age: 50
End: 2025-06-09
Payer: MEDICAID

## 2025-06-09 DIAGNOSIS — R10.2 PELVIC PAIN IN FEMALE: Primary | ICD-10-CM

## 2025-06-11 ENCOUNTER — PATIENT MESSAGE (OUTPATIENT)
Dept: PRIMARY CARE CLINIC | Facility: CLINIC | Age: 50
End: 2025-06-11
Payer: MEDICAID

## 2025-06-17 ENCOUNTER — TELEPHONE (OUTPATIENT)
Dept: PRIMARY CARE CLINIC | Facility: CLINIC | Age: 50
End: 2025-06-17

## 2025-06-17 ENCOUNTER — PATIENT MESSAGE (OUTPATIENT)
Dept: PRIMARY CARE CLINIC | Facility: CLINIC | Age: 50
End: 2025-06-17

## 2025-06-17 ENCOUNTER — OFFICE VISIT (OUTPATIENT)
Dept: PRIMARY CARE CLINIC | Facility: CLINIC | Age: 50
End: 2025-06-17
Payer: MEDICAID

## 2025-06-17 VITALS
SYSTOLIC BLOOD PRESSURE: 112 MMHG | HEART RATE: 78 BPM | OXYGEN SATURATION: 98 % | HEIGHT: 67 IN | TEMPERATURE: 99 F | DIASTOLIC BLOOD PRESSURE: 66 MMHG | WEIGHT: 209.88 LBS | BODY MASS INDEX: 32.94 KG/M2

## 2025-06-17 DIAGNOSIS — Z98.84 HISTORY OF ROUX-EN-Y GASTRIC BYPASS: ICD-10-CM

## 2025-06-17 DIAGNOSIS — R10.13 EPIGASTRIC ABDOMINAL PAIN: Primary | ICD-10-CM

## 2025-06-17 DIAGNOSIS — Z00.00 ROUTINE GENERAL MEDICAL EXAMINATION AT A HEALTH CARE FACILITY: ICD-10-CM

## 2025-06-17 PROBLEM — E78.00 PURE HYPERCHOLESTEROLEMIA: Status: RESOLVED | Noted: 2024-05-13 | Resolved: 2025-06-17

## 2025-06-17 PROCEDURE — 99214 OFFICE O/P EST MOD 30 MIN: CPT | Mod: PBBFAC,PN | Performed by: FAMILY MEDICINE

## 2025-06-17 PROCEDURE — 99214 OFFICE O/P EST MOD 30 MIN: CPT | Mod: S$PBB,,, | Performed by: FAMILY MEDICINE

## 2025-06-17 PROCEDURE — 99999 PR PBB SHADOW E&M-EST. PATIENT-LVL IV: CPT | Mod: PBBFAC,,, | Performed by: FAMILY MEDICINE

## 2025-06-17 PROCEDURE — 3008F BODY MASS INDEX DOCD: CPT | Mod: CPTII,,, | Performed by: FAMILY MEDICINE

## 2025-06-17 PROCEDURE — 1160F RVW MEDS BY RX/DR IN RCRD: CPT | Mod: CPTII,,, | Performed by: FAMILY MEDICINE

## 2025-06-17 PROCEDURE — 1159F MED LIST DOCD IN RCRD: CPT | Mod: CPTII,,, | Performed by: FAMILY MEDICINE

## 2025-06-17 PROCEDURE — G2211 COMPLEX E/M VISIT ADD ON: HCPCS | Mod: ,,, | Performed by: FAMILY MEDICINE

## 2025-06-17 PROCEDURE — 3074F SYST BP LT 130 MM HG: CPT | Mod: CPTII,,, | Performed by: FAMILY MEDICINE

## 2025-06-17 PROCEDURE — 3078F DIAST BP <80 MM HG: CPT | Mod: CPTII,,, | Performed by: FAMILY MEDICINE

## 2025-06-17 PROCEDURE — 4010F ACE/ARB THERAPY RXD/TAKEN: CPT | Mod: CPTII,,, | Performed by: FAMILY MEDICINE

## 2025-06-17 NOTE — PROGRESS NOTES
Assessment:     1. Epigastric abdominal pain    2. History of Ariana-en-Y gastric bypass          Plan:     Epigastric abdominal pain  No current sx  Continue Protonix 40 daily  2022 rectal erosions & eryth gastrop  Refer GI    History of Ariana-en-Y gastric bypass  Stable, follow w Bariatrics    Follow up for ANNUAL w lipids, HGa1c prior in Sept 2025          HPI: Rama Moy is a 49 y.o. female with is here today for follow up ER    5/9 abd pain [facility] ER      CT ABD PELVIS WO CONTRAST 5/9/25  No acute abnormality on this noncontrast examination.    July 2, 2025 ariana en Y gastric bypass w  epigastric pain on & off . Has seen bariatric Dr Leo Klein [facility] 2/17 .     FL UPPER GI WITHOUT KUB 8/7/2024  Impression    1. Minimal spasm in the distal esophagus.  2. Status post gastric bypass surgery. No evidence for obstruction or leak.      Last EGD 2021    History of Present Illness    CHIEF COMPLAINT:  Patient presents today for abdominal pain with radiation to pelvis    HISTORY OF PRESENT ILLNESS:  She reports pain starting in the upper abdomen radiating down to the pelvis, causing her to bend over. She has had three recent emergency room visits in the past three weeks, with the most recent visits occurring this past Saturday and Sunday. She also reports recent fever and diarrhea associated with a cold. CTs during ER visits showed no evidence of leaks or other complications related to her gastric bypass surgery. Upper GI swallow study in 2024 was performed to evaluate for potential leaks and swallowing difficulties.    SURGICAL HISTORY:  Ariana-en-Y gastric bypass on July 2nd    MEDICATIONS:  Pantoprazole 40 mg daily      ROS:  ROS as indicated in HPI.             Current Outpatient Medications   Medication Instructions    albuterol (PROVENTIL/VENTOLIN HFA) 90 mcg/actuation inhaler INHALE 1 PUFFS BY MOUTH INTO THE LUNGS EVERY 6 HOURS AS NEEDED FOR WHEEZING. RESCUE    albuterol (VENTOLIN HFA) 90 mcg/actuation  "inhaler 2 puffs, Inhalation, Every 6 hours PRN, Rescue    aluminum-magnesium hydroxide-simethicone (MAALOX) 200-200-20 mg/5 mL Susp 30 mLs, Oral, Every 6 hours PRN    blood pressure monitor (IHEALTH EASE) XL arm size (OP) Other, As needed (PRN)    diclofenac sodium (VOLTAREN) 2 g, Topical (Top), 2 times daily    ezetimibe (ZETIA) 10 mg, Oral, Daily    LIDOcaine (LIDODERM) 5 % 1 patch, Transdermal, Daily, Remove & Discard patch within 12 hours or as directed by MD    pantoprazole (PROTONIX) 40 mg    potassium chloride SA (K-DUR,KLOR-CON) 20 MEQ tablet 20 mEq, Oral, Daily    rosuvastatin (CRESTOR) 40 mg, Oral, Nightly    sumatriptan (IMITREX) 25 mg, Oral, Once, Take 25 mg now. May  repeat in 2 hours x 1 dose    telmisartan (MICARDIS) 40 mg, Oral, Daily    traZODone (DESYREL) 50 mg, Oral, Nightly PRN       Lab Results   Component Value Date    HGBA1C 5.5 05/24/2022    HGBA1C 5.4 05/06/2015    HGBA1C 5.3 08/01/2012     No results found for: "MICALBCREAT"  Lab Results   Component Value Date    LDLCALC 116.4 11/20/2024    LDLCALC 266.6 (H) 10/09/2024    CHOL 183 11/20/2024    HDL 45 11/20/2024    TRIG 108 11/20/2024       Lab Results   Component Value Date     05/09/2025    K 4.0 05/09/2025     04/21/2025    CO2 27 05/09/2025    GLU 80 04/21/2025    BUN 17.9 05/09/2025    CREATININE 0.91 05/09/2025    CALCIUM 9.0 05/09/2025    PROT 7.1 04/21/2025    ALBUMIN 4.3 05/09/2025    BILITOT 0.3 05/09/2025    ALKPHOS 64 04/21/2025    AST 27 05/09/2025    ALT 19 05/09/2025    ANIONGAP 7 (L) 05/09/2025    ESTGFRAFRICA >60 06/27/2022    EGFRNONAA >60 06/27/2022    WBC 7.16 04/21/2025    HGB 12.0 04/21/2025    HGB 12.0 03/18/2025    HCT 38.4 04/21/2025    MCV 89 04/21/2025     04/21/2025    TSH 2.942 05/09/2022    HEPCAB Negative 03/18/2025       Lab Results   Component Value Date    AVFQZQCC65 984 (H) 08/20/2021    FERRITIN 57 05/09/2022    IRON 47 05/09/2022    TRANSFERRIN 215 05/09/2022    TIBC 318 05/09/2022    " "FESATURATED 15 (L) 05/09/2022         Past Medical History:   Diagnosis Date    Constipation     GI Dr Reece 2014    Gastroesophageal reflux disease without esophagitis 08/28/2018    She states she had EGD in past    History of Ariana-en-Y gastric bypass 08/09/2024    HTN (hypertension), benign 03/10/2015    Digital med 12/20    Intestinal erosis 09/17/2021    CS 2021    Iron deficiency anemia secondary to inadequate dietary iron intake 08/26/2021    Start OTC 8/21    Mixed hyperlipidemia 08/29/2013    Spinal cord stimulator status 08/28/2018    In back for L knee pain, Dr Kessler surgery & pain mgmt, Lyrica or Hydrocodone at night     Past Surgical History:   Procedure Laterality Date    COLONOSCOPY N/A 09/17/2021    Procedure: COLONOSCOPY;  Surgeon: Keith Mckeon MD;  Location: [facility] (4TH FLR);  Service: Endoscopy;  Laterality: N/A;  prep ins. emailed / [place] [facility]    DILATION AND CURETTAGE OF UTERUS      ESOPHAGOGASTRODUODENOSCOPY N/A 07/27/2021    Procedure: ESOPHAGOGASTRODUODENOSCOPY (EGD);  Surgeon: Keith Mckeon MD;  Location: [facility] (4TH FLR);  Service: Endoscopy;  Laterality: N/A;  COVID test 7/24 [facility]-    GASTRIC BYPASS N/A     HYSTEROSCOPY WITH DILATION AND CURETTAGE OF UTERUS N/A 09/21/2022    Procedure: HYSTEROSCOPY, WITH DILATION AND CURETTAGE OF UTERUS;  Surgeon: Kim Juarez MD;  Location: [facility];  Service: OB/GYN;  Laterality: N/A;    KNEE ARTHROSCOPY      L, Dasa    KNEE SURGERY      SKIN GRAFT      left finger due to injury    SPINAL CORD STIMULATOR IMPLANT      to L knee, Dr Kessler    TUBAL LIGATION       Vitals:    06/17/25 1231   BP: 112/66   Pulse: 78   Temp: 98.7 °F (37.1 °C)   TempSrc: Oral   SpO2: 98%   Weight: 95.2 kg (209 lb 14.1 oz)   Height: 5' 7" (1.702 m)   PainSc: 0-No pain     Wt Readings from Last 5 Encounters:   06/17/25 95.2 kg (209 lb 14.1 oz)   05/13/25 95 kg (209 lb 7 oz)   04/21/25 91.2 kg (201 lb)   03/18/25 94.8 kg (209 lb) "   01/12/25 98.9 kg (218 lb)     Objective:   Physical Exam  Constitutional:       Appearance: She is well-developed.   Eyes:      Pupils: Pupils are equal, round, and reactive to light.   Cardiovascular:      Rate and Rhythm: Normal rate and regular rhythm.      Heart sounds: Normal heart sounds. No murmur heard.  Pulmonary:      Effort: Pulmonary effort is normal.      Breath sounds: Normal breath sounds. No wheezing.   Abdominal:      General: Bowel sounds are normal. There is no distension.      Palpations: Abdomen is soft. There is no mass.      Tenderness: There is no guarding or rebound.      Comments: Mild epigastric tenderness   Musculoskeletal:      Cervical back: Neck supple.   Skin:     General: Skin is warm and dry.   Neurological:      Mental Status: She is alert.   Psychiatric:         Behavior: Behavior normal.

## 2025-06-17 NOTE — TELEPHONE ENCOUNTER
----- Message from Jennifer sent at 6/17/2025  1:04 PM CDT -----  The Gastro dept is not accepting any new pts with pt's insurance pt is wanting an external referral sent to [facility] ([facility]).    Thanks   Have we ever prescribed this med? Yes.  If yes, what date? 8/10/18    Last OV: 11/16/18    Next OV: 5/24/19    DX: Chronic obstructive pulmonary disease, unspecified COPD type (HCC) (J44.9)    Medications: levalbuterol (XOPENEX) 1.25 MG/3ML Nebu Soln

## 2025-06-17 NOTE — TELEPHONE ENCOUNTER
----- Message from Jennifer sent at 6/17/2025  1:03 PM CDT -----  Dr. Shah is wanting pt to have Annual done in 3 months please assist in scheduling we were able to schedule the lab appt.    Thanks

## 2025-06-22 ENCOUNTER — PATIENT MESSAGE (OUTPATIENT)
Dept: ADMINISTRATIVE | Facility: OTHER | Age: 50
End: 2025-06-22
Payer: MEDICAID

## 2025-06-23 ENCOUNTER — HOSPITAL ENCOUNTER (OUTPATIENT)
Dept: RADIOLOGY | Facility: OTHER | Age: 50
Discharge: HOME | End: 2025-06-23
Attending: OBSTETRICS & GYNECOLOGY
Payer: MEDICAID

## 2025-06-23 DIAGNOSIS — R10.2 PELVIC PAIN IN FEMALE: ICD-10-CM

## 2025-06-23 PROCEDURE — 76830 TRANSVAGINAL US NON-OB: CPT | Mod: 26,,, | Performed by: STUDENT IN AN ORGANIZED HEALTH CARE EDUCATION/TRAINING PROGRAM

## 2025-06-23 PROCEDURE — 76856 US EXAM PELVIC COMPLETE: CPT | Mod: TC

## 2025-06-23 PROCEDURE — 76856 US EXAM PELVIC COMPLETE: CPT | Mod: 26,,, | Performed by: STUDENT IN AN ORGANIZED HEALTH CARE EDUCATION/TRAINING PROGRAM

## 2025-06-25 ENCOUNTER — RESULTS FOLLOW-UP (OUTPATIENT)
Dept: OBSTETRICS AND GYNECOLOGY | Facility: HOSPITAL | Age: 50
End: 2025-06-25

## 2025-06-25 ENCOUNTER — PATIENT MESSAGE (OUTPATIENT)
Dept: OBSTETRICS AND GYNECOLOGY | Facility: CLINIC | Age: 50
End: 2025-06-25
Payer: MEDICAID

## 2025-06-26 ENCOUNTER — PATIENT OUTREACH (OUTPATIENT)
Facility: OTHER | Age: 50
End: 2025-06-26
Payer: MEDICAID

## 2025-06-26 ENCOUNTER — HOSPITAL ENCOUNTER (EMERGENCY)
Facility: OTHER | Age: 50
Discharge: HOME | End: 2025-06-27
Attending: EMERGENCY MEDICINE
Payer: MEDICAID

## 2025-06-26 VITALS
WEIGHT: 207 LBS | HEIGHT: 67 IN | DIASTOLIC BLOOD PRESSURE: 73 MMHG | TEMPERATURE: 98 F | OXYGEN SATURATION: 100 % | BODY MASS INDEX: 32.49 KG/M2 | HEART RATE: 69 BPM | RESPIRATION RATE: 19 BRPM | SYSTOLIC BLOOD PRESSURE: 139 MMHG

## 2025-06-26 DIAGNOSIS — N28.9 KIDNEY LESION: ICD-10-CM

## 2025-06-26 DIAGNOSIS — R10.84 GENERALIZED ABDOMINAL PAIN: Primary | ICD-10-CM

## 2025-06-26 LAB
ABSOLUTE EOSINOPHIL (OHS): 0.17 K/UL
ABSOLUTE MONOCYTE (OHS): 0.64 K/UL (ref 0.3–1)
ABSOLUTE NEUTROPHIL COUNT (OHS): 4.42 K/UL (ref 1.8–7.7)
ALBUMIN SERPL BCP-MCNC: 3.5 G/DL (ref 3.5–5.2)
ALP SERPL-CCNC: 69 UNIT/L (ref 40–150)
ALT SERPL W/O P-5'-P-CCNC: 26 UNIT/L (ref 10–44)
ANION GAP (OHS): 11 MMOL/L (ref 8–16)
AST SERPL-CCNC: 25 UNIT/L (ref 11–45)
BASOPHILS # BLD AUTO: 0.04 K/UL
BASOPHILS NFR BLD AUTO: 0.5 %
BILIRUB SERPL-MCNC: 0.4 MG/DL (ref 0.1–1)
BILIRUB UR QL STRIP.AUTO: NEGATIVE
BUN SERPL-MCNC: 19 MG/DL (ref 6–20)
CALCIUM SERPL-MCNC: 8.7 MG/DL (ref 8.7–10.5)
CHLORIDE SERPL-SCNC: 107 MMOL/L (ref 95–110)
CLARITY UR: CLEAR
CO2 SERPL-SCNC: 23 MMOL/L (ref 23–29)
COLOR UR AUTO: YELLOW
CREAT SERPL-MCNC: 0.8 MG/DL (ref 0.5–1.4)
CREAT SERPL-MCNC: 0.8 MG/DL (ref 0.5–1.4)
ERYTHROCYTE [DISTWIDTH] IN BLOOD BY AUTOMATED COUNT: 13.3 % (ref 11.5–14.5)
GFR SERPLBLD CREATININE-BSD FMLA CKD-EPI: >60 ML/MIN/1.73/M2
GLUCOSE SERPL-MCNC: 87 MG/DL (ref 70–110)
GLUCOSE UR QL STRIP: ABNORMAL
HCT VFR BLD AUTO: 36.4 % (ref 37–48.5)
HGB BLD-MCNC: 11.6 GM/DL (ref 12–16)
HGB UR QL STRIP: ABNORMAL
IMM GRANULOCYTES # BLD AUTO: 0.02 K/UL (ref 0–0.04)
IMM GRANULOCYTES NFR BLD AUTO: 0.2 % (ref 0–0.5)
KETONES UR QL STRIP: NEGATIVE
LACTATE SERPL-SCNC: 0.9 MMOL/L (ref 0.5–2.2)
LEUKOCYTE ESTERASE UR QL STRIP: NEGATIVE
LIPASE SERPL-CCNC: 58 U/L (ref 4–60)
LYMPHOCYTES # BLD AUTO: 3.47 K/UL (ref 1–4.8)
MCH RBC QN AUTO: 28.3 PG (ref 27–31)
MCHC RBC AUTO-ENTMCNC: 31.9 G/DL (ref 32–36)
MCV RBC AUTO: 89 FL (ref 82–98)
MICROSCOPIC COMMENT: NORMAL
NITRITE UR QL STRIP: NEGATIVE
NUCLEATED RBC (/100WBC) (OHS): 0 /100 WBC
PH UR STRIP: 6 [PH]
PLATELET # BLD AUTO: 271 K/UL (ref 150–450)
PMV BLD AUTO: 10 FL (ref 9.2–12.9)
POTASSIUM SERPL-SCNC: 4.1 MMOL/L (ref 3.5–5.1)
PROT SERPL-MCNC: 7 GM/DL (ref 6–8.4)
PROT UR QL STRIP: ABNORMAL
RBC # BLD AUTO: 4.1 M/UL (ref 4–5.4)
RBC #/AREA URNS AUTO: 4 /HPF (ref 0–4)
RELATIVE EOSINOPHIL (OHS): 1.9 %
RELATIVE LYMPHOCYTE (OHS): 39.6 % (ref 18–48)
RELATIVE MONOCYTE (OHS): 7.3 % (ref 4–15)
RELATIVE NEUTROPHIL (OHS): 50.5 % (ref 38–73)
SAMPLE: NORMAL
SODIUM SERPL-SCNC: 141 MMOL/L (ref 136–145)
SP GR UR STRIP: >=1.03
SQUAMOUS #/AREA URNS AUTO: 1 /HPF
UROBILINOGEN UR STRIP-ACNC: ABNORMAL EU/DL
WBC # BLD AUTO: 8.76 K/UL (ref 3.9–12.7)
WBC #/AREA URNS AUTO: 1 /HPF (ref 0–5)

## 2025-06-26 PROCEDURE — 84132 ASSAY OF SERUM POTASSIUM: CPT | Performed by: EMERGENCY MEDICINE

## 2025-06-26 PROCEDURE — 82565 ASSAY OF CREATININE: CPT

## 2025-06-26 PROCEDURE — 63600175 PHARM REV CODE 636 W HCPCS: Performed by: EMERGENCY MEDICINE

## 2025-06-26 PROCEDURE — 96375 TX/PRO/DX INJ NEW DRUG ADDON: CPT

## 2025-06-26 PROCEDURE — 99900035 HC TECH TIME PER 15 MIN (STAT)

## 2025-06-26 PROCEDURE — 25500020 PHARM REV CODE 255: Performed by: EMERGENCY MEDICINE

## 2025-06-26 PROCEDURE — 96374 THER/PROPH/DIAG INJ IV PUSH: CPT | Mod: 59

## 2025-06-26 PROCEDURE — 81003 URINALYSIS AUTO W/O SCOPE: CPT | Performed by: EMERGENCY MEDICINE

## 2025-06-26 PROCEDURE — 83605 ASSAY OF LACTIC ACID: CPT | Performed by: EMERGENCY MEDICINE

## 2025-06-26 PROCEDURE — 83690 ASSAY OF LIPASE: CPT | Performed by: EMERGENCY MEDICINE

## 2025-06-26 PROCEDURE — 99285 EMERGENCY DEPT VISIT HI MDM: CPT | Mod: 25

## 2025-06-26 PROCEDURE — 85025 COMPLETE CBC W/AUTO DIFF WBC: CPT | Performed by: EMERGENCY MEDICINE

## 2025-06-26 RX ORDER — MORPHINE SULFATE 4 MG/ML
4 INJECTION, SOLUTION INTRAMUSCULAR; INTRAVENOUS
Refills: 0 | Status: COMPLETED | OUTPATIENT
Start: 2025-06-26 | End: 2025-06-26

## 2025-06-26 RX ORDER — ONDANSETRON HYDROCHLORIDE 2 MG/ML
4 INJECTION, SOLUTION INTRAVENOUS
Status: COMPLETED | OUTPATIENT
Start: 2025-06-26 | End: 2025-06-26

## 2025-06-26 RX ADMIN — ONDANSETRON 4 MG: 2 INJECTION INTRAMUSCULAR; INTRAVENOUS at 10:06

## 2025-06-26 RX ADMIN — MORPHINE SULFATE 4 MG: 4 INJECTION INTRAVENOUS at 10:06

## 2025-06-26 RX ADMIN — IOHEXOL 100 ML: 350 INJECTION, SOLUTION INTRAVENOUS at 10:06

## 2025-06-26 NOTE — PROGRESS NOTES
ED Navigator made follow up outreach to patient. Provided patient with community activity resource information. Provided education to patient on the following health awareness topics for Janice: Alzheimer and Brain Awareness. Provided education and resource information and also provided tips on brain habit ideas that strengthen brain function such as puzzles/logic games, reading and learning new skills. Also provided education on heat exhaustion signs and symptoms, ways to prevent heat exhaustion, education on preventing dehydration and water safety education for rivers, lakes and oceans. Provided patient with 211, [employer] and [facility] 24/7 Nurse Line contact info.

## 2025-06-26 NOTE — Clinical Note
"aRma Carver"Jovon Moy was seen and treated in our emergency department on 6/26/2025.  She may return to work on 06/30/2025.       If you have any questions or concerns, please don't hesitate to call.      NICOLAS Carter RN    "

## 2025-06-27 LAB — HOLD SPECIMEN: NORMAL

## 2025-06-27 NOTE — ED PROVIDER NOTES
Encounter Date: 6/26/2025    SCRIBE #1 NOTE: I, Samarayaanna Estrada, am scribing for, and in the presence of,  Devon Perez MD. I have scribed the following portions of the note - Other sections scribed: HPI, ROS, PE.       History     Chief Complaint   Patient presents with    Abdominal Pain     Patient brought to ER by family for continue abdominal pain , patient stated third visit , abdominal pain 10 on scale , pain stabbing twisting, upper center radiates down to pelvis, associated nausea and vomiting patient denies diarrhea      49 y.o. female, with past medical history of HTN and HLD, presents to the ED with intermittent abdominal pain that began earlier today. She reports that this is the fourth ED visit for similar pain since April. She describes the pain as sharp and random, radiating from the abdomen down to the pelvic area. Associated symptoms include nausea, vomiting, and back pain. She denies blood in the vomit, diarrhea, fever, chills, dysuria, or hematuria. Patient has a history of gastric bypass surgery performed in July 2024. She states that she has followed up with both her surgeon and PCP, but no definitive cause for the pain has been identified. She is scheduled to see a GI next month. She denies taking any pain medication, stating that nothing has provided relief. She is unsure of any specific triggers or factors that cause the pain.    The history is provided by the patient. No  was used.     Review of patient's allergies indicates:   Allergen Reactions    Nsaids (non-steroidal anti-inflammatory drug) Other (See Comments)     Stomach issues     Past Medical History:   Diagnosis Date    Constipation     GI Dr Reece 2014    Gastroesophageal reflux disease without esophagitis 08/28/2018    She states she had EGD in past    History of Ariana-en-Y gastric bypass 08/09/2024    HTN (hypertension), benign 03/10/2015    Digital med 12/20    Intestinal erosis 09/17/2021    CS 2021     Iron deficiency anemia secondary to inadequate dietary iron intake 08/26/2021    Start OTC 8/21    Mixed hyperlipidemia 08/29/2013    Spinal cord stimulator status 08/28/2018    In back for L knee pain, Dr Kessler surgery & pain mgmt, Lyrica or Hydrocodone at night     Past Surgical History:   Procedure Laterality Date    COLONOSCOPY N/A 09/17/2021    Procedure: COLONOSCOPY;  Surgeon: Keith Mckeon MD;  Location: [facility] (4TH FLR);  Service: Endoscopy;  Laterality: N/A;  prep ins. emailed [place] [facility] [facility]    DILATION AND CURETTAGE OF UTERUS      ESOPHAGOGASTRODUODENOSCOPY N/A 07/27/2021    Procedure: ESOPHAGOGASTRODUODENOSCOPY (EGD);  Surgeon: Keith Mckeon MD;  Location: [facility] (4TH FLR);  Service: Endoscopy;  Laterality: N/A;  COVID test 7/24 [facility]-    GASTRIC BYPASS N/A     HYSTEROSCOPY WITH DILATION AND CURETTAGE OF UTERUS N/A 09/21/2022    Procedure: HYSTEROSCOPY, WITH DILATION AND CURETTAGE OF UTERUS;  Surgeon: Kim Juarez MD;  Location: [facility];  Service: OB/GYN;  Laterality: N/A;    KNEE ARTHROSCOPY      L, Dasa    KNEE SURGERY      SKIN GRAFT      left finger due to injury    SPINAL CORD STIMULATOR IMPLANT      to L knee, Dr Kessler    TUBAL LIGATION       Family History   Problem Relation Name Age of Onset    Hypertension Mother Margaret     Diabetes Mother Margaret     COPD Mother Margaret     Breast cancer Neg Hx      Colon cancer Neg Hx      Ovarian cancer Neg Hx       Social History[1]  Review of Systems   All other systems reviewed and are negative.      Physical Exam     Initial Vitals [06/26/25 1943]   BP Pulse Resp Temp SpO2   129/85 87 16 98.2 °F (36.8 °C) 98 %      MAP       --         Physical Exam    ED Course   Procedures  Labs Reviewed   URINALYSIS, REFLEX TO URINE CULTURE - Abnormal       Result Value    Color, UA Yellow      Appearance, UA Clear      pH, UA 6.0      Spec Grav UA >=1.030 (*)     Protein, UA Trace (*)     Glucose, UA Trace (*)     Ketones, UA  Negative      Bilirubin, UA Negative      Blood, UA 1+ (*)     Nitrites, UA Negative      Urobilinogen, UA 4.0-6.0 (*)     Leukocyte Esterase, UA Negative     CBC WITH DIFFERENTIAL - Abnormal    WBC 8.76      RBC 4.10      HGB 11.6 (*)     HCT 36.4 (*)     MCV 89      MCH 28.3      MCHC 31.9 (*)     RDW 13.3      Platelet Count 271      MPV 10.0      Nucleated RBC 0      Neut % 50.5      Lymph % 39.6      Mono % 7.3      Eos % 1.9      Basophil % 0.5      Imm Grans % 0.2      Neut # 4.42      Lymph # 3.47      Mono # 0.64      Eos # 0.17      Baso # 0.04      Imm Grans # 0.02     COMPREHENSIVE METABOLIC PANEL - Normal    Sodium 141      Potassium 4.1      Chloride 107      CO2 23      Glucose 87      BUN 19      Creatinine 0.8      Calcium 8.7      Protein Total 7.0      Albumin 3.5      Bilirubin Total 0.4      ALP 69      AST 25      ALT 26      Anion Gap 11      eGFR >60     LIPASE - Normal    Lipase Level 58     LACTIC ACID, PLASMA - Normal    Lactic Acid Level 0.9      Narrative:     Falsely low lactic acid results can be found in samples containing >=13.0 mg/dL total bilirubin and/or >=3.5 mg/dL direct bilirubin.    CBC W/ AUTO DIFFERENTIAL    Narrative:     The following orders were created for panel order CBC W/ AUTO DIFFERENTIAL.  Procedure                               Abnormality         Status                     ---------                               -----------         ------                     CBC with Differential[[id number]]       Abnormal            Final result                 Please view results for these tests on the individual orders.   URINALYSIS MICROSCOPIC    RBC, UA 4      WBC, UA 1      Squamous Epithelial Cells, UA 1      Microscopic Comment       GREY TOP URINE HOLD   ISTAT CREATININE    POC Creatinine 0.8      Sample VENOUS            Imaging Results              CT Abdomen Pelvis With IV Contrast NO Oral Contrast (Final result)  Result time 06/26/25 23:02:37      Final result by  Sander Mejia MD (06/26/25 23:02:37)                   Impression:    IMPRESSION:    1.  No acute abnormality on contrast abdomen and pelvis CT. The appendix is normal.    2.  Postop gastric bypass surgery. Moderate aortic iliac atherosclerotic calcification.    3.  10 mm low-density lesion left mid pole kidney with minimal central calcification not well characterized on this study. This is most likely a complex cyst, however consider follow-up CT with and without contrast in 6-12 months.    -Electronically Signed By: Sander Mejia MD   -Electronically Signed On:  6/26/2025 11:02 PM      Report Ends               Narrative:    EXAM: Abdomen and Pelvis CT with contrast    HISTORY: Acute abdomen pain.    TECHNIQUE: Multiple axial images are obtained through the abdomen and pelvis following intravenous and without oral contrast. Coronal and sagittal reformatted images are obtained.    All CT scans are performed using dose optimization technique as appropriate to a performed exam including automated exposure control and/or standardized protocols for targeted exams where dose is matched to indication/reason for exam/patient size.    COMPARISON: None.    FINDINGS:    ABDOMEN CT:    Limited assessment of the lung bases is unremarkable.     The liver, spleen, adrenal glands, and pancreas are unremarkable. The gallbladder is unremarkable. There has been gastric bypass surgery. The bowel within the abdomen shows no acute abnormality. There is no obstruction. Moderate aortic iliac atherosclerotic calcification. There is a low-density lesion left mid pole kidney with minimal central calcification not well characterized on this study measuring 10 mm. This is most likely a complex cyst, however consider follow-up in 6-12 months. There is no fluid collection or free air. The appendix is normal.    PELVIS CT:    The visualized bowel the pelvis is unremarkable.  The distal ureters and bladder are unremarkable. There is no pelvic  fluid collection.     The bones of the abdomen and pelvis show no acute abnormality. There is a line extending into the lumbar spinal canal where it is looped at the level of L3 on the left.                                       Medications   ondansetron injection 4 mg (4 mg Intravenous Given 6/26/25 2225)   morphine injection 4 mg (4 mg Intravenous Given 6/26/25 2226)   iohexoL (OMNIPAQUE 350) injection 100 mL (100 mLs Intravenous Given 6/26/25 2231)     Medical Decision Making  Patient's symptoms not typical for other emergent causes of abdominal pain such as, but not limited to, appendicitis, abdominal aortic aneurysm, surgical biliary disease, pancreatitis, SBO, mesenteric ischemia, serious intra-abdominal bacterial illness. Presentation also not typical of gynecologic emergencies such as TOA, Ovarian Torsion, PID.  Not Ectopic.  Doubt atypical ACS.  All labs and imaging results discussed with the patient and her son at bedside in detail.  Pt tolerating PO and pain controlled.  Patient has GI follow-up and has been working out E symptoms with her PCP as well as gastric surgeon.  Patient will be discharged with strict return precautions and follow up closely with PCP/GI for further evaluation. Pt understands and agrees with discharge instructions. Pt also given strict return precautions for any new or worsening symptoms and plans to follow up closely with PCP.       Amount and/or Complexity of Data Reviewed  Labs: ordered. Decision-making details documented in ED Course.  Radiology: ordered and independent interpretation performed. Decision-making details documented in ED Course.    Risk  Prescription drug management.            Scribe Attestation:   Scribe #1: I performed the above scribed service and the documentation accurately describes the services I performed. I attest to the accuracy of the note.        ED Course as of 06/27/25 1454   Thu Jun 26, 2025   [id number] Impression:  IMPRESSION:     1.  No acute  abnormality on contrast abdomen and pelvis CT. The appendix is normal.     2.  Postop gastric bypass surgery. Moderate aortic iliac atherosclerotic calcification.     3.  10 mm low-density lesion left mid pole kidney with minimal central calcification not well characterized on this study. This is most likely a complex cyst, however consider follow-up CT with and without contrast in 6-12 months.     -Electronically Signed By: Sander Mejia MD   -Electronically Signed On:  6/26/2025 11:02 PM   [BD]      ED Course User Index  [BD] Devon Perez MD          Physician Attestation for Scribe: I, Devon Perez, reviewed documentation as scribed in my presence, which is both accurate and complete.                   Clinical Impression:  Final diagnoses:  [R10.84] Generalized abdominal pain (Primary)  [N28.9] Kidney lesion          ED Disposition Condition    Discharge Stable          ED Prescriptions    None       Follow-up Information       Follow up With Specialties Details Why Contact Info    Hiwot Shah MD Family Medicine Go in 3 days  [address]  [address]  [facility] [address]  [phone]      [facility] Emergency Dept Emergency Medicine Go to  As needed, If symptoms worsen [address] [facility] [address]  [phone]          Launch MDCalc MDM  MDCalc MDM Module  Jun 27 2025 2:54 PM [Devon Perez]  Data:  - Test/documents/historian: 3+ tests ordered  Problems: Concern for High-risk intra-abdominal pathology: bowel perforation, bowel obstruction  Additional encounter diagnoses: Kidney lesion  Risk: morphine injection (Parenteral controlled substances)             [1]   Social History  Tobacco Use    Smoking status: Never    Smokeless tobacco: Never   Substance Use Topics    Alcohol use: No    Drug use: No        Devon Perez MD  06/27/25 [id number]

## 2025-06-27 NOTE — DISCHARGE INSTRUCTIONS
10 mm low-density lesion left mid pole kidney with minimal central calcification not well characterized on this study. This is most likely a complex cyst, however consider follow-up CT with and without contrast in 6-12 months.

## 2025-07-06 ENCOUNTER — HOSPITAL ENCOUNTER (EMERGENCY)
Facility: OTHER | Age: 50
Discharge: HOME | End: 2025-07-06
Attending: EMERGENCY MEDICINE
Payer: MEDICAID

## 2025-07-06 ENCOUNTER — PATIENT MESSAGE (OUTPATIENT)
Dept: ADMINISTRATIVE | Facility: OTHER | Age: 50
End: 2025-07-06
Payer: MEDICAID

## 2025-07-06 VITALS
TEMPERATURE: 98 F | WEIGHT: 207 LBS | HEART RATE: 84 BPM | OXYGEN SATURATION: 99 % | BODY MASS INDEX: 32.49 KG/M2 | DIASTOLIC BLOOD PRESSURE: 73 MMHG | RESPIRATION RATE: 18 BRPM | SYSTOLIC BLOOD PRESSURE: 123 MMHG | HEIGHT: 67 IN

## 2025-07-06 DIAGNOSIS — M54.50 CHRONIC BILATERAL LOW BACK PAIN WITHOUT SCIATICA: Primary | ICD-10-CM

## 2025-07-06 DIAGNOSIS — R10.84 GENERALIZED ABDOMINAL PAIN: ICD-10-CM

## 2025-07-06 DIAGNOSIS — G89.29 CHRONIC BILATERAL LOW BACK PAIN WITHOUT SCIATICA: Primary | ICD-10-CM

## 2025-07-06 PROCEDURE — 99284 EMERGENCY DEPT VISIT MOD MDM: CPT | Mod: 25

## 2025-07-06 PROCEDURE — 63600175 PHARM REV CODE 636 W HCPCS: Mod: JZ,TB | Performed by: EMERGENCY MEDICINE

## 2025-07-06 PROCEDURE — 25000003 PHARM REV CODE 250: Performed by: EMERGENCY MEDICINE

## 2025-07-06 PROCEDURE — 96372 THER/PROPH/DIAG INJ SC/IM: CPT | Performed by: EMERGENCY MEDICINE

## 2025-07-06 RX ORDER — KETOROLAC TROMETHAMINE 30 MG/ML
30 INJECTION, SOLUTION INTRAMUSCULAR; INTRAVENOUS
Status: COMPLETED | OUTPATIENT
Start: 2025-07-06 | End: 2025-07-06

## 2025-07-06 RX ORDER — TIZANIDINE 4 MG/1
4 TABLET ORAL EVERY 6 HOURS PRN
Qty: 20 TABLET | Refills: 0 | Status: SHIPPED | OUTPATIENT
Start: 2025-07-06 | End: 2025-07-16

## 2025-07-06 RX ORDER — DIAZEPAM 5 MG/1
5 TABLET ORAL
Status: COMPLETED | OUTPATIENT
Start: 2025-07-06 | End: 2025-07-06

## 2025-07-06 RX ORDER — LIDOCAINE 50 MG/G
1 PATCH TOPICAL DAILY
Qty: 10 PATCH | Refills: 0 | Status: SHIPPED | OUTPATIENT
Start: 2025-07-06 | End: 2025-07-16

## 2025-07-06 RX ADMIN — DIAZEPAM 5 MG: 5 TABLET ORAL at 01:07

## 2025-07-06 RX ADMIN — KETOROLAC TROMETHAMINE 30 MG: 30 INJECTION, SOLUTION INTRAMUSCULAR; INTRAVENOUS at 01:07

## 2025-07-06 NOTE — ED TRIAGE NOTES
Pt presents to ED with c/o back pain that radiates to abdomen. Pt states she has come to the ED four times within this month and no progress has been made with resolving this pain. Pt states pain is constant, no relief factors. Pt states abdominal pain started a year ago this week after having bariatric surgery. When asked if pt was told of any complications associated with surgery pt denies. Pt endorses headache x2 days. Pt denies n/v, difficulty eliminating urine or stool. Pt AAOx4, NAD noted.

## 2025-07-06 NOTE — DISCHARGE INSTRUCTIONS
Continue following up with your primary care physician for re-evaluation and recommendations for physical therapy.  Follow up with GI for further evaluation of your abdominal pain.    I hope you are able to find the issue that is causing your abdominal pain soon.

## 2025-07-06 NOTE — ED PROVIDER NOTES
Source of History:  The patient    Chief complaint:  Abdominal Pain (Pt been here multiple times for same complaint of abdominal pain and back pain, has GI appointment at the end of July but needs pain management until then. History of gastric bypass in 2024 )      HPI:  Rama Moy is a 49 y.o. female  who complains of low back pain as well as abdominal pain that is been going on for the last couple of days.  She has had chronic abdominal pain has been to the emergency department multiple times without any answers.  She has been following up with the primary care and has an appointment with Gastroenterology but not till later this month.  She denies any new symptoms.  States the back pain is more debilitating in his in the low part of the back radiating up on both sides.  Worse with movement.  Describes an achy crampy.  No fevers, chills nausea vomiting.  No dysuria.    This is the extent to the patients complaints today here in the emergency department.    ROS:   See HPI.    Review of patient's allergies indicates:   Allergen Reactions    Nsaids (non-steroidal anti-inflammatory drug) Other (See Comments)     Stomach issues       PMH:  As per HPI and below:  Past Medical History:   Diagnosis Date    Constipation     GI Dr Reece 2014    Gastroesophageal reflux disease without esophagitis 08/28/2018    She states she had EGD in past    History of Ariana-en-Y gastric bypass 08/09/2024    HTN (hypertension), benign 03/10/2015    Digital med 12/20    Intestinal erosis 09/17/2021    CS 2021    Iron deficiency anemia secondary to inadequate dietary iron intake 08/26/2021    Start OTC 8/21    Mixed hyperlipidemia 08/29/2013    Spinal cord stimulator status 08/28/2018    In back for L knee pain, Dr Kessler surgery & pain mgmt, Lyrica or Hydrocodone at night     Past Surgical History:   Procedure Laterality Date    COLONOSCOPY N/A 09/17/2021    Procedure: COLONOSCOPY;  Surgeon: Keith Mckeon MD;  Location: [facility]  "ENDO (4TH FLR);  Service: Endoscopy;  Laterality: N/A;  prep ins. emailed / COVID screening [facility] 9/14/21- ERW    DILATION AND CURETTAGE OF UTERUS      ESOPHAGOGASTRODUODENOSCOPY N/A 07/27/2021    Procedure: ESOPHAGOGASTRODUODENOSCOPY (EGD);  Surgeon: Keith Mckeon MD;  Location: [facility] (4TH FLR);  Service: Endoscopy;  Laterality: N/A;  COVID test 7/24 [facility]-    GASTRIC BYPASS N/A     HYSTEROSCOPY WITH DILATION AND CURETTAGE OF UTERUS N/A 09/21/2022    Procedure: HYSTEROSCOPY, WITH DILATION AND CURETTAGE OF UTERUS;  Surgeon: Kim Juarez MD;  Location: [facility];  Service: OB/GYN;  Laterality: N/A;    KNEE ARTHROSCOPY      L, Dasa    KNEE SURGERY      SKIN GRAFT      left finger due to injury    SPINAL CORD STIMULATOR IMPLANT      to L knee, Dr Kessler    TUBAL LIGATION         Social History[1]    Physical Exam:    /76 (BP Location: Left arm)   Pulse 88   Temp 98 °F (36.7 °C) (Oral)   Resp 18   Ht 5' 7" (1.702 m)   Wt 93.9 kg (207 lb)   LMP 04/27/2021   SpO2 99%   Breastfeeding No   BMI 32.42 kg/m²   Nursing note and vital signs reviewed.  Constitutional: No acute distress.  Nontoxic  Eyes:  Conjunctiva normal.  Extraocular muscles are intact.  Cardiovascular: Regular rate and rhythm.  No murmurs. No gallops. No rubs  Respiratory: Clear to auscultation bilaterally.  Good air movement.  No wheezes.  No rhonchi. No rales. No accessory muscle use..  Abdomen: Soft.  Not distended.  Nontender.  No guarding.  No rebound. Non-peritoneal.  Neuro: alert. At baseline.  No focal neurological deficits.  Musculoskeletal:  Tenderness to palpation of the lumbosacral joint bilaterally as well as the paraspinal lumbar spine.  No midline tenderness step-offs or deformities of the lumbar spine.      Summary of Previous Medical Records:  06/26/2025 seen here in the emergency department for generalized abdominal pain.  Labs were unremarkable.  CT scan showed no acute abnormality.    Seen here in March as " well as April 2025 for abdominal pain.  Blood work was done at these visits as well as a CT scan April 2025 with no acute abnormality.      Differential Dx/ MDM/ Workup:  49-year-old female with low back pain as well as chronic abdominal pain.  Regarding the abdominal pain she has been here multiple times and had unremarkable workups.  I see no indication for repeat labs or imaging regarding this.  She has a follow up appointment with GI as an outpatient.  I have also discussed with the patient about the need to follow up with primary care.  Regarding the back pain likely musculoskeletal.  I have considered but do not see any evidence of renal colic or pyelonephritis.  Denies any urinary symptoms.  Will give analgesia as well as muscle relaxant and plan for discharge. On exam patient does not have an acute abdomen.                          Diagnostic Impression:    1. Chronic bilateral low back pain without sciatica    2. Generalized abdominal pain         ED Disposition Condition    Discharge Stable            ED Prescriptions       Medication Sig Dispense Start Date End Date Auth. Provider    tiZANidine (ZANAFLEX) 4 MG tablet Take 1 tablet (4 mg total) by mouth every 6 (six) hours as needed. 20 tablet 7/6/2025 7/16/2025 Sathish Mcclelland, DO    LIDOcaine (LIDODERM) 5 % Place 1 patch onto the skin once daily. Remove & Discard patch within 12 hours or as directed by MD for 10 days 10 patch 7/6/2025 7/16/2025 Sathish Mcclelland, DO          Follow-up Information       Follow up With Specialties Details Why Contact Info    Hiwot Shah MD Family Medicine Schedule an appointment as soon as possible for a visit in 1 week  [address]  [address]  [facility] [address]  [phone]                   [1]   Social History  Tobacco Use    Smoking status: Never    Smokeless tobacco: Never   Substance Use Topics    Alcohol use: No    Drug use: No        Sathish Mcclelland DO  07/06/25 4049

## 2025-07-21 ENCOUNTER — TELEPHONE (OUTPATIENT)
Dept: GASTROENTEROLOGY | Facility: CLINIC | Age: 50
End: 2025-07-21
Payer: MEDICAID

## 2025-07-21 ENCOUNTER — PATIENT MESSAGE (OUTPATIENT)
Dept: PRIMARY CARE CLINIC | Facility: CLINIC | Age: 50
End: 2025-07-21
Payer: MEDICAID

## 2025-07-21 NOTE — TELEPHONE ENCOUNTER
Copied from CRM #[id number]. Topic: General Inquiry - Patient Advice  >> Jul 21, 2025  7:01 AM Deuce wrote:  Type:  Needs Medical Advice    Who Called: Patient  Symptoms (please be specific): na   How long has patient had these symptoms:  na  Pharmacy name and phone #:  na  Would the patient rather a call back or a response via [facility]? Call back  Best Call Back Number: [phone]  Additional Information: Pt is requesting a call back asap regarding rescheduling her 3pm appt today due to a plane delay

## 2025-07-21 NOTE — TELEPHONE ENCOUNTER
Copied from CRM #[id number]. Topic: Appointments - Appointment Rescheduling  >> Jul 21, 2025  9:10 AM Lashawn wrote:  Type:  Sooner Appointment change  Request    Caller is requesting a sooner appointment.    Name of Caller:Ms Jovon Moy   When is the first available appointment?none   Would the patient rather a call back or a response via [facility]? Call   Best Call Back Number: [phone]  Additional Information: She want to see if she can get her appt changed to a virtual appt please call or reschedule for another day states that she is out of town

## 2025-07-21 NOTE — TELEPHONE ENCOUNTER
MA called/spoke with patient in regards to scheduling an appointment for her abdominal pain. Next available offered with Shira Swenson PA-C. Patient accepted/appointment scheduled for 7/22.

## 2025-07-22 ENCOUNTER — TELEPHONE (OUTPATIENT)
Dept: ENDOSCOPY | Facility: HOSPITAL | Age: 50
End: 2025-07-22
Payer: MEDICAID

## 2025-07-22 ENCOUNTER — OFFICE VISIT (OUTPATIENT)
Dept: GASTROENTEROLOGY | Facility: CLINIC | Age: 50
End: 2025-07-22
Payer: MEDICAID

## 2025-07-22 VITALS
BODY MASS INDEX: 33.15 KG/M2 | HEIGHT: 67 IN | SYSTOLIC BLOOD PRESSURE: 114 MMHG | HEART RATE: 77 BPM | DIASTOLIC BLOOD PRESSURE: 78 MMHG | WEIGHT: 211.19 LBS

## 2025-07-22 DIAGNOSIS — R10.13 EPIGASTRIC PAIN: ICD-10-CM

## 2025-07-22 DIAGNOSIS — R10.9 ABDOMINAL PAIN, UNSPECIFIED ABDOMINAL LOCATION: Primary | ICD-10-CM

## 2025-07-22 DIAGNOSIS — Z12.11 COLON CANCER SCREENING: ICD-10-CM

## 2025-07-22 DIAGNOSIS — R10.30 LOWER ABDOMINAL PAIN: Primary | ICD-10-CM

## 2025-07-22 PROCEDURE — 1159F MED LIST DOCD IN RCRD: CPT | Mod: CPTII,,,

## 2025-07-22 PROCEDURE — 4010F ACE/ARB THERAPY RXD/TAKEN: CPT | Mod: CPTII,,,

## 2025-07-22 PROCEDURE — 99999 PR PBB SHADOW E&M-EST. PATIENT-LVL IV: CPT | Mod: PBBFAC,,,

## 2025-07-22 PROCEDURE — 3078F DIAST BP <80 MM HG: CPT | Mod: CPTII,,,

## 2025-07-22 PROCEDURE — 99204 OFFICE O/P NEW MOD 45 MIN: CPT | Mod: S$PBB,,,

## 2025-07-22 PROCEDURE — 3008F BODY MASS INDEX DOCD: CPT | Mod: CPTII,,,

## 2025-07-22 PROCEDURE — 3074F SYST BP LT 130 MM HG: CPT | Mod: CPTII,,,

## 2025-07-22 PROCEDURE — 99214 OFFICE O/P EST MOD 30 MIN: CPT | Mod: PBBFAC

## 2025-07-22 RX ORDER — SODIUM, POTASSIUM,MAG SULFATES 17.5-3.13G
1 SOLUTION, RECONSTITUTED, ORAL ORAL DAILY
Qty: 1 KIT | Refills: 0 | Status: SHIPPED | OUTPATIENT
Start: 2025-07-22 | End: 2025-07-24

## 2025-07-22 NOTE — PROGRESS NOTES
"    [facility] Consultation Note    Reason for Consult:  The primary encounter diagnosis was Lower abdominal pain. A diagnosis of Epigastric pain was also pertinent to this visit.    PCP:   Hiwot Shah   [address] [facility] / [place] [id number]    Referring MD:  Hiwot Shah Md  [address] [place] [address]    HPI:  This is a 49 y.o. female here for evaluation of random episodes of shooting pain that starts in epigastric region and radiates down to lower abdomen. Afterwards, she has abdominal soreness for 2 days. States this has been occurring intermittently since her gastric bypass last summer. No known triggers, stating episodes are random. She takes PPI as needed. Reports she has regular, formed BM daily. She did have diarrhea and fever during her last episode, now resolved. She's been evaluated in ED multiple times for this pain. CT scan last month with moderate aortic iliac atherosclerotic calcification. She does have uterine fibroids, but Gyn didn't think this was causing her symptoms. Pt denies dysphagia, reflux, regurgitation, N/V, bloody stools, rectal bleeding, melena, or unintentional weight loss. Denies frequent NSAID use. Denies tobacco and alcohol use. Denies known FHx of GI malignancies.     Objective Findings:    Vital Signs:  /78   Pulse 77   Ht 5' 7" (1.702 m)   Wt 95.8 kg (211 lb 3.2 oz)   LMP 04/27/2021   BMI 33.08 kg/m²   Body mass index is 33.08 kg/m².    Physical Exam:  General Appearance: Well appearing in no acute distress  Abdomen: Soft, non tender, non distended in all four quadrants. No hepatosplenomegaly, ascites, or mass.    I have personally reviewed labs and imaging results.     CT Abdomen Pelvis (06/26/2025):  IMPRESSION:  1.  No acute abnormality on contrast abdomen and pelvis CT. The appendix is normal.  2.  Postop gastric bypass surgery. Moderate aortic iliac atherosclerotic calcification.  3.  10 mm low-density " lesion left mid pole kidney with minimal central calcification not well characterized on this study. This is most likely a complex cyst, however consider follow-up CT with and without contrast in 6-12 months.       Assessment:  1. Lower abdominal pain    2. Epigastric pain       This is a 49 y.o F s/p bypass last July who has been having episodes of epigastric shooting pains that radiate down to her lower abdomen/pelvis. Gyn treated her for endometritis with doxy/flagyl but this offered no relief. She does have uterine fibroids. Last cscope was 2021 with few erosions in the colon, otherwise nml. She had EGD in 2021 with erythematous antral gastropathy.     - Ordered dual scopes  - Ordered calprotectin    RTC as needed after scopes    Order summary:  Orders Placed This Encounter    Stool culture    Calprotectin, Stool     Thank you so much for allowing me to participate in the care of Leana Williams Dent Sarah Abukhader, PA-C [facility]

## 2025-07-23 ENCOUNTER — LAB VISIT (OUTPATIENT)
Dept: LAB | Facility: HOSPITAL | Age: 50
End: 2025-07-23
Payer: MEDICAID

## 2025-07-23 DIAGNOSIS — R10.30 LOWER ABDOMINAL PAIN: ICD-10-CM

## 2025-07-23 PROCEDURE — 87427 SHIGA-LIKE TOXIN AG IA: CPT

## 2025-07-23 PROCEDURE — 83993 ASSAY FOR CALPROTECTIN FECAL: CPT

## 2025-07-23 PROCEDURE — 87045 FECES CULTURE AEROBIC BACT: CPT

## 2025-07-23 PROCEDURE — 87046 STOOL CULTR AEROBIC BACT EA: CPT | Mod: 59

## 2025-07-24 LAB
C COLI+JEJ+UPSA DNA STL QL NAA+NON-PROBE: NEGATIVE
CALPROTECTIN INTERP (OHS): ABNORMAL
CALPROTECTIN STOOL (OHS): 299 ΜG/G

## 2025-07-25 LAB
E COLI SXT1 STL QL IA: NEGATIVE
E COLI SXT2 STL QL IA: NEGATIVE

## 2025-07-26 LAB — BACTERIA STL CULT: NORMAL

## 2025-08-04 ENCOUNTER — PATIENT MESSAGE (OUTPATIENT)
Dept: ENDOSCOPY | Facility: HOSPITAL | Age: 50
End: 2025-08-04
Payer: MEDICAID

## 2025-09-05 ENCOUNTER — ANESTHESIA EVENT (OUTPATIENT)
Dept: ENDOSCOPY | Facility: HOSPITAL | Age: 50
End: 2025-09-05
Payer: MEDICAID

## 2025-09-05 ENCOUNTER — ANESTHESIA (OUTPATIENT)
Dept: ENDOSCOPY | Facility: HOSPITAL | Age: 50
End: 2025-09-05
Payer: MEDICAID

## 2025-09-05 PROCEDURE — 63600175 PHARM REV CODE 636 W HCPCS: Performed by: NURSE ANESTHETIST, CERTIFIED REGISTERED

## 2025-09-05 PROCEDURE — 25000003 PHARM REV CODE 250: Performed by: NURSE ANESTHETIST, CERTIFIED REGISTERED

## 2025-09-05 RX ORDER — LIDOCAINE HYDROCHLORIDE 20 MG/ML
INJECTION, SOLUTION EPIDURAL; INFILTRATION; INTRACAUDAL; PERINEURAL
Status: DISCONTINUED | OUTPATIENT
Start: 2025-09-05 | End: 2025-09-05

## 2025-09-05 RX ORDER — PROPOFOL 10 MG/ML
VIAL (ML) INTRAVENOUS
Status: DISCONTINUED | OUTPATIENT
Start: 2025-09-05 | End: 2025-09-05

## 2025-09-05 RX ADMIN — PROPOFOL 20 MG: 10 INJECTION, EMULSION INTRAVENOUS at 09:09

## 2025-09-05 RX ADMIN — PROPOFOL 60 MG: 10 INJECTION, EMULSION INTRAVENOUS at 09:09

## 2025-09-05 RX ADMIN — LIDOCAINE HYDROCHLORIDE 60 MG: 20 INJECTION, SOLUTION EPIDURAL; INFILTRATION; INTRACAUDAL; PERINEURAL at 09:09

## 2025-09-05 RX ADMIN — PROPOFOL 150 MCG/KG/MIN: 10 INJECTION, EMULSION INTRAVENOUS at 09:09

## 2025-09-05 RX ADMIN — SODIUM CHLORIDE: 9 INJECTION, SOLUTION INTRAVENOUS at 09:09

## (undated) DEVICE — GLOVE BIOGEL SKINSENSE PI 6.5

## (undated) DEVICE — PAD PERI POST REPLACEMNT

## (undated) DEVICE — Device

## (undated) DEVICE — SOL PVP-I SCRUB 7.5% 4OZ

## (undated) DEVICE — SET BASIN 48X48IN 6000ML RING

## (undated) DEVICE — SEE L#120831

## (undated) DEVICE — SOL IRR NACL .9% 3000ML

## (undated) DEVICE — PACK FLUENT DISPOSABLE

## (undated) DEVICE — SOL BETADINE 5%